# Patient Record
Sex: MALE | Race: WHITE | NOT HISPANIC OR LATINO | URBAN - METROPOLITAN AREA
[De-identification: names, ages, dates, MRNs, and addresses within clinical notes are randomized per-mention and may not be internally consistent; named-entity substitution may affect disease eponyms.]

---

## 2017-01-11 DIAGNOSIS — F41.9 ANXIETY: Primary | ICD-10-CM

## 2017-01-11 RX ORDER — CLONAZEPAM 1 MG/1
1 TABLET ORAL DAILY
Qty: 30 TABLET | Refills: 1
Start: 2017-01-11 | End: 2017-03-08

## 2017-02-17 ENCOUNTER — DOCUMENTATION ONLY (OUTPATIENT)
Dept: VASCULAR SURGERY | Facility: CLINIC | Age: 72
End: 2017-02-17

## 2017-03-08 DIAGNOSIS — F41.9 ANXIETY: ICD-10-CM

## 2017-03-08 DIAGNOSIS — I10 ESSENTIAL HYPERTENSION WITH GOAL BLOOD PRESSURE LESS THAN 140/90: ICD-10-CM

## 2017-03-08 RX ORDER — LISINOPRIL 40 MG/1
40 TABLET ORAL DAILY
Qty: 30 TABLET | Refills: 0 | Status: SHIPPED | OUTPATIENT
Start: 2017-03-08 | End: 2017-04-17

## 2017-03-08 RX ORDER — CLONAZEPAM 1 MG/1
1 TABLET ORAL DAILY
Qty: 30 TABLET | Refills: 0
Start: 2017-03-08 | End: 2017-04-17

## 2017-03-08 NOTE — TELEPHONE ENCOUNTER
Medication is being filled for 1 time refill only due to:  Patient needs to be seen because it has been more than one year since last visit.  Has future appt for 4/10/17

## 2017-03-15 DIAGNOSIS — G47.00 INSOMNIA, UNSPECIFIED TYPE: ICD-10-CM

## 2017-03-15 RX ORDER — ZALEPLON 5 MG/1
15 CAPSULE ORAL AT BEDTIME
Qty: 120 CAPSULE | Refills: 0
Start: 2017-03-15 | End: 2017-05-12

## 2017-03-15 NOTE — TELEPHONE ENCOUNTER
Medication is being filled for 1 time refill only due to:  Patient needs to be seen because it has been more than one year since last visit. Has upcoming appt in early April, sending to MD to diaz for one more month to get to appt.

## 2017-04-10 ENCOUNTER — OFFICE VISIT (OUTPATIENT)
Dept: FAMILY MEDICINE | Facility: CLINIC | Age: 72
End: 2017-04-10

## 2017-04-10 VITALS
DIASTOLIC BLOOD PRESSURE: 88 MMHG | TEMPERATURE: 98 F | HEIGHT: 66 IN | WEIGHT: 177 LBS | HEART RATE: 54 BPM | SYSTOLIC BLOOD PRESSURE: 166 MMHG | BODY MASS INDEX: 28.45 KG/M2

## 2017-04-10 DIAGNOSIS — Z13.1 SCREENING FOR DIABETES MELLITUS: ICD-10-CM

## 2017-04-10 DIAGNOSIS — Z11.59 NEED FOR HEPATITIS C SCREENING TEST: ICD-10-CM

## 2017-04-10 DIAGNOSIS — Z13.220 SCREENING CHOLESTEROL LEVEL: ICD-10-CM

## 2017-04-10 DIAGNOSIS — Z00.00 MEDICARE ANNUAL WELLNESS VISIT, SUBSEQUENT: Primary | ICD-10-CM

## 2017-04-10 DIAGNOSIS — Z12.5 SCREENING FOR PROSTATE CANCER: ICD-10-CM

## 2017-04-10 DIAGNOSIS — I10 ESSENTIAL HYPERTENSION: ICD-10-CM

## 2017-04-10 DIAGNOSIS — R73.09 ELEVATED GLUCOSE LEVEL: ICD-10-CM

## 2017-04-10 LAB
ANION GAP SERPL CALCULATED.3IONS-SCNC: 7 MMOL/L (ref 3–14)
BUN SERPL-MCNC: 19 MG/DL (ref 7–30)
CALCIUM SERPL-MCNC: 9.8 MG/DL (ref 8.5–10.1)
CHLORIDE SERPL-SCNC: 104 MMOL/L (ref 94–109)
CHOLEST SERPL-MCNC: 274 MG/DL
CO2 SERPL-SCNC: 29 MMOL/L (ref 20–32)
CREAT SERPL-MCNC: 1.01 MG/DL (ref 0.66–1.25)
GFR SERPL CREATININE-BSD FRML MDRD: 73 ML/MIN/1.7M2
GLUCOSE SERPL-MCNC: 108 MG/DL (ref 70–99)
HBA1C MFR BLD: 5.8 % (ref 4.1–5.7)
HCV AB SERPL QL IA: NORMAL
HDLC SERPL-MCNC: 60 MG/DL
LDLC SERPL CALC-MCNC: 183 MG/DL
NONHDLC SERPL-MCNC: 214 MG/DL
POTASSIUM SERPL-SCNC: 4.6 MMOL/L (ref 3.4–5.3)
PSA SERPL-ACNC: 2.52 UG/L (ref 0–4)
SODIUM SERPL-SCNC: 140 MMOL/L (ref 133–144)
TRIGL SERPL-MCNC: 156 MG/DL

## 2017-04-10 ASSESSMENT — PAIN SCALES - GENERAL: PAINLEVEL: MILD PAIN (2)

## 2017-04-10 NOTE — NURSING NOTE
"    Chief Complaint   Patient presents with     Physical     72 year old      Blood pressure 148/87, pulse 54, temperature 98  F (36.7  C), temperature source Oral, height 5' 5.98\" (167.6 cm), weight 177 lb (80.3 kg). Body mass index is 28.58 kg/(m^2).    Wt Readings from Last 2 Encounters:   04/10/17 177 lb (80.3 kg)   01/19/16 176 lb 1.9 oz (79.9 kg)     BP Readings from Last 3 Encounters:   04/10/17 148/87   01/19/16 129/79   01/04/16 150/84       Patient Active Problem List   Diagnosis     Insomnia     Preventative health care     Dysthymia     Essential hypertension       Current Outpatient Prescriptions   Medication Sig Dispense Refill     Loratadine (CLARITIN PO) Take by mouth as needed       zaleplon (SONATA) 5 MG capsule Take 3 capsules (15 mg) by mouth At Bedtime May repeat with one capsule ( 5 mg) during night  if needed.  May only fill once every 30 days 120 capsule 0     clonazePAM (KLONOPIN) 1 MG tablet Take 1 tablet (1 mg) by mouth daily as needed.  May fill once every 30 days.  Needs visit for further refills 30 tablet 0     lisinopril (PRINIVIL/ZESTRIL) 40 MG tablet Take 1 tablet (40 mg) by mouth daily Keep upcoming appt for any further refills 30 tablet 0     FLUoxetine (PROZAC) 20 MG capsule Take 1 capsule (20 mg) by mouth daily 90 capsule 4     ezetimibe (ZETIA) 10 MG tablet Take 1 tablet by mouth daily.       aspirin 81 MG tablet Take 1 tablet by mouth daily.       Calcium Malate POWD 500-1,000 mg daily.       Methylsulfonylmethane (MSM) 1000 MG CAPS Take 2,000 mg by mouth daily.          Social History   Substance Use Topics     Smoking status: Never Smoker     Smokeless tobacco: Not on file     Alcohol use Yes         Health Maintenance Due   Topic Date Due     DEPRESSION ACTION PLAN Q1 YR (NO INBASKET)  1945     ADVANCE DIRECTIVE PLANNING Q5 YRS (NO INBASKET)  01/01/1963     HEPATITIS C SCREENING  01/01/1963     FALL RISK ASSESSMENT  01/01/2010     PHQ-9 Q1 MONTH (NO INBASKET)  " 02/19/2016     PHQ-9 Q3 MONTHS (NO INBASKET)  04/19/2016       STEPHANIE RAMOS CMA  April 10, 2017 8:44 AM

## 2017-04-10 NOTE — MR AVS SNAPSHOT
After Visit Summary   4/10/2017    Amari Eddy    MRN: [id number]           Patient Information     Date Of Birth          1945        Visit Information        Provider Department      4/10/2017 8:40 AM José Bo MD [facility]        Today's Diagnoses     Medicare annual wellness visit, subsequent    -  1    Need for hepatitis C screening test        Essential hypertension        Screening cholesterol level        Screening for diabetes mellitus        Screening for prostate cancer        Elevated glucose level          Care Instructions      Preventive Health Recommendations:       Male Ages 65 and over    Yearly exam:             See your health care provider every year in order to  o   Review health changes.   o   Discuss preventive care.    o   Review your medicines if your doctor has prescribed any.    Talk with your health care provider about whether you should have a test to screen for prostate cancer (PSA).    Every 3 years, have a diabetes test (fasting glucose). If you are at risk for diabetes, you should have this test more often.    Every 5 years, have a cholesterol test. Have this test more often if you are at risk for high cholesterol or heart disease.     Every 10 years, have a colonoscopy. Or, have a yearly FIT test (stool test). These exams will check for colon cancer.    Talk to with your health care provider about screening for Abdominal Aortic Aneurysm if you have a family history of AAA or have a history of smoking.  Shots:     Get a flu shot each year.     Get a tetanus shot every 10 years.     Talk to your doctor about your pneumonia vaccines. There are now two you should receive - Pneumovax (PPSV 23) and Prevnar (PCV 13).    Talk to your doctor about a shingles vaccine.     Talk to your doctor about the hepatitis B vaccine.  Nutrition:     Eat at least 5 servings of fruits and vegetables each day.     Eat whole-grain bread, whole-wheat pasta and brown  rice instead of white grains and rice.     Talk to your doctor about Calcium and Vitamin D.   Lifestyle    Exercise for at least 150 minutes a week (30 minutes a day, 5 days a week). This will help you control your weight and prevent disease.     Limit alcohol to one drink per day.     No smoking.     Wear sunscreen to prevent skin cancer.     See your dentist every six months for an exam and cleaning.     See your eye doctor every 1 to 2 years to screen for conditions such as glaucoma, macular degeneration and cataracts.        Follow-ups after your visit        Follow-up notes from your care team     Return if symptoms worsen or fail to improve.      Who to contact     Please call your clinic at [phone] to:    Ask questions about your health    Make or cancel appointments    Discuss your medicines    Learn about your test results    Speak to your doctor   If you have compliments or concerns about an experience at your clinic, or if you wish to file a complaint, please contact [facility] Patient Relations at [phone] or email us at [email or web address]         Additional Information About Your Visit        [employer] Information     [employer] gives you secure access to your electronic health record. If you see a primary care provider, you can also send messages to your care team and make appointments. If you have questions, please call your primary care clinic.  If you do not have a primary care provider, please call [phone] and they will assist you.      [employer] is an electronic gateway that provides easy, online access to your medical records. With [employer], you can request a clinic appointment, read your test results, renew a prescription or communicate with your care team.     To access your existing account, please contact your [facility] or call [phone] for assistance.        Care EveryWhere ID     This is your Care EveryWhere  "ID. This could be used by other organizations to access your [facility] medical records  [id number]        Your Vitals Were     Pulse Temperature Height BMI (Body Mass Index)          54 98  F (36.7  C) (Oral) 5' 5.98\" (167.6 cm) 28.58 kg/m2         Blood Pressure from Last 3 Encounters:   04/10/17 166/88   01/19/16 129/79   01/04/16 150/84    Weight from Last 3 Encounters:   04/10/17 177 lb (80.3 kg)   01/19/16 176 lb 1.9 oz (79.9 kg)   01/04/16 172 lb 11.2 oz (78.3 kg)              We Performed the Following     Basic metabolic panel     Hemoglobin A1c ([place])     Hepatitis C Screen Reflex to HCV RNA Quant and Genotype     Lipid Profile     Prostate spec antigen screen          Today's Medication Changes          These changes are accurate as of: 4/10/17 10:06 AM.  If you have any questions, ask your nurse or doctor.               Stop taking these medicines if you haven't already. Please contact your care team if you have questions.     DHA Omega 3 100 MG Caps   Stopped by:  José Bo MD           vardenafil 20 MG tablet   Commonly known as:  LEVITRA   Stopped by:  José Bo MD                    Primary Care Provider Office Phone # Fax #    José Bo [phone]       [place] [phone]        Thank you!     Thank you for choosing [facility]  for your care. Our goal is always to provide you with excellent care. Hearing back from our patients is one way we can continue to improve our services. Please take a few minutes to complete the written survey that you may receive in the mail after your visit with us. Thank you!             Your Updated Medication List - Protect others around you: Learn how to safely use, store and throw away your medicines at www.disposemymeds.org.          This list is accurate as of: 4/10/17 10:06 AM.  Always use your most recent med list.                   Brand Name Dispense Instructions " for use    aspirin 81 MG tablet      Take 1 tablet by mouth daily.       Calcium Malate Powd      500-1,000 mg daily.       CLARITIN PO      Take by mouth as needed       clonazePAM 1 MG tablet    klonoPIN    30 tablet    Take 1 tablet (1 mg) by mouth daily as needed.  May fill once every 30 days.  Needs visit for further refills       FLUoxetine 20 MG capsule    PROzac    90 capsule    Take 1 capsule (20 mg) by mouth daily       lisinopril 40 MG tablet    PRINIVIL/ZESTRIL    30 tablet    Take 1 tablet (40 mg) by mouth daily Keep upcoming appt for any further refills       methylsulfonylmethane 1000 MG Caps capsule    MSM     Take 2,000 mg by mouth daily.       zaleplon 5 MG capsule    SONATA    120 capsule    Take 3 capsules (15 mg) by mouth At Bedtime May repeat with one capsule ( 5 mg) during night  if needed.  May only fill once every 30 days       ZETIA 10 MG tablet   Generic drug:  ezetimibe      Take 1 tablet by mouth daily.

## 2017-04-12 ASSESSMENT — ANXIETY QUESTIONNAIRES
3. WORRYING TOO MUCH ABOUT DIFFERENT THINGS: NOT AT ALL
1. FEELING NERVOUS, ANXIOUS, OR ON EDGE: NOT AT ALL
5. BEING SO RESTLESS THAT IT IS HARD TO SIT STILL: NOT AT ALL
7. FEELING AFRAID AS IF SOMETHING AWFUL MIGHT HAPPEN: NOT AT ALL
GAD7 TOTAL SCORE: 0
6. BECOMING EASILY ANNOYED OR IRRITABLE: NOT AT ALL
2. NOT BEING ABLE TO STOP OR CONTROL WORRYING: NOT AT ALL

## 2017-04-12 ASSESSMENT — PATIENT HEALTH QUESTIONNAIRE - PHQ9: 5. POOR APPETITE OR OVEREATING: NOT AT ALL

## 2017-04-12 NOTE — PROGRESS NOTES
CHIEF COMPLAINT:  Physical exam.      HISTORY OF PRESENT ILLNESS:  Nakul is a 72-year-old gentleman here for the above.  Overall, he is doing well.  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  That has been good.  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  However, he is not going to the gym as much as he once did, and as a result, his weight is increasing.  He knows how to correct this and plans to do so.      ACTIVE MEDICAL PROBLEMS:  Minimal.  He comes in for routine preventive care.  He has a history of dysthymia and essential hypertension.  He also has some chronic insomnia.  These were all addressed.      CURRENT MEDICATIONS:  Reviewed.        FAMILY, SOCIAL AND PAST SURGICAL HISTORIES:  Reviewed.   His mother  a number of years ago.  His father remarried and he had some dementia toward the end of his life.  Nakul's mother-in-law currently runs a foundation that deals with memory loss issues.      MEDICARE QUESTIONING:  He has no difficulty with any of his activities of daily living.  He has had absolutely no falls at home.  No adaptive devices or use.  No depression.  History of dysthymia.  PHQ-9 score is 4.  Three of those 4 points relate to his insomnia.  His memory is actually better than it had been.  He notices that when he sleeps better his memory improves.   He typically goes to bed around 8 p.m. now, falling asleep around 10:30 after watching a show.  He wakes up feeling quite rested.      HEALTHCARE MAINTENANCE:  He wears glasses and his eyes are checked on a regular basis.  No change with his hearing.  He is up to date with dental care.  Blood pressure 166/88 but upon a recheck it was 148/87.  At his age of 72, that is acceptable.  BMI 28.58.  Weight is 1 pound more than he weighed in 2016 so he is going to work on getting that down.  Immunizations are up to date.   I really do not have anything to offer him there.  We will do one time  hepatitis C antibody screening as he was born in 1945.  We will check a lipid panel and an A1c as well as a basic metabolic panel.  PSA 50.  Colon cancer screening is good until 12/2017.  Ten-point review of systems is negative.      OBJECTIVE:  Nakul is in absolutely no distress.  Affect is upbeat.  He is alert and oriented x3.  /87 with a pulse of 54 and regular.  Temperature is 98.  He is 5 feet 6 inches and weighs 177 with a BMI of 28.58.  PHQ-9 score of 4.  EMMANUELLE-7 score of 0.   HEENT:  Head is normocephalic, atraumatic.  Ears are free of any cerumen.  The TMs look fine.  There is no pain with palpation of the frontal or maxillary sinuses.  Eyes are grossly normal.  Nose is free of congestion or discharge.  No evidence of recent bleeding.  Teeth in good repair.  No dentures.  Mucous membranes are moist.  Throat looks benign.   NECK:  Supple without adenopathy or thyromegaly.  No carotid bruits are heard, no JVD.   BACK:  Smooth and straight.  No pain to percussion.  No CVA tenderness.   LUNGS:  Clear to auscultation bilaterally.   HEART:  Regular rate and rhythm without murmur.   ABDOMEN:  Benign.  Ankles are free of any edema.   SKIN:  Warm and dry.      LABORATORY DATA:  Pending.      ASSESSMENT AND PLAN:   1.  Medicare annual visit, subsequent.   2.  History of dysthymia, PHQ-9 score of just 4.  Continue on the fluoxetine 20 mg daily.   3.  History of essential hypertension, fairly well controlled.  Continue on lisinopril 40 mg daily.   4.  Continue with aspirin 81 mg daily.   5.  Zaleplon (Sonata) at night as needed for his insomnia.   6.  Hepatitis C antibody level pending for one-time screening purposes.   7.  Other labs will be reviewed and sent to him.  He will call with any problems or questions in the meantime.

## 2017-04-13 ASSESSMENT — ANXIETY QUESTIONNAIRES: GAD7 TOTAL SCORE: 0

## 2017-04-13 ASSESSMENT — PATIENT HEALTH QUESTIONNAIRE - PHQ9: SUM OF ALL RESPONSES TO PHQ QUESTIONS 1-9: 4

## 2017-04-17 DIAGNOSIS — F41.9 ANXIETY: ICD-10-CM

## 2017-04-17 DIAGNOSIS — I10 ESSENTIAL HYPERTENSION WITH GOAL BLOOD PRESSURE LESS THAN 140/90: ICD-10-CM

## 2017-04-17 DIAGNOSIS — F34.1 DYSTHYMIA: ICD-10-CM

## 2017-04-17 RX ORDER — LISINOPRIL 40 MG/1
40 TABLET ORAL DAILY
Qty: 90 TABLET | Refills: 3 | Status: SHIPPED | OUTPATIENT
Start: 2017-04-17 | End: 2017-09-12 | Stop reason: ALTCHOICE

## 2017-04-17 RX ORDER — CLONAZEPAM 1 MG/1
1 TABLET ORAL DAILY
Qty: 30 TABLET | Refills: 1
Start: 2017-04-17 | End: 2017-06-16

## 2017-04-17 NOTE — TELEPHONE ENCOUNTER
Prescription approved per [facility] Refill Protocol.  Per MD visit, OK for refills for year.   Clonazepam to MD to auth

## 2017-04-18 PROBLEM — E78.5 HYPERLIPIDEMIA LDL GOAL <160: Status: ACTIVE | Noted: 2017-04-18

## 2017-04-20 DIAGNOSIS — E78.5 HYPERLIPIDEMIA: Primary | ICD-10-CM

## 2017-04-21 ENCOUNTER — PRE VISIT (OUTPATIENT)
Dept: CARDIOLOGY | Facility: CLINIC | Age: 72
End: 2017-04-21

## 2017-04-21 NOTE — TELEPHONE ENCOUNTER
Offered patient sooner appointment as requested.  He is asymptomatic at this time.  He was appreciative and denies further questions or concerns at this time.

## 2017-05-11 ASSESSMENT — ENCOUNTER SYMPTOMS
SNORES LOUDLY: 1
COUGH: 1
SPUTUM PRODUCTION: 1

## 2017-05-12 DIAGNOSIS — G47.00 INSOMNIA, UNSPECIFIED TYPE: ICD-10-CM

## 2017-05-12 RX ORDER — ZALEPLON 5 MG/1
15 CAPSULE ORAL AT BEDTIME
Qty: 120 CAPSULE | Refills: 1
Start: 2017-05-12 | End: 2017-09-08

## 2017-05-25 ENCOUNTER — OFFICE VISIT (OUTPATIENT)
Dept: CARDIOLOGY | Facility: CLINIC | Age: 72
End: 2017-05-25
Attending: INTERNAL MEDICINE
Payer: MEDICARE

## 2017-05-25 VITALS
HEIGHT: 66 IN | BODY MASS INDEX: 29.09 KG/M2 | SYSTOLIC BLOOD PRESSURE: 138 MMHG | WEIGHT: 181 LBS | HEART RATE: 67 BPM | DIASTOLIC BLOOD PRESSURE: 77 MMHG | OXYGEN SATURATION: 93 %

## 2017-05-25 DIAGNOSIS — E78.5 HYPERLIPIDEMIA LDL GOAL <70: Primary | ICD-10-CM

## 2017-05-25 PROCEDURE — 99203 OFFICE O/P NEW LOW 30 MIN: CPT | Mod: GC | Performed by: INTERNAL MEDICINE

## 2017-05-25 RX ORDER — ATORVASTATIN CALCIUM 20 MG/1
20 TABLET, FILM COATED ORAL DAILY
Qty: 90 TABLET | Refills: 3 | Status: SHIPPED | OUTPATIENT
Start: 2017-05-25 | End: 2017-08-31

## 2017-05-25 ASSESSMENT — PAIN SCALES - GENERAL: PAINLEVEL: NO PAIN (0)

## 2017-05-25 NOTE — PATIENT INSTRUCTIONS
Start Atorvastatin 10 mg (one-half tablet) by mouth once daily for 3 weeks. STOP immediately if you develop muscle pain or weakness.  Notify Dr. Arango via [employer] and do not resume therapy until further instructions received.   Start Atorvastatin 20 mg by mouth once daily thereafter. STOP immediately if you develop muscle pain or weakness.  Notify Dr. Arango via [employer] and do not resume therapy until further instructions received.     Repeat fasting labs in 8-10 weeks to assess response to Atorvastatin.      Return to clinic in one year.  Fasting labs will be needed prior to this appointment.     Please do not hesitate to call if you have any cardiology related questions or concerns, or need to schedule an appointment, at [phone].         Cardiology Medication Refill Request Information:  * Please contact your pharmacy regarding ANY request for medication refills.  ** [facility] Prescription Fax = [phone]  * Please allow 3 business days for routine medication refills.    Cardiology Test Result notification information:  *You will be notified with in 7-10 days of your appointment day regarding the results of your test. If you are on MyChart you will be notified as soon as the provider has reviewed the results and signed off on them. Please call RN Care Coordinator with questions regarding results.

## 2017-05-25 NOTE — LETTER
5/25/2017      RE: Amari CERVANTES Surjit  [address] [place] [address]       Dear Colleague,    Thank you for the opportunity to participate in the care of your patient, Amari Eddy, at the [facility]. Please see a copy of my visit note below.    HPI: 72M Hx HTN on ACEi, Insomnia, Overweight (BMI 30), former smoker (90 pack yrs, quit 30 yrs ago), and hyperlipidemia on zetia due to prior statin intolerance, referred to Dr Arango's cardiology clinic for lipid management. Patient has , LDL cholesterol 183, HDL 60, . Estimating about 12-15% LDL reduction wit zetia, he likely has an untreated baseline LDL of ~ 215. 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    In terms of statin history, patient was on Baychol (Cerivastatin) in the late 1990s, but was discontinued due to post marketing concerns for rhabdomyolysis and death (the drug was withdrawn from the market in 2001). He was subsequently placed on atorvastatin (unknown dose) for about 6-12 months, but was discontinued due to possible lower extremity weakness or abnormal gait per patient's recollection.    Otherwise, pt is well, exercise tolerance >4METS, no angina, cp, sob, palps, orthopnea, pnd.    Stress echo 2012 showed normal EF 55-60, no inducible ischemia  No prior angiograms      PAST MEDICAL HISTORY:  No past medical history on file.    CURRENT MEDICATIONS:  Current Outpatient Prescriptions   Medication Sig Dispense Refill     zaleplon (SONATA) 5 MG capsule Take 3 capsules (15 mg) by mouth At Bedtime May repeat with one capsule ( 5 mg) during night  if needed.  May only fill once every 30 days 120 capsule 1     lisinopril (PRINIVIL/ZESTRIL) 40 MG tablet Take 1 tablet (40 mg) by mouth daily 90 tablet 3     FLUoxetine (PROZAC) 20 MG capsule Take 1 capsule (20 mg) by mouth  "daily 90 capsule 3     clonazePAM (KLONOPIN) 1 MG tablet Take 1 tablet (1 mg) by mouth daily as needed.  May fill once every 30 days. 30 tablet 1     Loratadine (CLARITIN PO) Take by mouth as needed       ezetimibe (ZETIA) 10 MG tablet Take 1 tablet by mouth daily.       aspirin 81 MG tablet Take 1 tablet by mouth daily.       Calcium Malate POWD 500-1,000 mg daily.       Methylsulfonylmethane (MSM) 1000 MG CAPS Take 2,000 mg by mouth daily.          PAST SURGICAL HISTORY:  No past surgical history on file.    ALLERGIES   No Known Allergies    FAMILY HISTORY:  Family History   Problem Relation Age of Onset     CANCER Mother      Alzheimer Disease Father    3 brothers  - At least one brother with ASCVD in his 50s(?), and another brother with uncertain cardiac disorder leading to premature mortality    SOCIAL HISTORY:  Social History     Social History     Marital status:      Spouse name: N/A     Number of children: N/A     Years of education: N/A     Social History Main Topics     Smoking status: Former smoker, 3PPD x 30 yrs (90 Pack years), Quit in 1990     Smokeless tobacco: Not on file     Alcohol use Yes     Drug use: No     Sexual activity: Yes     Partners: Female     Other Topics Concern     Not on file     Social History Narrative       ROS:   Constitutional: No fever, chills, or sweats. No weight gain/loss   ENT: No visual disturbance, ear ache, epistaxis, sore throat  Allergies/Immunologic: Negative.   Respiratory: No cough, hemoptysia  Cardiovascular: As per HPI  GI: No nausea, vomiting, hematemesis, melena, or hematochezia  : No urinary frequency, dysuria, or hematuria  Integument: Negative  Psychiatric: Negative  Neuro: Negative  Endocrinology: Negative   Musculoskeletal: Negative    EXAM:  /77 (BP Location: Left arm, Patient Position: Chair, Cuff Size: Adult Regular)  Pulse 67  Ht 1.676 m (5' 6\")  Wt 82.1 kg (181 lb)  SpO2 93%  BMI 29.21 kg/m2  In general, the patient is a pleasant " male in no apparent distress.    HEENT: NC/AT.  PERRLA.  EOMI.  Sclerae white, not injected.  Nares clear.  Pharynx without erythema or exudate.  Dentition intact.    Neck: No adenopathy.  No thyromegaly. Carotids +4/4 bilaterally without bruits.  No jugular venous distension.   Heart: RRR. Normal S1, S2 splits physiologically. No murmur, rub, click, or gallop. The PMI is in the 5th ICS in the midclavicular line. There is no heave.    Lungs: CTA.  No ronchi, wheezes, rales.  No dullness to percussion.   Abdomen: Soft, nontender, nondistended. No organomegaly.  No bruits.   Extremities: No clubbing, cyanosis, or edema.  The pulses are +4/4 at the radial, brachial, femoral, popliteal, DP, and PT sites bilaterally.  No bruits are noted.  Neurologic: Alert and oriented to person/place/time, normal speech, gait and affect  Skin: No petechiae, purpura or rash.    Labs:  LIPID RESULTS:  Lab Results   Component Value Date    CHOL 274 (H) 04/10/2017    HDL 60 04/10/2017     (H) 04/10/2017    TRIG 156 (H) 04/10/2017    CHOLHDLRATIO 3.4 03/03/2015    NHDL 214 (H) 04/10/2017       LIVER ENZYME RESULTS:  Lab Results   Component Value Date    AST 38.0 12/11/2013    ALT 42.0 12/11/2013       CBC RESULTS:  Lab Results   Component Value Date    WBC 5.8 02/17/2010    RBC 5.01 02/17/2010    HGB 15.1 04/07/2011    HCT 47.1 02/17/2010    MCV 94 02/17/2010    MCH 32.3 02/17/2010    MCHC 34.4 02/17/2010    RDW 12.5 02/17/2010     02/17/2010       BMP RESULTS:  Lab Results   Component Value Date     04/10/2017    POTASSIUM 4.6 04/10/2017    CHLORIDE 104 04/10/2017    CO2 29 04/10/2017    ANIONGAP 7 04/10/2017     (H) 04/10/2017    BUN 19 04/10/2017    CR 1.01 04/10/2017    GFRESTIMATED 73 04/10/2017    GFRESTBLACK 88 04/10/2017    DAVE 9.8 04/10/2017        A1C RESULTS:  Lab Results   Component Value Date    A1C 5.8 (H) 04/10/2017         Procedures:      HDL 60      Stress echo 2012 showed  normal EF 55-60, no inducible ischemia    No prior angiograms    Last AST/ALT wnl in 2013    [place] 10 year risk ~ 30% (mostly due to age)    Assessment and Plan:   We discussed the results with patient:  We re-emphasized the importance of a heart healthy lifestyle and diet.    72M Hx HTN on ACEi, Insomnia, Overweight (BMI 30), former smoker (90 pack yrs, quit 30 yrs ago), and hyperlipidemia on zetia due to prior statin intolerance, referred to Dr Arango's cardiology clinic for lipid management.     Estimating about 12-15% LDL reduction with zetia, he likely has an untreated baseline LDL of ~ 215.    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 Ideally pt would tolerate moderate-high intensity statin (ie. atorvastatin 40-80 or rosuvastatin 20-40). Given prior intolerance, will begin with low dose atorvastatin 10mg QHS. After 3 weeks he will check in with our RN and increase to 20mg, then after another 3 weeks to 40mg, if no myopathy or other side effects. We will also repeat a lipid panel at that time. If pt fails atorvastatin, would begin trial of pravastatin.  - continue zetia 10mg    # HTN  - BP at upper limits of acceptable range  - cont lisinopril 40  - counseled on exercise, diet, and weight loss  - further management per PCP      We gave following written instructions to patient:    Start Atorvastatin 10 mg (one-half tablet) by mouth once daily for 3 weeks. STOP immediately if you develop muscle pain or weakness.  Notify Dr. Arango via [employer] and do not resume therapy until further instructions received.   Start Atorvastatin 20 mg by mouth once daily thereafter. STOP immediately if you develop muscle pain or weakness.  Notify Dr. Arango via [employer] and do not resume therapy until further  instructions received.      Repeat fasting labs in 8-10 weeks to assess response to Atorvastatin.       Return to clinic in one year.  Fasting labs will be needed prior to this appointment.   It was a pleasure meeting with Mr Eddy today. Thank you for including us in his care. If further questions arise, please do not hesitate to contact our office.    Shahid Alatorre  Cardiology Fellow  [phone]    I interviewed and examined the patient with the CV fellow  I agree with the assessment and plan as documented.      Sergo Arango MD, PhD  Professor of Medicine  Division of Cardiology    CC  Patient Care Team:  Ryan Bo MD as PCP - General (Family Practice)  Sergo Arango MD as MD (Cardiology)  Rossy Park, RN as Nurse Coordinator (Cardiology)  RYAN BO

## 2017-05-25 NOTE — MR AVS SNAPSHOT
After Visit Summary   5/25/2017    Amari Eddy    MRN: [id number]           Patient Information     Date Of Birth          1945        Visit Information        Provider Department      5/25/2017 4:00 PM Sergo Arango MD [facility]        Today's Diagnoses     Hyperlipidemia LDL goal <70    -  1      Care Instructions    Start Atorvastatin 10 mg (one-half tablet) by mouth once daily for 3 weeks. STOP immediately if you develop muscle pain or weakness.  Notify Dr. Arango via [employer] and do not resume therapy until further instructions received.   Start Atorvastatin 20 mg by mouth once daily thereafter. STOP immediately if you develop muscle pain or weakness.  Notify Dr. Arango via [employer] and do not resume therapy until further instructions received.     Repeat fasting labs in 8-10 weeks to assess response to Atorvastatin.      Return to clinic in one year.  Fasting labs will be needed prior to this appointment.     Please do not hesitate to call if you have any cardiology related questions or concerns, or need to schedule an appointment, at [phone].         Cardiology Medication Refill Request Information:  * Please contact your pharmacy regarding ANY request for medication refills.  ** [facility] Prescription Fax = [phone]  * Please allow 3 business days for routine medication refills.    Cardiology Test Result notification information:  *You will be notified with in 7-10 days of your appointment day regarding the results of your test. If you are on MyChart you will be notified as soon as the provider has reviewed the results and signed off on them. Please call RN Care Coordinator with questions regarding results.                Follow-ups after your visit        Follow-up notes from your care team     Discussed this visit Return in about 1 year (around 5/25/2018) for Nba, Hyperlipidemia, Routine Visit, FASTING lab work.      Future tests that were ordered for you today      "Open Future Orders        Priority Expected Expires Ordered    Comprehensive metabolic panel Routine 8/16/2017 9/25/2018 5/25/2017    Lipid panel reflex to direct LDL Routine 8/16/2017 9/25/2018 5/25/2017    CK total Routine 8/16/2017 9/25/2018 5/25/2017            Who to contact     If you have questions or need follow up information about today's clinic visit or your schedule please contact [facility] directly at [phone].  Normal or non-critical lab and imaging results will be communicated to you by [employer], letter or phone within 4 business days after the clinic has received the results. If you do not hear from us within 7 days, please contact the clinic through [employer] or phone. If you have a critical or abnormal lab result, we will notify you by phone as soon as possible.  Submit refill requests through [employer] or call your pharmacy and they will forward the refill request to us. Please allow 3 business days for your refill to be completed.          Additional Information About Your Visit        [employer] Information     [employer] gives you secure access to your electronic health record. If you see a primary care provider, you can also send messages to your care team and make appointments. If you have questions, please call your primary care clinic.  If you do not have a primary care provider, please call [phone] and they will assist you.        Care EveryWhere ID     This is your Care EveryWhere ID. This could be used by other organizations to access your [place] medical records  [id number]        Your Vitals Were     Pulse Height Pulse Oximetry BMI (Body Mass Index)          67 1.676 m (5' 6\") 93% 29.21 kg/m2         Blood Pressure from Last 3 Encounters:   05/25/17 138/77   04/10/17 166/88   01/19/16 129/79    Weight from Last 3 Encounters:   05/25/17 82.1 kg (181 lb)   04/10/17 80.3 kg (177 lb)   01/19/16 79.9 kg (176 lb 1.9 oz)                 Today's Medication Changes          These " changes are accurate as of: 5/25/17  5:36 PM.  If you have any questions, ask your nurse or doctor.               Start taking these medicines.        Dose/Directions    atorvastatin 20 MG tablet   Commonly known as:  LIPITOR   Used for:  Hyperlipidemia LDL goal <70        Dose:  20 mg   Take 1 tablet (20 mg) by mouth daily , or as directed by Dr. Arango.   Quantity:  90 tablet   Refills:  3            Where to get your medicines      These medications were sent to [facility] #[id number] - [place], [place] - [address]  [address] [place] [address]     Phone:  [phone]     atorvastatin 20 MG tablet                Primary Care Provider Office Phone # Fax #    José Bo [phone]       [place] [address] [facility] [address]        Thank you!     Thank you for choosing [facility]  for your care. Our goal is always to provide you with excellent care. Hearing back from our patients is one way we can continue to improve our services. Please take a few minutes to complete the written survey that you may receive in the mail after your visit with us. Thank you!             Your Updated Medication List - Protect others around you: Learn how to safely use, store and throw away your medicines at www.disposemymeds.org.          This list is accurate as of: 5/25/17  5:36 PM.  Always use your most recent med list.                   Brand Name Dispense Instructions for use    aspirin 81 MG tablet      Take 1 tablet by mouth daily.       atorvastatin 20 MG tablet    LIPITOR    90 tablet    Take 1 tablet (20 mg) by mouth daily , or as directed by Dr. Arango.       Calcium Malate Powd      500-1,000 mg daily.       CLARITIN PO      Take by mouth as needed       clonazePAM 1 MG tablet    klonoPIN    30 tablet    Take 1 tablet (1 mg) by mouth daily as needed.  May fill once every 30 days.       FLUoxetine 20 MG capsule    PROzac    90 capsule    Take 1  capsule (20 mg) by mouth daily       lisinopril 40 MG tablet    PRINIVIL/ZESTRIL    90 tablet    Take 1 tablet (40 mg) by mouth daily       methylsulfonylmethane 1000 MG Caps capsule    MSM     Take 2,000 mg by mouth daily.       zaleplon 5 MG capsule    SONATA    120 capsule    Take 3 capsules (15 mg) by mouth At Bedtime May repeat with one capsule ( 5 mg) during night  if needed.  May only fill once every 30 days       ZETIA 10 MG tablet   Generic drug:  ezetimibe      Take 1 tablet by mouth daily.

## 2017-05-25 NOTE — PROGRESS NOTES
HPI: 72M Hx HTN on ACEi, Insomnia, Overweight (BMI 30), former smoker (90 pack yrs, quit 30 yrs ago), and hyperlipidemia on zetia due to prior statin intolerance, referred to Dr Arango's cardiology clinic for lipid management. Patient has , LDL cholesterol 183, HDL 60, . Estimating about 12-15% LDL reduction wit zetia, he likely has an untreated baseline LDL of ~ 215. 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    In terms of statin history, patient was on Baychol (Cerivastatin) in the late 1990s, but was discontinued due to post marketing concerns for rhabdomyolysis and death (the drug was withdrawn from the market in 2001). He was subsequently placed on atorvastatin (unknown dose) for about 6-12 months, but was discontinued due to possible lower extremity weakness or abnormal gait per patient's recollection.    Otherwise, pt is well, exercise tolerance >4METS, no angina, cp, sob, palps, orthopnea, pnd.    Stress echo 2012 showed normal EF 55-60, no inducible ischemia  No prior angiograms      PAST MEDICAL HISTORY:  No past medical history on file.    CURRENT MEDICATIONS:  Current Outpatient Prescriptions   Medication Sig Dispense Refill     zaleplon (SONATA) 5 MG capsule Take 3 capsules (15 mg) by mouth At Bedtime May repeat with one capsule ( 5 mg) during night  if needed.  May only fill once every 30 days 120 capsule 1     lisinopril (PRINIVIL/ZESTRIL) 40 MG tablet Take 1 tablet (40 mg) by mouth daily 90 tablet 3     FLUoxetine (PROZAC) 20 MG capsule Take 1 capsule (20 mg) by mouth daily 90 capsule 3     clonazePAM (KLONOPIN) 1 MG tablet Take 1 tablet (1 mg) by mouth daily as needed.  May fill once every 30 days. 30 tablet 1     Loratadine (CLARITIN PO) Take by mouth as needed       ezetimibe (ZETIA) 10 MG tablet Take 1 tablet by mouth daily.       aspirin 81 MG tablet Take 1 tablet by mouth daily.    "    Calcium Malate POWD 500-1,000 mg daily.       Methylsulfonylmethane (MSM) 1000 MG CAPS Take 2,000 mg by mouth daily.          PAST SURGICAL HISTORY:  No past surgical history on file.    ALLERGIES   No Known Allergies    FAMILY HISTORY:  Family History   Problem Relation Age of Onset     CANCER Mother      Alzheimer Disease Father    3 brothers  - At least one brother with ASCVD in his 50s(?), and another brother with uncertain cardiac disorder leading to premature mortality    SOCIAL HISTORY:  Social History     Social History     Marital status:      Spouse name: N/A     Number of children: N/A     Years of education: N/A     Social History Main Topics     Smoking status: Former smoker, 3PPD x 30 yrs (90 Pack years), Quit in 1990     Smokeless tobacco: Not on file     Alcohol use Yes     Drug use: No     Sexual activity: Yes     Partners: Female     Other Topics Concern     Not on file     Social History Narrative       ROS:   Constitutional: No fever, chills, or sweats. No weight gain/loss   ENT: No visual disturbance, ear ache, epistaxis, sore throat  Allergies/Immunologic: Negative.   Respiratory: No cough, hemoptysia  Cardiovascular: As per HPI  GI: No nausea, vomiting, hematemesis, melena, or hematochezia  : No urinary frequency, dysuria, or hematuria  Integument: Negative  Psychiatric: Negative  Neuro: Negative  Endocrinology: Negative   Musculoskeletal: Negative    EXAM:  /77 (BP Location: Left arm, Patient Position: Chair, Cuff Size: Adult Regular)  Pulse 67  Ht 1.676 m (5' 6\")  Wt 82.1 kg (181 lb)  SpO2 93%  BMI 29.21 kg/m2  In general, the patient is a pleasant male in no apparent distress.    HEENT: NC/AT.  PERRLA.  EOMI.  Sclerae white, not injected.  Nares clear.  Pharynx without erythema or exudate.  Dentition intact.    Neck: No adenopathy.  No thyromegaly. Carotids +4/4 bilaterally without bruits.  No jugular venous distension.   Heart: RRR. Normal S1, S2 splits " physiologically. No murmur, rub, click, or gallop. The PMI is in the 5th ICS in the midclavicular line. There is no heave.    Lungs: CTA.  No ronchi, wheezes, rales.  No dullness to percussion.   Abdomen: Soft, nontender, nondistended. No organomegaly.  No bruits.   Extremities: No clubbing, cyanosis, or edema.  The pulses are +4/4 at the radial, brachial, femoral, popliteal, DP, and PT sites bilaterally.  No bruits are noted.  Neurologic: Alert and oriented to person/place/time, normal speech, gait and affect  Skin: No petechiae, purpura or rash.    Labs:  LIPID RESULTS:  Lab Results   Component Value Date    CHOL 274 (H) 04/10/2017    HDL 60 04/10/2017     (H) 04/10/2017    TRIG 156 (H) 04/10/2017    CHOLHDLRATIO 3.4 03/03/2015    NHDL 214 (H) 04/10/2017       LIVER ENZYME RESULTS:  Lab Results   Component Value Date    AST 38.0 12/11/2013    ALT 42.0 12/11/2013       CBC RESULTS:  Lab Results   Component Value Date    WBC 5.8 02/17/2010    RBC 5.01 02/17/2010    HGB 15.1 04/07/2011    HCT 47.1 02/17/2010    MCV 94 02/17/2010    MCH 32.3 02/17/2010    MCHC 34.4 02/17/2010    RDW 12.5 02/17/2010     02/17/2010       BMP RESULTS:  Lab Results   Component Value Date     04/10/2017    POTASSIUM 4.6 04/10/2017    CHLORIDE 104 04/10/2017    CO2 29 04/10/2017    ANIONGAP 7 04/10/2017     (H) 04/10/2017    BUN 19 04/10/2017    CR 1.01 04/10/2017    GFRESTIMATED 73 04/10/2017    GFRESTBLACK 88 04/10/2017    DAVE 9.8 04/10/2017        A1C RESULTS:  Lab Results   Component Value Date    A1C 5.8 (H) 04/10/2017         Procedures:      HDL 60      Stress echo 2012 showed normal EF 55-60, no inducible ischemia    No prior angiograms    Last AST/ALT wnl in 2013    [place] 10 year risk ~ 30% (mostly due to age)    Assessment and Plan:   We discussed the results with patient:  We re-emphasized the importance of a heart healthy lifestyle and diet.    72M Hx HTN on ACEi, Insomnia,  Overweight (BMI 30), former smoker (90 pack yrs, quit 30 yrs ago), and hyperlipidemia on zetia due to prior statin intolerance, referred to Dr Arango's cardiology clinic for lipid management.     Estimating about 12-15% LDL reduction with zetia, he likely has an untreated baseline LDL of ~ 215.    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 Ideally pt would tolerate moderate-high intensity statin (ie. atorvastatin 40-80 or rosuvastatin 20-40). Given prior intolerance, will begin with low dose atorvastatin 10mg QHS. After 3 weeks he will check in with our RN and increase to 20mg, then after another 3 weeks to 40mg, if no myopathy or other side effects. We will also repeat a lipid panel at that time. If pt fails atorvastatin, would begin trial of pravastatin.  - continue zetia 10mg    # HTN  - BP at upper limits of acceptable range  - cont lisinopril 40  - counseled on exercise, diet, and weight loss  - further management per PCP      We gave following written instructions to patient:    Start Atorvastatin 10 mg (one-half tablet) by mouth once daily for 3 weeks. STOP immediately if you develop muscle pain or weakness.  Notify Dr. Arango via [employer] and do not resume therapy until further instructions received.   Start Atorvastatin 20 mg by mouth once daily thereafter. STOP immediately if you develop muscle pain or weakness.  Notify Dr. Arango via [employer] and do not resume therapy until further instructions received.      Repeat fasting labs in 8-10 weeks to assess response to Atorvastatin.       Return to clinic in one year.  Fasting labs will be needed prior to this appointment.   It was a pleasure meeting with Mr Eddy today. Thank you for including us in his care. If further questions arise, please do  not hesitate to contact our office.    Shahid Contrerasman  Cardiology Fellow  [phone]    I interviewed and examined the patient with the CV fellow  I agree with the assessment and plan as documented.      Sergo Arango MD, PhD  Professor of Medicine  Division of Cardiology    CC  Patient Care Team:  Ryan Bo MD as PCP - General (Family Practice)  Sergo Arango MD as MD (Cardiology)  Rossy Park, RN as Nurse Coordinator (Cardiology)  RYAN BO

## 2017-05-25 NOTE — NURSING NOTE
Chief Complaint   Patient presents with     New Patient     Initial consult for hyperlipidemia

## 2017-06-16 DIAGNOSIS — F41.9 ANXIETY: ICD-10-CM

## 2017-06-16 RX ORDER — CLONAZEPAM 1 MG/1
1 TABLET ORAL DAILY
Qty: 30 TABLET | Refills: 1
Start: 2017-06-16 | End: 2017-08-16

## 2017-06-19 ENCOUNTER — TELEPHONE (OUTPATIENT)
Dept: CARDIOLOGY | Facility: CLINIC | Age: 72
End: 2017-06-19

## 2017-06-19 NOTE — TELEPHONE ENCOUNTER
Patient called stating he has muscle stiffness in his neck.  He said it is very mild and thinks he may have slept wrong.  He also stated it comes and goes. I spoke with Jorden Ramirez RN who said if the patient would like to stop the statin for a week to see if the pain goes away he can, otherwise continue on medication as tolerable.  Patient said he is going to continue talking the medication and he will call back in if the pain becomes any worse.

## 2017-08-16 DIAGNOSIS — F41.9 ANXIETY: ICD-10-CM

## 2017-08-16 RX ORDER — CLONAZEPAM 1 MG/1
1 TABLET ORAL DAILY
Qty: 30 TABLET | Refills: 1
Start: 2017-08-16 | End: 2017-10-19

## 2017-08-16 NOTE — TELEPHONE ENCOUNTER
Last filled one month ago,  On time for refills  Last saw you in April 2017     Rx called in to pt's pharmacy.

## 2017-08-18 DIAGNOSIS — E78.5 HYPERLIPIDEMIA LDL GOAL <70: ICD-10-CM

## 2017-08-18 LAB
ALBUMIN SERPL-MCNC: 3.6 G/DL (ref 3.4–5)
ALP SERPL-CCNC: 60 U/L (ref 40–150)
ALT SERPL W P-5'-P-CCNC: 43 U/L (ref 0–70)
ANION GAP SERPL CALCULATED.3IONS-SCNC: 6 MMOL/L (ref 3–14)
AST SERPL W P-5'-P-CCNC: 28 U/L (ref 0–45)
BILIRUB SERPL-MCNC: 0.4 MG/DL (ref 0.2–1.3)
BUN SERPL-MCNC: 19 MG/DL (ref 7–30)
CALCIUM SERPL-MCNC: 9.6 MG/DL (ref 8.5–10.1)
CHLORIDE SERPL-SCNC: 103 MMOL/L (ref 94–109)
CHOLEST SERPL-MCNC: 207 MG/DL
CK SERPL-CCNC: 244 U/L (ref 30–300)
CO2 SERPL-SCNC: 29 MMOL/L (ref 20–32)
CREAT SERPL-MCNC: 1.15 MG/DL (ref 0.66–1.25)
GFR SERPL CREATININE-BSD FRML MDRD: 62 ML/MIN/1.7M2
GLUCOSE SERPL-MCNC: 104 MG/DL (ref 70–99)
HDLC SERPL-MCNC: 83 MG/DL
LDLC SERPL CALC-MCNC: 101 MG/DL
NONHDLC SERPL-MCNC: 124 MG/DL
POTASSIUM SERPL-SCNC: 5.1 MMOL/L (ref 3.4–5.3)
PROT SERPL-MCNC: 7.3 G/DL (ref 6.8–8.8)
SODIUM SERPL-SCNC: 138 MMOL/L (ref 133–144)
TRIGL SERPL-MCNC: 111 MG/DL

## 2017-08-31 DIAGNOSIS — E78.5 HYPERLIPIDEMIA LDL GOAL <70: ICD-10-CM

## 2017-08-31 RX ORDER — ATORVASTATIN CALCIUM 20 MG/1
20 TABLET, FILM COATED ORAL DAILY
Qty: 60 TABLET | Refills: 11 | Status: SHIPPED | OUTPATIENT
Start: 2017-08-31 | End: 2017-11-22

## 2017-09-08 DIAGNOSIS — G47.00 INSOMNIA, UNSPECIFIED TYPE: ICD-10-CM

## 2017-09-08 RX ORDER — ZALEPLON 5 MG/1
15 CAPSULE ORAL AT BEDTIME
Qty: 120 CAPSULE | Refills: 1
Start: 2017-09-08 | End: 2018-01-15

## 2017-09-12 ENCOUNTER — TELEPHONE (OUTPATIENT)
Dept: FAMILY MEDICINE | Facility: CLINIC | Age: 72
End: 2017-09-12

## 2017-09-12 DIAGNOSIS — I10 ESSENTIAL HYPERTENSION WITH GOAL BLOOD PRESSURE LESS THAN 140/90: Primary | ICD-10-CM

## 2017-09-12 RX ORDER — LOSARTAN POTASSIUM 100 MG/1
100 TABLET ORAL DAILY
Qty: 90 TABLET | Refills: 4 | Status: SHIPPED | OUTPATIENT
Start: 2017-09-12 | End: 2018-09-12

## 2017-09-12 NOTE — TELEPHONE ENCOUNTER
----- Message from José Bo MD sent at 9/11/2017  2:47 PM CDT -----  Regarding: FW: cough  We can switch him from lisinopril 40 to losartan 100 mg daily.  #90, 4 RF's.    Thanks!    José  ----- Message -----     From: Evelyn Mendez RN     Sent: 9/11/2017   2:22 PM       To: José Bo MD  Subject: cough                                            Nakul has had dry cough issue for some time - states he recently heard that lisinopril can cause this - he is on 40 mg lisinopril, and would like an alternative, as he is quite certain his cough is related to the lisinopril.

## 2017-09-12 NOTE — TELEPHONE ENCOUNTER
New script for losartan e-prescribed to pharmacy. Sent message via [employer] to pt about switch to losartan.  He is let clinic know if there are questions.

## 2017-09-18 ENCOUNTER — TELEPHONE (OUTPATIENT)
Dept: FAMILY MEDICINE | Facility: CLINIC | Age: 72
End: 2017-09-18

## 2017-09-18 NOTE — TELEPHONE ENCOUNTER
PA Initiation    Medication: zaleplon (SONATA) 5 MG capsule -Initiated-  Insurance Company: [employer] - Phone [phone] Fax [phone]  Pharmacy Filling the Rx: [id number] - [place], [place] - [address]  Filling Pharmacy Phone: [phone]  Filling Pharmacy Fax:    Start Date: 9/18/2017

## 2017-09-20 NOTE — TELEPHONE ENCOUNTER
SANDHYA Alonzo has been approved, pharmacy and patient have been notified.  If done with encounter, please close.     Thanks,   Gertrudis (Routing comment)     Noted, closing encounter.      Laila Mccauley RN  September 20, 2017 2:07 PM

## 2017-09-20 NOTE — TELEPHONE ENCOUNTER
Prior Authorization Approval    Authorization Effective Date: 6/21/2017  Authorization Expiration Date: 9/19/2018  Medication: zaleplon (SONATA) 5 MG capsule -APPROVED  Approved Dose/Quantity:   Reference #:     Insurance Company: [employer] - Phone [phone] Fax [phone]  Expected CoPay: $61.52     CoPay Card Available:      Foundation Assistance Needed:    Which Pharmacy is filling the prescription (Not needed for infusion/clinic administered): [id number] - [place], [place] - [address]  Pharmacy Notified: Yes  Patient Notified: Yes

## 2017-10-19 DIAGNOSIS — F41.9 ANXIETY: ICD-10-CM

## 2017-10-19 RX ORDER — CLONAZEPAM 1 MG/1
1 TABLET ORAL DAILY
Qty: 30 TABLET | Refills: 1 | Status: SHIPPED | OUTPATIENT
Start: 2017-10-19 | End: 2017-12-14

## 2017-10-30 ENCOUNTER — ALLIED HEALTH/NURSE VISIT (OUTPATIENT)
Dept: FAMILY MEDICINE | Facility: CLINIC | Age: 72
End: 2017-10-30

## 2017-10-30 DIAGNOSIS — Z23 NEEDS FLU SHOT: Primary | ICD-10-CM

## 2017-10-30 DIAGNOSIS — E78.5 HYPERLIPIDEMIA LDL GOAL <70: ICD-10-CM

## 2017-10-30 LAB
ALBUMIN SERPL-MCNC: 3.6 G/DL (ref 3.4–5)
ALP SERPL-CCNC: 74 U/L (ref 40–150)
ALT SERPL W P-5'-P-CCNC: 43 U/L (ref 0–70)
ANION GAP SERPL CALCULATED.3IONS-SCNC: 8 MMOL/L (ref 3–14)
AST SERPL W P-5'-P-CCNC: 26 U/L (ref 0–45)
BILIRUB SERPL-MCNC: 0.5 MG/DL (ref 0.2–1.3)
BUN SERPL-MCNC: 24 MG/DL (ref 7–30)
CALCIUM SERPL-MCNC: 9.4 MG/DL (ref 8.5–10.1)
CHLORIDE SERPL-SCNC: 102 MMOL/L (ref 94–109)
CHOLEST SERPL-MCNC: 220 MG/DL
CK SERPL-CCNC: 111 U/L (ref 30–300)
CO2 SERPL-SCNC: 28 MMOL/L (ref 20–32)
CREAT SERPL-MCNC: 1.19 MG/DL (ref 0.66–1.25)
GFR SERPL CREATININE-BSD FRML MDRD: 60 ML/MIN/1.7M2
GLUCOSE SERPL-MCNC: 102 MG/DL (ref 70–99)
HDLC SERPL-MCNC: 87 MG/DL
LDLC SERPL CALC-MCNC: 104 MG/DL
NONHDLC SERPL-MCNC: 134 MG/DL
POTASSIUM SERPL-SCNC: 4.6 MMOL/L (ref 3.4–5.3)
PROT SERPL-MCNC: 7.6 G/DL (ref 6.8–8.8)
SODIUM SERPL-SCNC: 138 MMOL/L (ref 133–144)
TRIGL SERPL-MCNC: 149 MG/DL

## 2017-10-30 NOTE — MR AVS SNAPSHOT
After Visit Summary   10/30/2017    Amari Eddy    MRN: [id number]           Patient Information     Date Of Birth          1945        Visit Information        Provider Department      10/30/2017 8:30 AM NurseYing [facility]        Today's Diagnoses     Needs flu shot    -  1       Follow-ups after your visit        Who to contact     Please call your clinic at [phone] to:    Ask questions about your health    Make or cancel appointments    Discuss your medicines    Learn about your test results    Speak to your doctor   If you have compliments or concerns about an experience at your clinic, or if you wish to file a complaint, please contact [facility] Patient Relations at [phone] or email us at [email or web address]         Additional Information About Your Visit        MyChart Information     [employer] gives you secure access to your electronic health record. If you see a primary care provider, you can also send messages to your care team and make appointments. If you have questions, please call your primary care clinic.  If you do not have a primary care provider, please call [phone] and they will assist you.      [employer] is an electronic gateway that provides easy, online access to your medical records. With [employer], you can request a clinic appointment, read your test results, renew a prescription or communicate with your care team.     To access your existing account, please contact your [facility] or call [phone] for assistance.        Care EveryWhere ID     This is your Care EveryWhere ID. This could be used by other organizations to access your [place] medical records  [id number]         Blood Pressure from Last 3 Encounters:   05/25/17 138/77   04/10/17 166/88   01/19/16 129/79    Weight from Last 3 Encounters:   05/25/17 181 lb (82.1 kg)   04/10/17 177 lb (80.3 kg)   01/19/16 176 lb  1.9 oz (79.9 kg)              We Performed the Following     ADMIN INFLUENZA VIRUS VACCINE     HC FLU VACCINE, INCREASED ANTIGEN, PRESV FREE        Primary Care Provider Office Phone # Fax #    José Bo [phone]       [address] [place] [address]        Equal Access to Services     SAMARA ARANDA : Hadii aad ku hadasho Soomaali, waaxda luqadaha, qaybta kaalmada adeegyada, waxay mariolain hayreneolvin langsujatasam flynn. So [facility] [phone].    ATENCIÓN: Si habla español, tiene a martínez disposición servicios gratuitos de asistencia lingüística. [facility] [phone].    We comply with applicable federal civil rights laws and [place] laws. We do not discriminate on the basis of race, color, national origin, age, disability, sex, sexual orientation, or gender identity.            Thank you!     Thank you for choosing [facility]  for your care. Our goal is always to provide you with excellent care. Hearing back from our patients is one way we can continue to improve our services. Please take a few minutes to complete the written survey that you may receive in the mail after your visit with us. Thank you!             Your Updated Medication List - Protect others around you: Learn how to safely use, store and throw away your medicines at www.disposemymeds.org.          This list is accurate as of: 10/30/17  8:42 AM.  Always use your most recent med list.                   Brand Name Dispense Instructions for use Diagnosis    aspirin 81 MG tablet      Take 1 tablet by mouth daily.        atorvastatin 20 MG tablet    LIPITOR    60 tablet    Take 1 tablet (20 mg) by mouth daily Alternate between 40 mg and 20 mg every other day for 3 weeks, then 40 mg daily.    Hyperlipidemia LDL goal <70       Calcium Malate Powd      500-1,000 mg daily.        CLARITIN PO      Take by mouth as needed        clonazePAM 1 MG tablet    klonoPIN    30 tablet    Take 1 tablet (1 mg) by mouth daily as  needed.  May fill once every 30 days.    Anxiety       FLUoxetine 20 MG capsule    PROzac    90 capsule    Take 1 capsule (20 mg) by mouth daily    Dysthymia       losartan 100 MG tablet    COZAAR    90 tablet    Take 1 tablet (100 mg) by mouth daily    Essential hypertension with goal blood pressure less than 140/90       methylsulfonylmethane 1000 MG Caps capsule    MSM     Take 2,000 mg by mouth daily.        zaleplon 5 MG capsule    SONATA    120 capsule    Take 3 capsules (15 mg) by mouth At Bedtime May repeat with one capsule ( 5 mg) during night  if needed.  May only fill once every 30 days    Insomnia, unspecified type       ZETIA 10 MG tablet   Generic drug:  ezetimibe      Take 1 tablet by mouth daily.

## 2017-10-30 NOTE — NURSING NOTE
"Amari Eddy comes into clinic today at the request of Dr. Bo Ordering Provider for Flu shot.        This service provided today was under the supervising provider of the day Dr. Bo, who was available if needed.        Yareli Enriquez    Injectable Influenza Immunization Documentation    1.  Has the patient received the information for the injectable influenza vaccine? YES     2. Is the patient 6 months of age or older? YES     3. Does the patient have any of the following contraindications?         Severe allergy to eggs? No     Severe allergic reaction to previous influenza vaccines? No   Severe allergy to latex? No       History of Guillain-Mcleod syndrome? No     Currently have a temperature greater than 100.4F? No        4.  Severely egg allergic patients should have flu vaccine eligibility assessed by an MD, RN, or pharmacist, and those who received flu vaccine should be observed for 15 min by an MD, RN, Pharmacist, Medical Technician, or member of clinic staff.\": YES    5. Latex-allergic patients should be given latex-free influenza vaccine Yes. Please reference the Vaccine latex table to determine if your clinic s product is latex-containing.       Vaccination given by Yareli Enriquez CMA  October 30, 2017 8:41 AM                "

## 2017-11-07 ENCOUNTER — MYC MEDICAL ADVICE (OUTPATIENT)
Dept: CARDIOLOGY | Facility: CLINIC | Age: 72
End: 2017-11-07

## 2017-11-07 DIAGNOSIS — E78.5 HYPERLIPIDEMIA LDL GOAL <70: Primary | ICD-10-CM

## 2017-11-22 RX ORDER — ATORVASTATIN CALCIUM 40 MG/1
TABLET, FILM COATED ORAL
Qty: 135 TABLET | Refills: 3 | Status: SHIPPED | OUTPATIENT
Start: 2017-11-22 | End: 2018-12-15

## 2017-11-22 NOTE — TELEPHONE ENCOUNTER
Date: 11/22/2017    Time of Call: 12:24 PM     Diagnosis:  Hyperlipidemia     [ TORB ] Ordering provider: Dr. Arango  Order: Increase atorvastatin to 40 mg once every other day alternating with 60 mg once every other day for 3 weeks. Stop immediatly if develop new or changed muscle pain.  After 3 weeks, increase to 60 mg once daily thereafter. Repeat CMP, lipids and CK in 3 months.     Order received by: Rossy Park RN, BSN     Follow-up/additional notes: [employer] message sent to patient.

## 2017-12-14 DIAGNOSIS — F41.9 ANXIETY: ICD-10-CM

## 2017-12-14 RX ORDER — CLONAZEPAM 1 MG/1
1 TABLET ORAL DAILY
Qty: 30 TABLET | Refills: 1 | Status: SHIPPED | OUTPATIENT
Start: 2017-12-18 | End: 2018-02-12

## 2017-12-14 NOTE — TELEPHONE ENCOUNTER
"I called this prescription for Clonazepam into [facility] in [place].  Approved per Dr. Bo.  Meño RAVI  [facility]  December 14, 2017 2:03 PM    "

## 2018-01-15 DIAGNOSIS — G47.00 INSOMNIA, UNSPECIFIED TYPE: ICD-10-CM

## 2018-01-15 RX ORDER — ZALEPLON 5 MG/1
15 CAPSULE ORAL AT BEDTIME
Qty: 120 CAPSULE | Refills: 1
Start: 2018-01-15 | End: 2018-05-14

## 2018-01-15 NOTE — TELEPHONE ENCOUNTER
Last filled on 11/16/17 ,  Last seen 4/10/17.      120 tabs seems to be lasting 2 months for pt.     To MD to auth

## 2018-02-12 DIAGNOSIS — F41.9 ANXIETY: ICD-10-CM

## 2018-02-12 RX ORDER — CLONAZEPAM 1 MG/1
1 TABLET ORAL DAILY
Qty: 30 TABLET | Refills: 1
Start: 2018-02-13 | End: 2018-04-16

## 2018-02-12 NOTE — TELEPHONE ENCOUNTER
Last filled 12/14/17 and 1/15/18 for # 30,  Will set up for refill tomorrow, future fill date, for 30 day rule.    To MD to diaz Mccauley RN  February 12, 2018 9:48 AM

## 2018-02-12 NOTE — TELEPHONE ENCOUNTER
Rx called in to pt's pharmacy.  Future fill date noted.    Laila Mccauley RN  February 12, 2018 10:31 AM

## 2018-04-16 DIAGNOSIS — F41.9 ANXIETY: ICD-10-CM

## 2018-04-16 DIAGNOSIS — F34.1 DYSTHYMIA: ICD-10-CM

## 2018-04-16 NOTE — TELEPHONE ENCOUNTER
Last office visit: 04/10/2017 no future appointment.  Needs follow-up.    Medication is being filled for 1 time refill only due to:  Patient needs to be seen because it has been more than one year since last visit. Needs PHQ9

## 2018-04-17 RX ORDER — CLONAZEPAM 1 MG/1
1 TABLET ORAL DAILY
Qty: 30 TABLET | Refills: 0 | Status: SHIPPED | OUTPATIENT
Start: 2018-04-17 | End: 2018-05-15

## 2018-04-17 NOTE — TELEPHONE ENCOUNTER
I called this prescription for the Clonazepam into the [facility].  Approved per Dr. Bo.  Meño RAVI  [facility]  April 17, 2018 4:43 PM

## 2018-04-25 DIAGNOSIS — E78.5 HYPERLIPIDEMIA LDL GOAL <70: ICD-10-CM

## 2018-04-25 LAB
ALBUMIN SERPL-MCNC: 3.6 G/DL (ref 3.4–5)
ALP SERPL-CCNC: 61 U/L (ref 40–150)
ALT SERPL W P-5'-P-CCNC: 37 U/L (ref 0–70)
ANION GAP SERPL CALCULATED.3IONS-SCNC: 7 MMOL/L (ref 3–14)
AST SERPL W P-5'-P-CCNC: 28 U/L (ref 0–45)
BILIRUB SERPL-MCNC: 0.5 MG/DL (ref 0.2–1.3)
BUN SERPL-MCNC: 16 MG/DL (ref 7–30)
CALCIUM SERPL-MCNC: 9 MG/DL (ref 8.5–10.1)
CHLORIDE SERPL-SCNC: 106 MMOL/L (ref 94–109)
CHOLEST SERPL-MCNC: 169 MG/DL
CK SERPL-CCNC: 182 U/L (ref 30–300)
CO2 SERPL-SCNC: 30 MMOL/L (ref 20–32)
CREAT SERPL-MCNC: 1.08 MG/DL (ref 0.66–1.25)
GFR SERPL CREATININE-BSD FRML MDRD: 67 ML/MIN/1.7M2
GLUCOSE SERPL-MCNC: 104 MG/DL (ref 70–99)
HDLC SERPL-MCNC: 79 MG/DL
LDLC SERPL CALC-MCNC: 67 MG/DL
NONHDLC SERPL-MCNC: 90 MG/DL
POTASSIUM SERPL-SCNC: 4.6 MMOL/L (ref 3.4–5.3)
PROT SERPL-MCNC: 7.2 G/DL (ref 6.8–8.8)
SODIUM SERPL-SCNC: 143 MMOL/L (ref 133–144)
TRIGL SERPL-MCNC: 112 MG/DL

## 2018-05-14 DIAGNOSIS — G47.00 INSOMNIA, UNSPECIFIED TYPE: ICD-10-CM

## 2018-05-14 RX ORDER — ZALEPLON 5 MG/1
15 CAPSULE ORAL AT BEDTIME
Qty: 120 CAPSULE | Refills: 0
Start: 2018-05-14 | End: 2018-06-18

## 2018-05-14 NOTE — TELEPHONE ENCOUNTER
Last filled for #120 on 3/13/18    Last seen 4/10/17, and no future appt on file.   Message relayed to pt needs visit for further refills.    TO MD to diaz Mccauley RN  May 14, 2018 9:01 AM

## 2018-05-15 DIAGNOSIS — F41.9 ANXIETY: ICD-10-CM

## 2018-05-15 RX ORDER — CLONAZEPAM 1 MG/1
1 TABLET ORAL DAILY
Qty: 30 TABLET | Refills: 0 | Status: SHIPPED | OUTPATIENT
Start: 2018-05-17 | End: 2018-06-18

## 2018-05-15 NOTE — TELEPHONE ENCOUNTER
Last office visit: 04/10/2017, no future appointment.  Last refill 04/17/2017, due to fill on 05/17/2018, Needs follow-up visit:

## 2018-06-11 ENCOUNTER — MYC MEDICAL ADVICE (OUTPATIENT)
Dept: CARDIOLOGY | Facility: CLINIC | Age: 73
End: 2018-06-11

## 2018-06-11 DIAGNOSIS — F34.1 DYSTHYMIA: ICD-10-CM

## 2018-06-18 DIAGNOSIS — G47.00 INSOMNIA, UNSPECIFIED TYPE: ICD-10-CM

## 2018-06-18 DIAGNOSIS — F41.9 ANXIETY: ICD-10-CM

## 2018-06-18 RX ORDER — CLONAZEPAM 1 MG/1
1 TABLET ORAL DAILY
Qty: 30 TABLET | Refills: 0 | Status: SHIPPED | OUTPATIENT
Start: 2018-06-18 | End: 2018-07-11

## 2018-06-18 RX ORDER — ZALEPLON 5 MG/1
15 CAPSULE ORAL AT BEDTIME
Qty: 120 CAPSULE | Refills: 0
Start: 2018-06-18 | End: 2018-09-24

## 2018-06-18 NOTE — TELEPHONE ENCOUNTER
Rx called in to pt's pharmacy.  Told pharmacist final fill, pt needs visit.  Must keep upcoming appt.

## 2018-06-18 NOTE — TELEPHONE ENCOUNTER
Last office visit:  04/10/2017, future appointment made for  07/10/2018, last refill 05/14/2018, due for refill .      Will only give one month supply to get to appt , To Mey to Betzy perales RN  June 18, 2018 4:58 PM

## 2018-07-10 ENCOUNTER — OFFICE VISIT (OUTPATIENT)
Dept: FAMILY MEDICINE | Facility: CLINIC | Age: 73
End: 2018-07-10
Payer: MEDICARE

## 2018-07-10 VITALS
HEIGHT: 66 IN | SYSTOLIC BLOOD PRESSURE: 137 MMHG | HEART RATE: 62 BPM | OXYGEN SATURATION: 95 % | WEIGHT: 174.75 LBS | DIASTOLIC BLOOD PRESSURE: 84 MMHG | BODY MASS INDEX: 28.09 KG/M2 | TEMPERATURE: 97.9 F

## 2018-07-10 DIAGNOSIS — E78.5 HYPERLIPIDEMIA LDL GOAL <160: ICD-10-CM

## 2018-07-10 DIAGNOSIS — I10 ESSENTIAL HYPERTENSION: ICD-10-CM

## 2018-07-10 DIAGNOSIS — Z00.00 MEDICARE ANNUAL WELLNESS VISIT, SUBSEQUENT: Primary | ICD-10-CM

## 2018-07-10 DIAGNOSIS — F34.1 DYSTHYMIA: ICD-10-CM

## 2018-07-10 ASSESSMENT — PAIN SCALES - GENERAL: PAINLEVEL: NO PAIN (0)

## 2018-07-10 NOTE — PROGRESS NOTES
CHIEF COMPLAINT: Medication Recheck + Medicare Annual Wellness Visit, Subsequent.      HISTORY OF PRESENT ILLNESS: Amari Eddy is a 73 year old male who presents for medication renewal. Amari was last here in April for lab work, his lipid panel looks great. He is compliant with atorvastatin 40 mg daily. His blood pressure today was 137/84, and he is compliant with losartan 100 mg daily. His weight has been stable, Body mass index is 28.22 kg/(m^2). He hasn't been going to the gym but is thinking of starting up again.     Amari notes his arthritis is getting worse. He has chronic low level pain at the base of spine and the base of his thumbs. He isn't interested in taking any medication at this time. He will schedule an appointment to come back and discuss this further with me.     For the past 4-5 years he has had strange dreams occasionally (not harmful, bad, or upsetting) he will start out trying to accomplish something, for example going from point A to point B. He is never able to accomplish it, he will wake up before. This strikes him as highly unusual.     MEDICARE PREVENTATIVE VISIT Q'S:   He has no difficulty with any of his activities of daily living.  He has had no falls at home.  No adaptive devices or use.  No depression. Not concerned about memory loss.     HEALTHCARE MAINTENANCE: He has glasses and needs to go into the eye doctor. No changes in his hearing. Up to date on dental visits and immunizations. His colonoscopy will be due in 2019. Blood pressure 137/84. Body mass index is 28.22 kg/(m^2). Weight has been fairly stable the past few years, 174 lbs 12 oz.     FAMILY, SOCIAL AND PAST SURGICAL HISTORIES:  Amari is semi-retired, he was previously working 60 hours a week. He is looking for a hobby. He just moved to [place]. He and his wife are going to be getting a new dog.       MEDICATIONS:  Carefully reviewed.       REVIEW OF SYSTEMS:  A 10-point review is negative except as  "otherwise noted.      OBJECTIVE:    GENERAL: Amari is in no distress.  Affect is upbeat.     VITAL SIGNS: /84 (BP Location: Right arm, Patient Position: Chair, Cuff Size: Adult Regular)  Pulse 62  Temp 97.9  F (36.6  C) (Oral)  Ht 5' 5.98\" (167.6 cm)  Wt 174 lb 12 oz (79.3 kg)  SpO2 95%  BMI 28.22 kg/m2     PE not performed; lab reviewed in detail.    LABORATORY DATA: none today      ASSESSMENT AND PLAN:   1. Medicare Annual Wellness Visit, Subsequent.  2. HTN, good control  3. Hyperlipidemeia, good control  4. Mild OA, hands/thumbs  5. RTC in ~1 year.     Call with any problems or questions.          I, Renee Pop, am serving as a scribe to document services personally performed by Dr. José Bo, based on data collection and the provider's statements to me.       "

## 2018-07-10 NOTE — MR AVS SNAPSHOT
"              After Visit Summary   7/10/2018    Amari Eddy    MRN: [id number]           Patient Information     Date Of Birth          1945        Visit Information        Provider Department      7/10/2018 4:00 PM José Bo MD [facility]        Today's Diagnoses     Medicare annual wellness visit, subsequent    -  1    Hyperlipidemia LDL goal <160        Essential hypertension        Dysthymia           Follow-ups after your visit        Follow-up notes from your care team     Return in about 1 year (around 7/10/2019) for Medicare Annual Wellness Visit.      Who to contact     Please call your clinic at [phone] to:    Ask questions about your health    Make or cancel appointments    Discuss your medicines    Learn about your test results    Speak to your doctor            Additional Information About Your Visit        MyChart Information     [employer] gives you secure access to your electronic health record. If you see a primary care provider, you can also send messages to your care team and make appointments. If you have questions, please call your primary care clinic.  If you do not have a primary care provider, please call [phone] and they will assist you.      [employer] is an electronic gateway that provides easy, online access to your medical records. With [employer], you can request a clinic appointment, read your test results, renew a prescription or communicate with your care team.     To access your existing account, please contact your [facility] or call [phone] for assistance.        Care EveryWhere ID     This is your Care EveryWhere ID. This could be used by other organizations to access your [facility] medical records  [id number]        Your Vitals Were     Pulse Temperature Height Pulse Oximetry BMI (Body Mass Index)       62 97.9  F (36.6  C) (Oral) 5' 5.98\" (167.6 cm) 95% 28.22 kg/m2        Blood Pressure from Last 3 Encounters: "   07/10/18 137/84   05/25/17 138/77   04/10/17 166/88    Weight from Last 3 Encounters:   07/10/18 174 lb 12 oz (79.3 kg)   05/25/17 181 lb (82.1 kg)   04/10/17 177 lb (80.3 kg)              Today, you had the following     No orders found for display       Primary Care Provider Office Phone # Fax #    José Bo [phone]       [address] [place] [address]        Equal Access to Services     [facility]: Hadii aad remy hadasho Soomaali, waaxda luqadaha, qaybta kaalmada adeegyajocelyn, trish davila . So [facility] [phone].    ATENCIÓN: Si habla español, tiene a martínez disposición servicios gratuitos de asistencia lingüística. [facility] [phone].    We comply with applicable federal civil rights laws and [place] laws. We do not discriminate on the basis of race, color, national origin, age, disability, sex, sexual orientation, or gender identity.            Thank you!     Thank you for choosing [facility]  for your care. Our goal is always to provide you with excellent care. Hearing back from our patients is one way we can continue to improve our services. Please take a few minutes to complete the written survey that you may receive in the mail after your visit with us. Thank you!             Your Updated Medication List - Protect others around you: Learn how to safely use, store and throw away your medicines at www.disposemymeds.org.          This list is accurate as of 7/10/18 11:59 PM.  Always use your most recent med list.                   Brand Name Dispense Instructions for use Diagnosis    aspirin 81 MG tablet      Take 1 tablet by mouth daily.        atorvastatin 40 MG tablet    LIPITOR    135 tablet    Alternate between 40 mg and 60 mg every other day for 3 weeks, then increase to 60 mg daily thereafter. Repeat labs in 3 months.    Hyperlipidemia LDL goal <70       Calcium Malate Powd      500-1,000 mg daily.        CLARITIN PO      Take  by mouth as needed        FLUoxetine 20 MG capsule    PROzac    90 capsule    Take 1 capsule (20 mg) by mouth daily Needs to be seen    Dysthymia       losartan 100 MG tablet    COZAAR    90 tablet    Take 1 tablet (100 mg) by mouth daily    Essential hypertension with goal blood pressure less than 140/90       methylsulfonylmethane 1000 MG Caps capsule    MSM     Take 2,000 mg by mouth daily.        zaleplon 5 MG capsule    SONATA    120 capsule    Take 3 capsules (15 mg) by mouth At Bedtime May repeat with one capsule ( 5 mg) during night  if needed.  May only fill once every 30 days    Insomnia, unspecified type       ZETIA 10 MG tablet   Generic drug:  ezetimibe      Take 1 tablet by mouth daily.

## 2018-07-10 NOTE — NURSING NOTE
"73 year old  Chief Complaint   Patient presents with     Recheck Medication     Yearly Rx renewal and check.        Blood pressure 137/84, pulse 62, temperature 97.9  F (36.6  C), temperature source Oral, height 5' 5.98\" (167.6 cm), weight 174 lb 12 oz (79.3 kg), SpO2 95 %. Body mass index is 28.22 kg/(m^2).  Patient Active Problem List   Diagnosis     Insomnia     Preventative health care     Dysthymia     Essential hypertension     Hyperlipidemia LDL goal <160       Wt Readings from Last 2 Encounters:   07/10/18 174 lb 12 oz (79.3 kg)   05/25/17 181 lb (82.1 kg)     BP Readings from Last 3 Encounters:   07/10/18 137/84   05/25/17 138/77   04/10/17 166/88         Current Outpatient Prescriptions   Medication     aspirin 81 MG tablet     atorvastatin (LIPITOR) 40 MG tablet     Calcium Malate POWD     clonazePAM (KLONOPIN) 1 MG tablet     ezetimibe (ZETIA) 10 MG tablet     FLUoxetine (PROZAC) 20 MG capsule     Loratadine (CLARITIN PO)     losartan (COZAAR) 100 MG tablet     Methylsulfonylmethane (MSM) 1000 MG CAPS     zaleplon (SONATA) 5 MG capsule     No current facility-administered medications for this visit.        Social History   Substance Use Topics     Smoking status: Never Smoker     Smokeless tobacco: Never Used     Alcohol use Yes       Health Maintenance Due   Topic Date Due     DEPRESSION ACTION PLAN Q1 YR  01/01/1963     ADVANCE DIRECTIVE PLANNING Q5 YRS  01/01/2000     PHQ-9 Q1 MONTH  05/10/2017     PHQ-9 Q3 MONTHS  07/10/2017     FALL RISK ASSESSMENT  04/10/2018       No results found for: CHRISTIANNE Braun MA  July 10, 2018 3:54 PM    "

## 2018-07-11 DIAGNOSIS — F41.9 ANXIETY: ICD-10-CM

## 2018-07-11 RX ORDER — CLONAZEPAM 1 MG/1
1 TABLET ORAL DAILY
Qty: 30 TABLET | Refills: 0 | Status: SHIPPED | OUTPATIENT
Start: 2018-07-18 | End: 2018-08-10

## 2018-07-11 NOTE — TELEPHONE ENCOUNTER
Last office 07/10/2018, no future appointment.  Last refill 06/18/2018, Next refill due 07/18/2018.

## 2018-07-11 NOTE — TELEPHONE ENCOUNTER
I called this prescription for the Clonazepam in to the [facility]  In [place], [place] Approved per Dr. Bo.  Meño RAVI  [facility]  July 11, 2018 2:29 PM

## 2018-07-12 ASSESSMENT — ANXIETY QUESTIONNAIRES
GAD7 TOTAL SCORE: 0
1. FEELING NERVOUS, ANXIOUS, OR ON EDGE: NOT AT ALL
5. BEING SO RESTLESS THAT IT IS HARD TO SIT STILL: NOT AT ALL
2. NOT BEING ABLE TO STOP OR CONTROL WORRYING: NOT AT ALL
6. BECOMING EASILY ANNOYED OR IRRITABLE: NOT AT ALL
3. WORRYING TOO MUCH ABOUT DIFFERENT THINGS: NOT AT ALL
7. FEELING AFRAID AS IF SOMETHING AWFUL MIGHT HAPPEN: NOT AT ALL

## 2018-07-12 ASSESSMENT — PATIENT HEALTH QUESTIONNAIRE - PHQ9: 5. POOR APPETITE OR OVEREATING: NOT AT ALL

## 2018-07-13 ASSESSMENT — ANXIETY QUESTIONNAIRES: GAD7 TOTAL SCORE: 0

## 2018-07-13 ASSESSMENT — PATIENT HEALTH QUESTIONNAIRE - PHQ9: SUM OF ALL RESPONSES TO PHQ QUESTIONS 1-9: 1

## 2018-07-16 ENCOUNTER — TELEPHONE (OUTPATIENT)
Dept: FAMILY MEDICINE | Facility: CLINIC | Age: 73
End: 2018-07-16

## 2018-07-17 ENCOUNTER — TELEPHONE (OUTPATIENT)
Dept: FAMILY MEDICINE | Facility: CLINIC | Age: 73
End: 2018-07-17

## 2018-07-17 NOTE — TELEPHONE ENCOUNTER
Patients wife is calling asking for the Clonazepam to be filled 1 day early.  I spoke with Dr. Bo and he said it would be OK to refill the prescription that is dated for the 18 th to be filled on the 17 th instead.  I called [facility] pharmacy and spoke with Latoya, also left message on patient's cell phone.

## 2018-07-23 NOTE — TELEPHONE ENCOUNTER
Last office visit: 7/10/18, no future appointments.    Prescription approved per [facility] Refill Protocol.      Laila Mccauley RN  July 24, 2018 10:08 AM

## 2018-08-10 DIAGNOSIS — F41.9 ANXIETY: ICD-10-CM

## 2018-08-10 RX ORDER — CLONAZEPAM 1 MG/1
1 TABLET ORAL DAILY
Qty: 30 TABLET | Refills: 0
Start: 2018-08-13 | End: 2018-09-12

## 2018-08-10 NOTE — TELEPHONE ENCOUNTER
Last visit 7/10/18  Last refill 7/17/18 - asking for early travel refill - needs 8/13/18 - would be due 8/16. OK for refill per MD.

## 2018-08-20 NOTE — TELEPHONE ENCOUNTER
Last visit 4/10/17 - pt making appt before next refill  Last refill 5/17/18  
Script for clonazepam phoned to [facility]  
[phone]

## 2018-09-12 DIAGNOSIS — I10 ESSENTIAL HYPERTENSION WITH GOAL BLOOD PRESSURE LESS THAN 140/90: ICD-10-CM

## 2018-09-12 DIAGNOSIS — F41.9 ANXIETY: ICD-10-CM

## 2018-09-12 RX ORDER — CLONAZEPAM 1 MG/1
1 TABLET ORAL DAILY
Qty: 30 TABLET | Refills: 0
Start: 2018-09-12 | End: 2018-10-12

## 2018-09-12 NOTE — TELEPHONE ENCOUNTER
Script for clonazepam phoned to [facility].  Evelyn Mendez RN  Care Coordinator  [facility]

## 2018-09-12 NOTE — TELEPHONE ENCOUNTER
Last office visit: 7/10/18, no future appointments.   Prescription approved per [facility] Refill Protocol.  Evelyn Mendez RN  Care Coordinator  [facility]

## 2018-09-13 RX ORDER — LOSARTAN POTASSIUM 100 MG/1
100 TABLET ORAL DAILY
Qty: 90 TABLET | Refills: 4 | Status: SHIPPED | OUTPATIENT
Start: 2018-09-13 | End: 2019-09-19

## 2018-09-24 ENCOUNTER — TELEPHONE (OUTPATIENT)
Dept: FAMILY MEDICINE | Facility: CLINIC | Age: 73
End: 2018-09-24

## 2018-09-24 DIAGNOSIS — G47.00 INSOMNIA, UNSPECIFIED TYPE: ICD-10-CM

## 2018-09-24 RX ORDER — ZALEPLON 5 MG/1
15 CAPSULE ORAL AT BEDTIME
Qty: 120 CAPSULE | Refills: 0 | Status: SHIPPED | OUTPATIENT
Start: 2018-09-24 | End: 2018-11-30

## 2018-09-24 NOTE — TELEPHONE ENCOUNTER
I called this prescription for the Sonata in to the [facility].  Approved per Dr. Bo.  Meño RAVI  [facility]  September 24, 2018 1:52 PM

## 2018-09-25 NOTE — TELEPHONE ENCOUNTER
Prior Authorization Approval    Authorization Effective Date: 6/27/2018  Authorization Expiration Date: 9/25/2019  Medication: zaleplon (SONATA) 5 MG capsule-APPROVED  Approved Dose/Quantity:   Reference #:     Insurance Company: [employer] - Phone [phone] Fax [phone]  Expected CoPay:       CoPay Card Available:      Foundation Assistance Needed:    Which Pharmacy is filling the prescription (Not needed for infusion/clinic administered): [id number] - [address]  Pharmacy Notified: Yes  Patient Notified: Yes

## 2018-09-25 NOTE — TELEPHONE ENCOUNTER
Central Prior Authorization Team   [phone]    PA Initiation    Medication: zaleplon (SONATA) 5 MG capsule  Insurance Company: [employer] - Phone [phone] Fax [phone]  Pharmacy Filling the Rx: [facility] #[id number] - [address]  Filling Pharmacy Phone: [phone]  Filling Pharmacy Fax:    Start Date: 9/25/2018

## 2018-10-12 DIAGNOSIS — F41.9 ANXIETY: ICD-10-CM

## 2018-10-12 RX ORDER — CLONAZEPAM 1 MG/1
1 TABLET ORAL DAILY
Qty: 30 TABLET | Refills: 0 | Status: SHIPPED | OUTPATIENT
Start: 2018-10-12 | End: 2018-11-12

## 2018-10-12 NOTE — TELEPHONE ENCOUNTER
I called this prescription for the Klonopin into the [facility] pharmacy.  Approved per Dr. Bo.  Meño RAVI  [facility]  October 12, 2018 4:43 PM

## 2018-11-12 DIAGNOSIS — F41.9 ANXIETY: ICD-10-CM

## 2018-11-12 RX ORDER — CLONAZEPAM 1 MG/1
1 TABLET ORAL DAILY
Qty: 30 TABLET | Refills: 0
Start: 2018-11-12 | End: 2018-12-10

## 2018-11-12 NOTE — TELEPHONE ENCOUNTER
Last seen 7/10/18    Last filled for # 30 on 10/12/18,  On time for refill    To MD to diaz Mccauley RN  November 12, 2018 10:50 AM

## 2018-11-30 DIAGNOSIS — G47.00 INSOMNIA, UNSPECIFIED TYPE: ICD-10-CM

## 2018-11-30 RX ORDER — ZALEPLON 5 MG/1
15 CAPSULE ORAL AT BEDTIME
Qty: 120 CAPSULE | Refills: 0
Start: 2018-11-30 | End: 2019-01-29

## 2018-11-30 NOTE — TELEPHONE ENCOUNTER
Last visit 7/10/18  Last refill 9/24/18 - OK to refill  Evelyn Mednez RN  Care Coordinator  [facility]

## 2018-11-30 NOTE — TELEPHONE ENCOUNTER
Script for zaleplon phoned to [facility] pharmacy  Evelyn Mendez RN  Care Coordinator  [facility]

## 2018-12-07 ENCOUNTER — TELEPHONE (OUTPATIENT)
Dept: FAMILY MEDICINE | Facility: CLINIC | Age: 73
End: 2018-12-07

## 2018-12-07 DIAGNOSIS — H10.32 ACUTE CONJUNCTIVITIS OF LEFT EYE: Primary | ICD-10-CM

## 2018-12-07 RX ORDER — POLYMYXIN B SULFATE AND TRIMETHOPRIM 1; 10000 MG/ML; [USP'U]/ML
SOLUTION OPHTHALMIC
Qty: 1 BOTTLE | Refills: 0 | Status: SHIPPED | OUTPATIENT
Start: 2018-12-07 | End: 2021-05-26

## 2018-12-07 NOTE — TELEPHONE ENCOUNTER
Pt walked into clinic - states he has had developing redness left sclera, itching, purulent drainage form left eye, for past 2 days. He does not have s/s URI. . Meets conjunctivitis protocol - polytrim eye drops ordered per protocol to pharmacy.  Pt instructed re: good handwashing. He agrees with plan - he will call back prn concerns.  Evelyn Mendez RN  Care Coordinator  [facility]

## 2018-12-10 DIAGNOSIS — F41.9 ANXIETY: ICD-10-CM

## 2018-12-10 RX ORDER — CLONAZEPAM 1 MG/1
1 TABLET ORAL DAILY
Qty: 30 TABLET | Refills: 0
Start: 2018-12-11 | End: 2019-01-14

## 2018-12-10 NOTE — TELEPHONE ENCOUNTER
Last seen 7/10/18    Last filled for # 30 on 11/12/18, must last 30 days.  Cueing up to MD for refill dated 12/11/18    Will be on time for refill then,  To MD to diaz Mccauley RN  December 10, 2018 10:08 AM

## 2018-12-15 DIAGNOSIS — E78.5 HYPERLIPIDEMIA LDL GOAL <70: ICD-10-CM

## 2018-12-17 RX ORDER — ATORVASTATIN CALCIUM 40 MG/1
TABLET, FILM COATED ORAL
Qty: 135 TABLET | Refills: 1 | Status: SHIPPED | OUTPATIENT
Start: 2018-12-17 | End: 2019-07-01

## 2019-01-14 DIAGNOSIS — F41.9 ANXIETY: ICD-10-CM

## 2019-01-14 RX ORDER — CLONAZEPAM 1 MG/1
1 TABLET ORAL DAILY
Qty: 30 TABLET | Refills: 0 | Status: SHIPPED | OUTPATIENT
Start: 2019-01-14 | End: 2019-02-13

## 2019-01-14 NOTE — TELEPHONE ENCOUNTER
Last Office Visit: 7/10/18  Future [facility] Appointments: none  Medication last refilled: 12/11/18    Routing refill request to provider for review/approval because: Drug not on the [facility] refill protocol     Mila Quiros RN  01/14/19  2:02 PM

## 2019-01-29 DIAGNOSIS — G47.00 INSOMNIA, UNSPECIFIED TYPE: ICD-10-CM

## 2019-01-29 NOTE — TELEPHONE ENCOUNTER
Last seen 7/10/18    Last filled for # 120 on 11/30/18.  Note this last refill lasted him 2 months., longer than usual      On time for refill.    To Md to diaz Mccauley RN  January 29, 2019 2:10 PM

## 2019-01-30 RX ORDER — ZALEPLON 5 MG/1
15 CAPSULE ORAL AT BEDTIME
Qty: 120 CAPSULE | Refills: 0
Start: 2019-01-30 | End: 2019-04-01

## 2019-02-13 DIAGNOSIS — F41.9 ANXIETY: ICD-10-CM

## 2019-02-13 RX ORDER — CLONAZEPAM 1 MG/1
1 TABLET ORAL DAILY
Qty: 30 TABLET | Refills: 0
Start: 2019-02-13 | End: 2019-03-14

## 2019-02-13 NOTE — TELEPHONE ENCOUNTER
Last Office Visit: 7/10/18  Future [facility] Appointments: none  Medication last refilled: 1/14/19    Routing refill request to provider for review/approval because: Drug not on the [facility] refill protocol     Mila Quiros RN  02/13/19  4:49 PM

## 2019-02-22 ENCOUNTER — OFFICE VISIT (OUTPATIENT)
Dept: FAMILY MEDICINE | Facility: CLINIC | Age: 74
End: 2019-02-22
Payer: MEDICARE

## 2019-02-22 VITALS
DIASTOLIC BLOOD PRESSURE: 89 MMHG | HEART RATE: 77 BPM | RESPIRATION RATE: 16 BRPM | WEIGHT: 175.75 LBS | OXYGEN SATURATION: 95 % | SYSTOLIC BLOOD PRESSURE: 129 MMHG | TEMPERATURE: 98.1 F | BODY MASS INDEX: 28.38 KG/M2

## 2019-02-22 DIAGNOSIS — A09 TRAVELER'S DIARRHEA: ICD-10-CM

## 2019-02-22 DIAGNOSIS — Z12.11 SCREENING FOR COLON CANCER: ICD-10-CM

## 2019-02-22 DIAGNOSIS — Z71.84 ENCOUNTER FOR COUNSELING FOR TRAVEL: Primary | ICD-10-CM

## 2019-02-22 RX ORDER — AZITHROMYCIN 250 MG/1
TABLET, FILM COATED ORAL
Qty: 6 TABLET | Refills: 0 | Status: SHIPPED | OUTPATIENT
Start: 2019-02-22 | End: 2019-04-16

## 2019-02-22 NOTE — PROGRESS NOTES
"CHIEF COMPLAINT: Travel clinic, arthritis, and other concerns      HISTORY OF PRESENT ILLNESS: Amari Eddy is a 74 year old male who presents for the above.     He is traveling to [place], traveling from [facility] [place] [address].    He reports he was taking sudafed (not diphenhydramine) last night and had 2 alcoholic drinks. He slipped down the stairs carrying his dog. He notes no injury. We discussed that this was possibly because he was off-balance carrying his dog.     Amari reports typically has a bowel movement 30 minutes after eating. He reports 2 episodes of fecal incontinence, \"complete elimination\" in the past 3 months, both while he was skiing and on a ski lift when he couldn't get to a bathroom in time. He has had small episodes of \"leaking\" in the past when he is not able to make it to a bathroom fast enough but nothing like these episodes.     Amari reports his arthritis is worsening, he has not tried any medications for it before. The arthritis is in his lower back and his thumbs bilaterally.     Amari takes Sonata 10 mg and clonazepam 1 mg daily for insomnia, he has been taking this for 15 years. He has taken Sonata 20 mg in the past but tapered down. His insurance company is calling him and recommending he try a new medication. Discussed that these medications may cause more falls.  He is very reluctant to make any changes to his medication regimen that pre-dates me by several years.    FAMILY, SOCIAL AND PAST SURGICAL HISTORIES:  Unchanged.       MEDICATIONS:  Carefully reviewed.       REVIEW OF SYSTEMS:  A 10-point review is negative except as otherwise noted.     Travel Clinic  Itinerary: (List all countries)  [place]  Departure Date: May 3rd 2019, Return Date: May 14 2019  Reason for Travel (i.e. business, pleasure): Pleasure  Visiting an urban or rural area? Both  Accommodations (i.e. hotel, hostel, friends, family etc.): Boat  Women - First day " of your last period: N/A    IMMUNIZATION HISTORY  Have you received any vaccinations in the past 4 weeks?  No   Have you ever fainted from having your blood drawn or from an injection?  No  Have you ever had a fever reaction to a vaccination?  No  Have you had any bad reaction / side effect from any vaccination?  No  Have you ever had hepatitis A or B vaccine?  No  Do you live (or work closely with anyone who has AIDS, or any other immune disorder, or who is on chemotherapy for cancer or family history of immunodeficiency?  No  Have you received any injection of immune globulin or any blood products during the past 12 months?  No    GENERAL MEDICAL HISTORY  Do you have a medical condition that warrants maintenance meds or physician follow-up?  Yes  Do you have a medical condition that is stable now, but that may recur while traveling?  No  Has your spleen been removed?   No  Have you had an acute illness or a fever in the past 48 hours?  No  Are you pregnant or might you become pregnant on this trip? Any chance of pregnancy?  No  Are you breastfeeding?  No  Do you have HIV, AIDS, an AIDS-like condition, any other immune disorder, leukemia or cancer?  No  Do you have a severe combined immunodeficiency disease?  No  Have you had your thymus gland removed or a history of problems with your thymus, such as myasthenia gravis, DiGeorge syndrome, or thymoma?  No  Do you have severe thrombocytopenia (low platelet count) or blood clotting disorder?  No  Have you ever had a convulsion, seizure, epilepsy, neurologic condition or brain infection?  No  Do you have any stomach conditions?  No  Do you have a G6PD deficiency?  No  Do you have severe renal or kidney impairment?  No  Do you have a history of psychiatric problems?  No  Do you have a problem with strange dreams and/or nightmares?  No  Do you have insomnia?  No  Do you have problems with vaginitis?  No  Do you have psoriasis?  No  Are you prone to motion sickness?   No  Have you ever had headaches, nausea, vomiting or breathing problems from altitude exposure?  No    MEDICATIONS  ARE YOU TAKING:  Steroids, prednisone, or anti-cancer drugs?  No  Antibiotics or sulfonamides?  No  Oral contraceptives?  No  Aspirin therapy? (children & adolescents)  Yes    ALLERGIES  ARE YOU ALLERGIC TO:  Any medications?  No  Any foods or other?  No    Immunizations discussed include: He is due to Hepatitis A today, he will return after August 2019 for the second vaccine. Seasonal influenza vaccine up to date. Tdap up to date.   Malaraia prophylaxis recommended: Not indicated.   Symptomatic treatment for traveler's diarrhea: Azithromycin.     ASSESSMENT/PLAN:  1. Travel Clinic. He is due for Hepatitis A vaccine today. He will return for the second vaccine after August 2019. Influenza and Tdap vaccines up to date. Azythromycin sent to his pharmacy for travelers diarrhea.   2. Arthritis. I recommend tylenol extra strength 1000 mg TID for 2 weeks--to see if that helps.  3. Fecal incontinence. We discussed watching his caffeine intake and monitoring what he eats. He will contact me if he would like a referral to colorectal surgery.     I have reviewed general recommendations for safe travel including: food/water precautions, insect avoidance, safe sex practices given high prevalence of HIV and other STDs, roadway safety. Educational materials and links to the CDC Traveler's health website have been provided.    Total time: Over 25 minutes, greater than 50 percent in counseling and coordination of care.    I, Renee Pop, am serving as a scribe to document services personally performed by Dr. José Bo, based on data collection and the provider's statements to me. Dr. Bo has reviewed, edited, and approved the above note.

## 2019-02-22 NOTE — NURSING NOTE
Screening Questionnaire for Adult Immunization    Are you sick today?   No   Do you have allergies to medications, food, a vaccine component or latex?   No   Have you ever had a serious reaction after receiving a vaccination?   No   Do you have a long-term health problem with heart disease, lung disease, asthma, kidney disease, metabolic disease (e.g. diabetes), anemia, or other blood disorder?   No   Do you have cancer, leukemia, HIV/AIDS, or any other immune system problem?   No   In the past 3 months, have you taken medications that affect  your immune system, such as prednisone, other steroids, or anticancer drugs; drugs for the treatment of rheumatoid arthritis, Crohn s disease, or psoriasis; or have you had radiation treatments?   No   Have you had a seizure, or a brain or other nervous system problem?   No   During the past year, have you received a transfusion of blood or blood     products, or been given immune (gamma) globulin or antiviral drug?   No   For women: Are you pregnant or is there a chance you could become        pregnant during the next month?   No   Have you received any vaccinations in the past 4 weeks?   No     Immunization questionnaire answers were all negative.      Given by Elmira Joe. Patient instructed to remain in clinic for 15 minutes afterwards, and to report any adverse reaction to me immediately.       Screening performed by Elmira Joe on 2/22/2019 at 10:31 AM.

## 2019-02-22 NOTE — NURSING NOTE
74 year old  Chief Complaint   Patient presents with     Travel Advice       Blood pressure 129/89, pulse 77, temperature 98.1  F (36.7  C), temperature source Oral, resp. rate 16, weight 79.7 kg (175 lb 12 oz), SpO2 95 %. Body mass index is 28.38 kg/m .  Patient Active Problem List   Diagnosis     Insomnia     Preventative health care     Dysthymia     Essential hypertension     Hyperlipidemia LDL goal <160       Wt Readings from Last 2 Encounters:   02/22/19 79.7 kg (175 lb 12 oz)   07/10/18 79.3 kg (174 lb 12 oz)     BP Readings from Last 3 Encounters:   02/22/19 129/89   07/10/18 137/84   05/25/17 138/77         Current Outpatient Medications   Medication     aspirin 81 MG tablet     atorvastatin (LIPITOR) 40 MG tablet     Calcium Malate POWD     clonazePAM (KLONOPIN) 1 MG tablet     ezetimibe (ZETIA) 10 MG tablet     FLUoxetine (PROZAC) 20 MG capsule     Loratadine (CLARITIN PO)     losartan (COZAAR) 100 MG tablet     Methylsulfonylmethane (MSM) 1000 MG CAPS     trimethoprim-polymyxin b (POLYTRIM) 68626-1.1 UNIT/ML-% ophthalmic solution     zaleplon (SONATA) 5 MG capsule     No current facility-administered medications for this visit.        Social History     Tobacco Use     Smoking status: Never Smoker     Smokeless tobacco: Never Used   Substance Use Topics     Alcohol use: Yes     Drug use: No       Health Maintenance Due   Topic Date Due     DEPRESSION ACTION PLAN  01/01/1963     ZOSTER IMMUNIZATION (1 of 2) 01/01/1995     ADVANCE DIRECTIVE PLANNING Q5 YRS  01/01/2000     PHQ-9 Q1 MONTH  08/10/2018     PHQ-9 Q3 MONTHS  10/10/2018     COLON CANCER SCREEN (SYSTEM ASSIGNED)  12/01/2018       No results found for: PAP      February 22, 2019 9:47 AM

## 2019-03-14 DIAGNOSIS — F41.9 ANXIETY: ICD-10-CM

## 2019-03-14 RX ORDER — CLONAZEPAM 1 MG/1
1 TABLET ORAL DAILY
Qty: 30 TABLET | Refills: 0
Start: 2019-03-16 | End: 2019-04-16

## 2019-03-14 NOTE — TELEPHONE ENCOUNTER
Script for clonazepam phoned to [facility].  Evelyn Mendez RN  [facility]

## 2019-04-01 DIAGNOSIS — G47.00 INSOMNIA, UNSPECIFIED TYPE: ICD-10-CM

## 2019-04-01 RX ORDER — ZALEPLON 5 MG/1
15 CAPSULE ORAL AT BEDTIME
Qty: 120 CAPSULE | Refills: 0
Start: 2019-04-01 | End: 2019-06-04

## 2019-04-01 NOTE — TELEPHONE ENCOUNTER
Last seen 2/22/19, no future appt     Last filled for #120 on 1/30/19, with sig saying can use up to 4 at bedtime     On time for refill    To MD to diaz Mccauley RN  April 1, 2019 9:56 AM

## 2019-04-16 DIAGNOSIS — F41.9 ANXIETY: ICD-10-CM

## 2019-04-16 RX ORDER — CLONAZEPAM 1 MG/1
1 TABLET ORAL DAILY
Qty: 30 TABLET | Refills: 0 | Status: SHIPPED | OUTPATIENT
Start: 2019-04-16 | End: 2019-04-26

## 2019-04-26 DIAGNOSIS — F41.9 ANXIETY: ICD-10-CM

## 2019-04-26 NOTE — TELEPHONE ENCOUNTER
----- Message from Ana Maxwell sent at 4/26/2019 10:32 AM CDT -----  Regarding: Needs refills for travel  Nakul called in and will be traveling he stated that he will need a refill of his clonazepam because it will be out before he gets [place] the [place]. Please call him and let him know how to proceed.    Ana

## 2019-04-26 NOTE — TELEPHONE ENCOUNTER
Patient will be leaving for a trip to [place] 5/3/19-5/16/19 and he is requesting enough clonazepam to see him through until his return.     Med last ordered 4/16/19 #30   Next fill date of 30 days would be 5/16/19    Request is for  on 5/1/19 for #5 pills to see him through his vacation in case there are problems with his return.     Routing refill request to provider for review/approval because: Drug not on the [facility] refill protocol     Mila Quiros RN  04/26/19  4:51 PM

## 2019-04-29 RX ORDER — CLONAZEPAM 1 MG/1
1 TABLET ORAL DAILY
Qty: 5 TABLET | Refills: 0
Start: 2019-04-30 | End: 2019-05-21

## 2019-05-21 DIAGNOSIS — F41.9 ANXIETY: ICD-10-CM

## 2019-05-21 RX ORDER — CLONAZEPAM 1 MG/1
1 TABLET ORAL DAILY
Qty: 30 TABLET | Refills: 0
Start: 2019-05-21 | End: 2019-06-19

## 2019-06-04 DIAGNOSIS — G47.00 INSOMNIA, UNSPECIFIED TYPE: ICD-10-CM

## 2019-06-04 RX ORDER — ZALEPLON 5 MG/1
15 CAPSULE ORAL AT BEDTIME
Qty: 120 CAPSULE | Refills: 0
Start: 2019-06-04 | End: 2019-07-23

## 2019-06-19 DIAGNOSIS — F41.9 ANXIETY: ICD-10-CM

## 2019-06-19 RX ORDER — CLONAZEPAM 1 MG/1
1 TABLET ORAL DAILY
Qty: 30 TABLET | Refills: 0
Start: 2019-06-19 | End: 2019-07-22

## 2019-06-19 NOTE — TELEPHONE ENCOUNTER
Last seen 2/22/19    Last filled for # 30 on 5/21/19, on time for refill    To MD to diaz Bo unavailable,  To John Paul to diaz Mccauley RN  June 19, 2019 1:22 PM

## 2019-06-24 DIAGNOSIS — E78.5 HYPERLIPIDEMIA LDL GOAL <70: ICD-10-CM

## 2019-06-30 RX ORDER — ATORVASTATIN CALCIUM 40 MG/1
TABLET, FILM COATED ORAL
Qty: 20 TABLET | Refills: 0 | OUTPATIENT
Start: 2019-06-30

## 2019-06-30 NOTE — TELEPHONE ENCOUNTER
" atorvastatin (LIPITOR) 40 MG tablet   Last Written Prescription Date:  12/17/18  Last Fill Quantity: 135,   # refills: 1  Last Office Visit : 5/25/17  Future Office visit:  None      \"Return to clinic in one year\"  Last rf notation  \" Patient to follow up with Dr. Arango for further refills. Patient can have refilled by primary MD\"      Routing refill request to provider for review/approval because: refused. To pcp.  "

## 2019-07-01 DIAGNOSIS — E78.5 HYPERLIPIDEMIA LDL GOAL <70: ICD-10-CM

## 2019-07-01 RX ORDER — ATORVASTATIN CALCIUM 40 MG/1
TABLET, FILM COATED ORAL
Qty: 45 TABLET | Refills: 0 | Status: SHIPPED | OUTPATIENT
Start: 2019-07-01 | End: 2019-07-28

## 2019-07-01 NOTE — TELEPHONE ENCOUNTER
Last seen 2/22/19    Medication is being filled for 1 time refill only due to:  Patient needs labs lipid panel plus. Future labs ordered yes.     Spoke with wife and they agree to come in for labs and agree to one month supply .  Leaving in a few hours for vacation.    Laila Mccauley RN  July 1, 2019 9:07 AM

## 2019-07-10 DIAGNOSIS — E78.5 HYPERLIPIDEMIA LDL GOAL <70: ICD-10-CM

## 2019-07-10 LAB
ALT SERPL-CCNC: 40 U/L (ref 0–70)
AST SERPL-CCNC: 50 U/L (ref 0–55)
CHOLEST SERPL-MCNC: 210 MG/DL (ref 0–200)
CHOLEST/HDLC SERPL: 2.5 {RATIO} (ref 0–5)
FASTING SPECIMEN: YES
GLUCOSE SERPL-MCNC: 104 MG/DL (ref 60–109)
HDLC SERPL-MCNC: 84 MG/DL
LDLC SERPL CALC-MCNC: 90 MG/DL (ref 0–129)
TRIGL SERPL-MCNC: 176 MG/DL (ref 0–150)
VLDL-CHOLESTEROL: 35 (ref 7–32)

## 2019-07-22 DIAGNOSIS — F41.9 ANXIETY: ICD-10-CM

## 2019-07-22 DIAGNOSIS — F34.1 DYSTHYMIA: ICD-10-CM

## 2019-07-22 DIAGNOSIS — G47.00 INSOMNIA, UNSPECIFIED TYPE: ICD-10-CM

## 2019-07-22 RX ORDER — CLONAZEPAM 1 MG/1
1 TABLET ORAL DAILY
Qty: 30 TABLET | Refills: 0
Start: 2019-07-22 | End: 2019-08-17

## 2019-07-22 NOTE — TELEPHONE ENCOUNTER
Last visit 2/22/19  Last refill 6/19/19 - clonazepam  Eveyln Mendez RN  [facility]    Script for clonazepam phoned to [place] [phone] Harrison Mendez RN  [facility]

## 2019-07-23 DIAGNOSIS — G47.00 INSOMNIA, UNSPECIFIED TYPE: ICD-10-CM

## 2019-07-23 DIAGNOSIS — F34.1 DYSTHYMIA: ICD-10-CM

## 2019-07-23 RX ORDER — ZALEPLON 5 MG/1
15 CAPSULE ORAL AT BEDTIME
Qty: 120 CAPSULE | Refills: 0
Start: 2019-07-23 | End: 2019-09-17

## 2019-07-28 ENCOUNTER — MYC REFILL (OUTPATIENT)
Dept: FAMILY MEDICINE | Facility: CLINIC | Age: 74
End: 2019-07-28

## 2019-07-28 DIAGNOSIS — E78.5 HYPERLIPIDEMIA LDL GOAL <70: ICD-10-CM

## 2019-07-28 DIAGNOSIS — G47.00 INSOMNIA, UNSPECIFIED TYPE: ICD-10-CM

## 2019-07-30 RX ORDER — ATORVASTATIN CALCIUM 40 MG/1
TABLET, FILM COATED ORAL
Qty: 135 TABLET | Refills: 3 | Status: SHIPPED | OUTPATIENT
Start: 2019-07-30 | End: 2020-07-24

## 2019-07-30 RX ORDER — ZALEPLON 5 MG/1
15 CAPSULE ORAL AT BEDTIME
Qty: 120 CAPSULE | Refills: 0 | Status: CANCELLED | OUTPATIENT
Start: 2019-07-30

## 2019-07-30 NOTE — TELEPHONE ENCOUNTER
Last seen 2/22/19, no future appt     Prescription approved per [facility] Refill Protocol.   Lipid med    Laila Mccauley RN  July 30, 2019 9:05 AM      Sonata script was already called in to the pharmacy last week  Confirmed with pharmacy that they have script.  They will fill now, but have to order more pills in as they do not carry that much on hand.    Laila Mccauley RN  July 30, 2019 9:10 AM

## 2019-08-17 ENCOUNTER — MYC REFILL (OUTPATIENT)
Dept: FAMILY MEDICINE | Facility: CLINIC | Age: 74
End: 2019-08-17

## 2019-08-17 DIAGNOSIS — F41.9 ANXIETY: ICD-10-CM

## 2019-08-19 RX ORDER — CLONAZEPAM 1 MG/1
1 TABLET ORAL DAILY
Qty: 30 TABLET | Refills: 0
Start: 2019-08-20 | End: 2019-09-17

## 2019-08-19 NOTE — TELEPHONE ENCOUNTER
Last seen 2/22/19     Last filled for # 30 on 7/22/19,  OK to refill 8/20/19    Routing refill request to provider for review/approval because:  Drug not on the [facility] refill protocol     Laila Mccauley RN  August 19, 2019 9:00 AM

## 2019-08-20 ENCOUNTER — MYC REFILL (OUTPATIENT)
Dept: FAMILY MEDICINE | Facility: CLINIC | Age: 74
End: 2019-08-20

## 2019-08-20 DIAGNOSIS — F41.9 ANXIETY: ICD-10-CM

## 2019-08-20 RX ORDER — CLONAZEPAM 1 MG/1
1 TABLET ORAL DAILY
Qty: 30 TABLET | Refills: 0 | Status: CANCELLED | OUTPATIENT
Start: 2019-08-20

## 2019-08-21 NOTE — TELEPHONE ENCOUNTER
Med already refilled and  called in two days ago.  Called pharmacy and confirmed it is ready there to be picked up, and it is.     Mycharted pt back.    Laila Mccauley, RN,RN  August 21, 2019 10:31 AM

## 2019-09-18 DIAGNOSIS — I10 ESSENTIAL HYPERTENSION WITH GOAL BLOOD PRESSURE LESS THAN 140/90: ICD-10-CM

## 2019-09-18 NOTE — TELEPHONE ENCOUNTER
Last time prescribed: 9/13/18 , 90 tabs x 4 refills  Last office visit: 2/22/19 - TRAVEL VISIT  Next appointment: No future appointments    Patient due for Annual Wellness Visit, notified via [employer].     Medication is being filled for 1 time refill only due to:  Patient needs to be seen because needs annual exam and labs.     Mila Quiros RN  09/19/19  11:18 AM

## 2019-09-19 RX ORDER — LOSARTAN POTASSIUM 100 MG/1
100 TABLET ORAL DAILY
Qty: 90 TABLET | Refills: 0 | Status: SHIPPED | OUTPATIENT
Start: 2019-09-19 | End: 2019-12-09

## 2019-09-29 ENCOUNTER — HEALTH MAINTENANCE LETTER (OUTPATIENT)
Age: 74
End: 2019-09-29

## 2019-10-16 ENCOUNTER — MYC REFILL (OUTPATIENT)
Dept: FAMILY MEDICINE | Facility: CLINIC | Age: 74
End: 2019-10-16

## 2019-10-16 DIAGNOSIS — F41.9 ANXIETY: ICD-10-CM

## 2019-10-18 RX ORDER — CLONAZEPAM 1 MG/1
1 TABLET ORAL DAILY
Qty: 30 TABLET | Refills: 0 | Status: SHIPPED | OUTPATIENT
Start: 2019-10-18 | End: 2019-11-18

## 2019-10-18 NOTE — TELEPHONE ENCOUNTER
Last visit 2/22/19  Last refill 9/18/19 - checked    Evelyn Mendez RN  [facility]

## 2019-11-18 ENCOUNTER — MYC REFILL (OUTPATIENT)
Dept: FAMILY MEDICINE | Facility: CLINIC | Age: 74
End: 2019-11-18

## 2019-11-18 DIAGNOSIS — F41.9 ANXIETY: ICD-10-CM

## 2019-11-18 DIAGNOSIS — G47.00 INSOMNIA, UNSPECIFIED TYPE: ICD-10-CM

## 2019-11-18 RX ORDER — CLONAZEPAM 1 MG/1
1 TABLET ORAL DAILY
Qty: 30 TABLET | Refills: 0 | Status: SHIPPED | OUTPATIENT
Start: 2019-11-18 | End: 2019-12-16

## 2019-11-18 RX ORDER — CLONAZEPAM 1 MG/1
1 TABLET ORAL DAILY
Qty: 30 TABLET | Refills: 0 | Status: CANCELLED | OUTPATIENT
Start: 2019-11-18

## 2019-11-18 RX ORDER — ZALEPLON 5 MG/1
15 CAPSULE ORAL AT BEDTIME
Qty: 120 CAPSULE | Refills: 0 | Status: SHIPPED | OUTPATIENT
Start: 2019-11-18 | End: 2020-01-22

## 2019-11-18 NOTE — TELEPHONE ENCOUNTER
Added this request to the other controlled med request, to go to Dr. Bo, pt's primary.  Klonopin and sonata now on same refill enc to Betzy Mccauley RN  November 18, 2019 4:02 PM

## 2019-11-18 NOTE — TELEPHONE ENCOUNTER
Last seen 2/22/19,  Future appt 1/31/2020    Per  OK,   Sonata LF  # 120 on 9/19/19, lasting 2 months   Klonopin LF on 10/18/19 for # 30 , lasting one month per  sig    On time for both prn sig refills     Routing refill request to provider for review/approval because:  Drug not on the [facility] refill protocol     Laila Mccauley RN  November 18, 2019 3:59 PM

## 2019-11-19 ENCOUNTER — TELEPHONE (OUTPATIENT)
Dept: FAMILY MEDICINE | Facility: CLINIC | Age: 74
End: 2019-11-19

## 2019-11-21 NOTE — TELEPHONE ENCOUNTER
Central Prior Authorization Team   [phone]    PA Initiation    Medication: zaleplon (SONATA) 5 MG capsule  Insurance Company: [employer] - Phone [phone] Fax [phone]  [id number] [place], [place] - [address]  Filling Pharmacy Phone: [phone]  Filling Pharmacy Fax: [phone]  Start Date: 11/21/2019

## 2019-11-22 NOTE — TELEPHONE ENCOUNTER
Prior Authorization Approval    Authorization Effective Date: 8/23/2019  Authorization Expiration Date: 11/20/2020  Medication: zaleplon (SONATA) 5 MG capsule-PA APPROVED   Approved Dose/Quantity:   Reference #:     Insurance Company: [employer] - Phone [phone] Fax [phone]  Expected CoPay: $58.52     CoPay Card Available:      Foundation Assistance Needed:    Which Pharmacy is filling the prescription (Not needed for infusion/clinic administered): [id number] [place], [place] - [address]  Pharmacy Notified: Yes- **Instructed pharmacy to notify patient when script is ready to /ship.**   Patient Notified: Yes

## 2019-11-22 NOTE — TELEPHONE ENCOUNTER
Health Call Center    Phone Message    May a detailed message be left on voicemail: yes    Reason for Call: Medication Question or concern regarding medication   Prescription Clarification  Name of Medication: zaleplon (SONATA) 5 MG capsule  Prescribing Provider: José Bo MD    Pharmacy: [facility] [id number] [place], [place] - [address]   What on the order needs clarification?   The patient wanted to speak to the care team about the Prior Auth I did let him know to call Prior Auth Dept, patient wants to speak to care team instead please call to address concerns.           Action Taken: Message routed to:  [facility]: [facility]

## 2019-11-22 NOTE — TELEPHONE ENCOUNTER
Called patient and informed him the PA for his Rx is in process. Discussed option of paying out of pocket and finding least expensive option at [employer]. Try [employer] for coupon/savings. Patient verbalized understanding.     Mial Quiros RN  11/22/19  10:38 AM

## 2019-12-04 ENCOUNTER — TELEPHONE (OUTPATIENT)
Dept: FAMILY MEDICINE | Facility: CLINIC | Age: 74
End: 2019-12-04

## 2019-12-04 NOTE — TELEPHONE ENCOUNTER
Discussed pt message with Dr Bo - he would like pt seen in clinic for eval ; possibly experienced transient global amnesia episode. May need neuro followup and CT or EEG. Pt is concerned about possible stroke, as a friend told him he may have had that. Pt not experiencing any unusual symptoms today. States he was with his family on Sun at a restaurant when episode occurred. Family let him know his conversations were normal, behavior normal. He has not had any other known episodes since then.   Appt made in clinic tomorrow with Dr Bauer for eval.   Pt will call back prn.

## 2019-12-04 NOTE — TELEPHONE ENCOUNTER
[facility]    Phone Message    May a detailed message be left on voicemail: yes, t is wanting to get a call back at [phone], ask  to find Pt.     Reason for Call: Symptoms or Concerns     If patient has red-flag symptoms, warm transfer to triage line    Current symptom or concern: Per Pt states he had a black out this weekend and states wanting to get advice from NurseMariselaEvelyn is he should be seen by a MD.     Symptoms have been present for:  4 day(s)    Has patient previously been seen for this? No    By José Bo    Date: 12/4/2019    Are there any new or worsening symptoms? Yes: Pt states doesn't remember what happened that evening, or how he got home. Wife states did not notice anything unusual       Action Taken: Message routed to:  [facility]: [facility]

## 2019-12-05 ENCOUNTER — TELEPHONE (OUTPATIENT)
Dept: FAMILY MEDICINE | Facility: CLINIC | Age: 74
End: 2019-12-05

## 2019-12-05 ENCOUNTER — OFFICE VISIT (OUTPATIENT)
Dept: FAMILY MEDICINE | Facility: CLINIC | Age: 74
End: 2019-12-05
Payer: MEDICARE

## 2019-12-05 VITALS
RESPIRATION RATE: 16 BRPM | HEIGHT: 66 IN | TEMPERATURE: 97.7 F | OXYGEN SATURATION: 94 % | BODY MASS INDEX: 28.69 KG/M2 | WEIGHT: 178.5 LBS | HEART RATE: 59 BPM | DIASTOLIC BLOOD PRESSURE: 78 MMHG | SYSTOLIC BLOOD PRESSURE: 136 MMHG

## 2019-12-05 DIAGNOSIS — G45.4 TRANSIENT GLOBAL AMNESIA: Primary | ICD-10-CM

## 2019-12-05 ASSESSMENT — PATIENT HEALTH QUESTIONNAIRE - PHQ9
5. POOR APPETITE OR OVEREATING: NOT AT ALL
SUM OF ALL RESPONSES TO PHQ QUESTIONS 1-9: 2

## 2019-12-05 ASSESSMENT — ANXIETY QUESTIONNAIRES
2. NOT BEING ABLE TO STOP OR CONTROL WORRYING: NOT AT ALL
GAD7 TOTAL SCORE: 1
5. BEING SO RESTLESS THAT IT IS HARD TO SIT STILL: NOT AT ALL
7. FEELING AFRAID AS IF SOMETHING AWFUL MIGHT HAPPEN: NOT AT ALL
1. FEELING NERVOUS, ANXIOUS, OR ON EDGE: SEVERAL DAYS
6. BECOMING EASILY ANNOYED OR IRRITABLE: NOT AT ALL
IF YOU CHECKED OFF ANY PROBLEMS ON THIS QUESTIONNAIRE, HOW DIFFICULT HAVE THESE PROBLEMS MADE IT FOR YOU TO DO YOUR WORK, TAKE CARE OF THINGS AT HOME, OR GET ALONG WITH OTHER PEOPLE: NOT DIFFICULT AT ALL
3. WORRYING TOO MUCH ABOUT DIFFERENT THINGS: NOT AT ALL

## 2019-12-05 ASSESSMENT — MIFFLIN-ST. JEOR: SCORE: 1492.17

## 2019-12-05 NOTE — NURSING NOTE
"74 year old  Chief Complaint   Patient presents with     Blackout     pt had an blackout episode  at dinner with family on Dec. 1st he remebers going but remebers nothing during dinner and doesnt remeber how he got home.      RECHECK       Blood pressure 136/78, pulse 59, temperature 97.7  F (36.5  C), temperature source Oral, resp. rate 16, height 1.676 m (5' 5.98\"), weight 81 kg (178 lb 8 oz), SpO2 94 %. Body mass index is 28.82 kg/m .  Patient Active Problem List   Diagnosis     Insomnia     Preventative health care     Dysthymia     Essential hypertension     Hyperlipidemia LDL goal <160       Wt Readings from Last 2 Encounters:   12/05/19 81 kg (178 lb 8 oz)   02/22/19 79.7 kg (175 lb 12 oz)     BP Readings from Last 3 Encounters:   12/05/19 136/78   02/22/19 129/89   07/10/18 137/84         Current Outpatient Medications   Medication     aspirin 81 MG tablet     atorvastatin (LIPITOR) 40 MG tablet     clonazePAM (KLONOPIN) 1 MG tablet     ezetimibe (ZETIA) 10 MG tablet     FLUoxetine (PROZAC) 20 MG capsule     Loratadine (CLARITIN PO)     losartan (COZAAR) 100 MG tablet     Methylsulfonylmethane (MSM) 1000 MG CAPS     zaleplon (SONATA) 5 MG capsule     Calcium Malate POWD     trimethoprim-polymyxin b (POLYTRIM) 86307-2.1 UNIT/ML-% ophthalmic solution     No current facility-administered medications for this visit.        Social History     Tobacco Use     Smoking status: Never Smoker     Smokeless tobacco: Never Used   Substance Use Topics     Alcohol use: Yes     Drug use: No       Health Maintenance Due   Topic Date Due     ADVANCE CARE PLANNING  1945     DEPRESSION ACTION PLAN  1945     ZOSTER IMMUNIZATION (1 of 2) 01/01/1995     PHQ-9  10/10/2018     COLONOSCOPY  12/01/2018     MEDICARE ANNUAL WELLNESS VISIT  07/10/2019       No results found for: PAP      December 5, 2019 8:44 AM  "

## 2019-12-05 NOTE — TELEPHONE ENCOUNTER
[facility] Call Center    Phone Message    May a detailed message be left on voicemail: yes    Reason for Call: Order(s): Other: EEG order needs to be more detailed. Inpatient? EEG Routine 90 min? Or 3 hour video. Please call [phone] if further questions on this. Please call pt when order is in the chart so pt can call.  Reason for requested:  The order is not right in the chart.  Date needed: ASAP  Provider name: Dr Bauer      Action Taken: Message routed to:  [facility]: [facility]

## 2019-12-05 NOTE — PROGRESS NOTES
SUBJECTIVE:   Amari Eddy is a 74 year old male who presents to clinic today to discuss the following problem(s).    Reason for visit today is concern that his blackout may be a precursor to a stroke.    Blackout:  Dinner with family 7:30pm  while at dinner didn't feel abnormal. Went home fell asleep right away like normal. When he woke up the next morning he said the previous night was a complete blank and couldn't remember anything. He also had a long conversation with his son at dinner maybe 20 or 30 minutes, and again the next morning he couldn't remember even talking with his son during dinner the previous night. He does remember taking a cab to the restaurant with his family and remembers having a conversation with the  at his table before the meal and that is the last memory he had from the previous evening.    No falls, no trauma  Had 2 drinks (scotch on the rocks- which is his normal drink at [employer], the restaurant they went to) and a half a glass of wine. Wife did confirm that he did not appear to have had too much to drink at dinner.  No drinks before dinner    Hx of concussion 8-10 years ago he fell on his [employer] driveway while getting the news paper and symptoms lasted for about 9 months (sx; anytime he would exercise he would get a headache, no vomiting or headache)    1 brother  of a heart attack in his fifties but it was due to a mechanical valve dysfunction.  Daughter does have a history of seizure disorder  No personal history of stroke symptoms; facial droop, word slurring or TIA      CONSTITUTIONAL: NEGATIVE for chills, fatigue, fever,sweats and weight loss  RESP: NEGATIVE for cough, hemoptysis, SOB/dyspnea and wheezing  CV: NEGATIVE for chest pain/chest pressure, dyspnea on exertion, orthopnea and palpitations  GI: NEGATIVE for abdominal pain, diarrhea, dysphagia  : NEGATIVE for Dysuria, Frequency and Hematuria  MUSCULOSKELETAL: NEGATIVE for arthralgias, cyanosis,  "clubbing or edema  INTEGUMENTARY/SKIN: NEGATIVE for new lesions and pruritis  NEURO: Amnesic as detailed above. NEGATIVE for lingering dizziness/lightheadedness, gait disturbance and paresthesias   PSYCHIATRIC: NEGATIVE    PHQ-9 SCORE 4/12/2017 7/12/2018 12/5/2019   PHQ-9 Total Score - - -   PHQ-9 Total Score 4 1 2     EMMANUELLE-7 SCORE 4/12/2017 7/12/2018 12/5/2019   Total Score 0 0 1         Past Medical History:   Diagnosis Date     History of concussion      History reviewed. No pertinent surgical history.  Family History   Problem Relation Age of Onset     Cancer Mother      Alzheimer Disease Father      Social History     Tobacco Use     Smoking status: Never Smoker     Smokeless tobacco: Never Used   Substance Use Topics     Alcohol use: Yes     Drug use: No     Social History     Social History Narrative     Not on file       Current Outpatient Medications   Medication     aspirin 81 MG tablet     atorvastatin (LIPITOR) 40 MG tablet     clonazePAM (KLONOPIN) 1 MG tablet     ezetimibe (ZETIA) 10 MG tablet     FLUoxetine (PROZAC) 20 MG capsule     Loratadine (CLARITIN PO)     losartan (COZAAR) 100 MG tablet     Methylsulfonylmethane (MSM) 1000 MG CAPS     zaleplon (SONATA) 5 MG capsule     Calcium Malate POWD     trimethoprim-polymyxin b (POLYTRIM) 48395-1.1 UNIT/ML-% ophthalmic solution     No current facility-administered medications for this visit.      I have reviewed the patient's past medical, surgical, family, and social history.     OBJECTIVE:   /78   Pulse 59   Temp 97.7  F (36.5  C) (Oral)   Resp 16   Ht 1.676 m (5' 5.98\")   Wt 81 kg (178 lb 8 oz)   SpO2 94%   BMI 28.82 kg/m      Constitutional: well-appearing, appears stated age, answering questions appropriately.  Eyes: conjunctivae without erythema, sclera anicteric.   Cardiac: regular rate and rhythm, normal S1/S2, no murmur/rubs/gallops  Respiratory: lungs clear to auscultation bilaterally, normal work of breathing, no " wheezes/crackles  Skin: no rashes, lesions, or wounds  Neurologic: CN II-XII intact, no facial droop or slurring of speech.  Psych: affect is full and appropriate, speech is fluent and non-pressured    ASSESSMENT AND PLAN:     Amari was seen today for blackout and recheck.    Diagnoses and all orders for this visit:    Transient global amnesia  Comments:  Patient was out to eat with family, remembers the ride to the restraunt but the next morning couldnt recall anything thereafter.  Orders:  -     EEG EXTENDED MONITORING 41-60MN; Future  -     MRI Brain w contrast; Future  -     NEUROLOGY ADULT REFERRAL      (G45.4) Transient global amnesia  (primary encounter diagnosis)  Comment: Patient was out to eat with family, remembers the ride to the restraunt but the next morning couldnt recall anything thereafter. He has no symptoms of stroke, seizure or concussion history, he overall is in good cardiovascular health but his symptoms could still be due to TIA, though a stroke is unlikely. Alcohol induced blackout seems unlikely as he drank as much or less than his usual amount of his typical alcoholic beverages, and his wife confirmed he was not inebriated during dinner.    [facility] [place]    I was present with the medical student who participated in the service and in the documentation of the note. I have verified the history and personally performed the physical exam and medical decision making. I agree with the assessment and plan of care as documented in the note.     Jorden Bauer MD  10:35 AM, December 5, 2019    Jorden Bauer MD  [facility]  12/05/2019, 8:48 AM

## 2019-12-06 ENCOUNTER — DOCUMENTATION ONLY (OUTPATIENT)
Dept: CARE COORDINATION | Facility: CLINIC | Age: 74
End: 2019-12-06

## 2019-12-06 ASSESSMENT — ANXIETY QUESTIONNAIRES: GAD7 TOTAL SCORE: 1

## 2019-12-09 ENCOUNTER — MYC REFILL (OUTPATIENT)
Dept: FAMILY MEDICINE | Facility: CLINIC | Age: 74
End: 2019-12-09

## 2019-12-09 DIAGNOSIS — I10 ESSENTIAL HYPERTENSION WITH GOAL BLOOD PRESSURE LESS THAN 140/90: ICD-10-CM

## 2019-12-10 ENCOUNTER — PRE VISIT (OUTPATIENT)
Dept: NEUROLOGY | Facility: CLINIC | Age: 74
End: 2019-12-10

## 2019-12-10 RX ORDER — LOSARTAN POTASSIUM 100 MG/1
100 TABLET ORAL DAILY
Qty: 90 TABLET | Refills: 0 | Status: SHIPPED | OUTPATIENT
Start: 2019-12-10 | End: 2020-03-04

## 2019-12-10 NOTE — TELEPHONE ENCOUNTER
Last visit 12/5/19, future visit 1/31/20  Medication is being filled for 1 time refill only due to:  Patient needs labs BMP. Future labs ordered BMP.   Evelyn Mendez RN  [facility]

## 2019-12-10 NOTE — TELEPHONE ENCOUNTER
FUTURE VISIT INFORMATION      FUTURE VISIT INFORMATION:    Date: 1/23/2020    Time: 1pm    [place] [facility]  REFERRAL INFORMATION:    Referring provider:  Dr. Bauer     Referring providers clinic:  [facility]     Reason for visit/diagnosis  Transient Global Amnesia     RECORDS REQUESTED FROM:       Clinic name Comments Records Status Imaging Status   [facility] *All Care Everywhere records are from 2016 and before*  Care Everywhere  N/A

## 2019-12-20 ENCOUNTER — ANCILLARY PROCEDURE (OUTPATIENT)
Dept: MRI IMAGING | Facility: CLINIC | Age: 74
End: 2019-12-20
Attending: FAMILY MEDICINE
Payer: MEDICARE

## 2019-12-20 DIAGNOSIS — G45.4 TRANSIENT GLOBAL AMNESIA: ICD-10-CM

## 2019-12-20 RX ORDER — GADOBUTROL 604.72 MG/ML
7.5 INJECTION INTRAVENOUS ONCE
Status: COMPLETED | OUTPATIENT
Start: 2019-12-20 | End: 2019-12-20

## 2019-12-20 RX ADMIN — GADOBUTROL 7.5 ML: 604.72 INJECTION INTRAVENOUS at 16:24

## 2019-12-20 NOTE — DISCHARGE INSTRUCTIONS
MRI Contrast Discharge Instructions    The IV contrast you received today will pass out of your body in your  urine. This will happen in the next 24 hours. You will not feel this process.  Your urine will not change color.    Drink at least 4 extra glasses of water or juice today (unless your doctor  has restricted your fluids). This reduces the stress on your kidneys.  You may take your regular medicines.    If you are on dialysis: It is best to have dialysis today.    If you have a reaction: Most reactions happen right away. If you have  any new symptoms after leaving the hospital (such as hives or swelling),  call your hospital at the correct number below. Or call your family doctor.  If you have breathing distress or wheezing, call 911.    Special instructions: ***    I have read and understand the above information.    Signature:______________________________________ Date:___________    Staff:__________________________________________ Date:___________     Time:__________    [place] Departments:    ___[place]: [phone]  ___[facility]: [phone]  ___[place]: [phone] ___[facility]: [phone]  ___[facility]: [phone]  ___[facility]: [phone]  ___[phone]  ___[facility]: [phone]  ___[phone]

## 2020-01-07 ENCOUNTER — TELEPHONE (OUTPATIENT)
Dept: FAMILY MEDICINE | Facility: CLINIC | Age: 75
End: 2020-01-07

## 2020-01-07 NOTE — TELEPHONE ENCOUNTER
[facility] Call Center    Phone Message    May a detailed message be left on voicemail: yes    Reason for Call: Other: Pt is confused and need some clarification. Pt understands that he needs an EEG appointment but he was scheduled to see Dr. Moreno in Neurology instead. Pt wants to know if he does need to see a Neurologist and get the EEG done. Please call pt to clarify and the reason why he needs to Neurology.      Action Taken: Message routed to:  [facility] SINDY

## 2020-01-07 NOTE — TELEPHONE ENCOUNTER
"Discussed pt question with Dr Bauer - he feels pt does not need to go to neurology or do EEG, if he has not had further symptoms.   Spoke to pt - he has not had any symptoms of \"blackout\" or amnesia. He will call clinic, if he has problems again.   Appts in neurology and EEG have been cancelled.   Pt will call back prn.  Evelyn Mendez RN  [facility]  "

## 2020-01-15 DIAGNOSIS — F34.1 DYSTHYMIA: ICD-10-CM

## 2020-01-15 NOTE — TELEPHONE ENCOUNTER
Last time prescribed: 7/23/19 , 90 tabs/caps x 1 refills  Last office visit: 12/5/19  Next appointment: 1/31/2020      Prescription approved per [facility] Refill Protocol.        Laila Mccauley RN  January 15, 2020 3:03 PM

## 2020-01-17 ENCOUNTER — MYC REFILL (OUTPATIENT)
Dept: FAMILY MEDICINE | Facility: CLINIC | Age: 75
End: 2020-01-17

## 2020-01-17 DIAGNOSIS — F41.9 ANXIETY: ICD-10-CM

## 2020-01-17 RX ORDER — CLONAZEPAM 1 MG/1
1 TABLET ORAL DAILY
Qty: 30 TABLET | Refills: 0 | Status: SHIPPED | OUTPATIENT
Start: 2020-01-18 | End: 2020-02-18

## 2020-01-17 NOTE — TELEPHONE ENCOUNTER
Last seen 12/5/19, future appt 1/31/20      Last filled on 12/20/19 per ,  OK to refill with prn sig with future fill date of 1/18/20    [place] reviewed:  reviewed- no concerns    [place]:  [email or web address]    Routing refill request to provider for review/approval because:  Drug not on the [facility] refill protocol       Laila Mccauley RN  January 17, 2020 10:25 AM

## 2020-01-22 ENCOUNTER — MYC REFILL (OUTPATIENT)
Dept: FAMILY MEDICINE | Facility: CLINIC | Age: 75
End: 2020-01-22

## 2020-01-22 DIAGNOSIS — G47.00 INSOMNIA, UNSPECIFIED TYPE: ICD-10-CM

## 2020-01-23 RX ORDER — ZALEPLON 5 MG/1
15 CAPSULE ORAL AT BEDTIME
Qty: 120 CAPSULE | Refills: 0 | Status: SHIPPED | OUTPATIENT
Start: 2020-01-23 | End: 2020-04-16

## 2020-01-23 NOTE — TELEPHONE ENCOUNTER
Last Office Visit: 12/5/19  Future [facility] Appointments: none  Medication last refilled: 11/22/19    Routing refill request to provider for review/approval because: Drug not on the [facility] refill protocol     Mila Quiros RN  01/23/20  9:16 AM

## 2020-01-31 ENCOUNTER — OFFICE VISIT (OUTPATIENT)
Dept: FAMILY MEDICINE | Facility: CLINIC | Age: 75
End: 2020-01-31
Payer: MEDICARE

## 2020-01-31 VITALS
SYSTOLIC BLOOD PRESSURE: 160 MMHG | RESPIRATION RATE: 15 BRPM | OXYGEN SATURATION: 95 % | TEMPERATURE: 97.7 F | BODY MASS INDEX: 27.97 KG/M2 | WEIGHT: 174 LBS | HEART RATE: 54 BPM | HEIGHT: 66 IN | DIASTOLIC BLOOD PRESSURE: 84 MMHG

## 2020-01-31 DIAGNOSIS — R73.09 ELEVATED GLUCOSE: ICD-10-CM

## 2020-01-31 DIAGNOSIS — Z12.5 SCREENING FOR PROSTATE CANCER: ICD-10-CM

## 2020-01-31 DIAGNOSIS — Z12.11 SCREEN FOR COLON CANCER: ICD-10-CM

## 2020-01-31 DIAGNOSIS — Z00.00 MEDICARE ANNUAL WELLNESS VISIT, SUBSEQUENT: Primary | ICD-10-CM

## 2020-01-31 DIAGNOSIS — E78.5 HYPERLIPIDEMIA LDL GOAL <160: ICD-10-CM

## 2020-01-31 DIAGNOSIS — I10 ESSENTIAL HYPERTENSION WITH GOAL BLOOD PRESSURE LESS THAN 140/90: ICD-10-CM

## 2020-01-31 LAB
ALT SERPL-CCNC: 37 U/L (ref 0–70)
ANION GAP SERPL CALCULATED.3IONS-SCNC: 7 MMOL/L (ref 3–14)
AST SERPL-CCNC: 33 U/L (ref 0–55)
BUN SERPL-MCNC: 20 MG/DL (ref 7–30)
CALCIUM SERPL-MCNC: 9.2 MG/DL (ref 8.5–10.1)
CHLORIDE SERPL-SCNC: 103 MMOL/L (ref 94–109)
CHOLEST SERPL-MCNC: 193 MG/DL (ref 0–200)
CHOLEST/HDLC SERPL: 2.1 {RATIO} (ref 0–5)
CO2 SERPL-SCNC: 27 MMOL/L (ref 20–32)
CREAT SERPL-MCNC: 1.08 MG/DL (ref 0.66–1.25)
FASTING SPECIMEN: YES
GFR SERPL CREATININE-BSD FRML MDRD: 67 ML/MIN/{1.73_M2}
GLUCOSE SERPL-MCNC: 103 MG/DL (ref 70–99)
GLUCOSE SERPL-MCNC: 114 MG/DL (ref 60–99)
HBA1C MFR BLD: 5.5 % (ref 4.1–5.7)
HDLC SERPL-MCNC: 94 MG/DL
LDLC SERPL CALC-MCNC: 70 MG/DL (ref 0–129)
POTASSIUM SERPL-SCNC: 4.9 MMOL/L (ref 3.4–5.3)
PSA SERPL-ACNC: 3.22 UG/L (ref 0–4)
SODIUM SERPL-SCNC: 138 MMOL/L (ref 133–144)
TRIGL SERPL-MCNC: 149 MG/DL (ref 0–150)
VLDL-CHOLESTEROL: 30 (ref 7–32)

## 2020-01-31 ASSESSMENT — MIFFLIN-ST. JEOR: SCORE: 1467.01

## 2020-01-31 NOTE — PROGRESS NOTES
CHIEF COMPLAINT: Medicare annual wellness visit, subsequent      HISTORY OF PRESENT ILLNESS: Nakul Eddy is a 75 year old male who presents for an annual physical. Overall he is doing well.    He had an episode of transient global amnesia that occurred a couple months ago. He had a long conversation with his son and he couldn't remember even talking with him. He also forgot a conversation with his daughter. MRI of his brain was done and was normal. He is not bothered by this and understands the context.  He also acknowledges that lack of sleep, heavy alcohol consumption, and concurrent benzo use can all play a role.     He has history of dysthymia and takes fluoxetine 20 mg daily and clonazepam 1 mg daily. His PHQ 9 score is 3 today. Overall, he is not as motivated to do things like he used to. His memory has also worsened. He stills owns a successful business. 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 He reports that he drinks too much for his wife, but not too much where he is getting drunk. He has about 2-3 glasses (5 to 6 shots) of alcohol per day. His sleep is not great. He is taking Sonata 10 mg and the medication is not as effective. He is no longer exercising regularly. He thinks that exercising isn't going to keep him alive any longer. He notes that he does not want to live much longer than his mid-80s.     He has a history of arthritis that is particularly worse at the base of his right thumb and the base of his spine. He will take tylenol as needed which provides some relief in his thumbs, but not his back.       FAMILY, SOCIAL AND PAST SURGICAL HISTORIES:  Unchanged.       MEDICATIONS:  Carefully reviewed.       HEALTHCARE MAINTENANCE:  He wears glasses and eye care is up-to-date. He has an overactive tear duct and is interested in having something done about this. Hearing is fine. Dental care is up-to-date. His blood pressure is 159/86 today. Body mass index is 28.08  "kg/m  and weight is down 4 pounds since 12/2019. From an immunization standpoint, he is up-to-date besides Shingrix. Colon cancer screening with a FIT card today.     MEDICARE QUESTIONS: He has no problems with activities of daily living. No falls in the past year. His PHQ 9 score is 3 today. He is not concerned with his memory.      REVIEW OF SYSTEMS:  A 10-point review is negative.       OBJECTIVE:    GENERAL: Amari is in no distress.  Affect is upbeat.   Alert and oriented x3  VITAL SIGNS: BP (!) 159/86 (BP Location: Right arm, Patient Position: Sitting, Cuff Size: Adult Regular)   Pulse 62   Temp 97.7  F (36.5  C) (Oral)   Resp 15   Ht 1.676 m (5' 6\")   Wt 78.9 kg (174 lb)   SpO2 95%   BMI 28.08 kg/m    HEENT:  Head is normocephalic, atraumatic. Ears clear bilaterally. No pain with palpation of the frontal or maxillary sinuses.  Eyes are grossly normal.  Nose is free of any congestion or discharge.  Teeth in good repair.  Mucous membranes are moist.  Throat looks benign.  Neck is supple without adenopathy or thyromegaly.  No carotid bruits are heard, no JVD.   BACK:  Smooth and straight.  No pain to percussion.  No CVA tenderness.   LUNGS:  Clear to auscultation bilaterally.   HEART:  Regular rate and rhythm without murmur.   ABDOMEN:  Benign.     EXTREMITIES:  Ankles free of any edema.   SKIN:  Warm and dry.       LABORATORY DATA: Labs pending      ASSESSMENT AND PLAN:   1. Medicare annual wellness visit, subsequent, UTD with HCM.  2. Discussed recommendation for Shingrix vaccine when available at pharmacy.   3. Routine screening. BMP, lipid panel and PSA, pending  4. Screening for colon cancer with a FIT card.  5. History of dysthymia, PHQ-9 score of 3 today. Continue fluoxetine 20 mg daily.  6. Low back pain. Recommend he tries doing yoga and/or using his stationary bike at home.   7. Possible obstructive tear duct. Recommend seeing Dr. Curry for further evaluation.  8. H/O TGA, no further " episodes.  9. We discussed his alcohol consumption and he acknowledges that he should cut back.  10. Return to clinic in 1 year for Medicare annual wellness visit.    Call with any problems or questions.       I, Kennedy Booth, am serving as a scribe to document services personally performed by Dr. José Bo, based on data collection and the provider's statements to me. Dr. Bo has reviewed, edited, and approved the above note.     --José Bo MD

## 2020-01-31 NOTE — NURSING NOTE
"75 year old  Chief Complaint   Patient presents with     Physical       Blood pressure (!) 159/86, pulse 62, temperature 97.7  F (36.5  C), temperature source Oral, resp. rate 15, height 1.676 m (5' 6\"), weight 78.9 kg (174 lb), SpO2 95 %. Body mass index is 28.08 kg/m .  Patient Active Problem List   Diagnosis     Insomnia     Preventative health care     Dysthymia     Essential hypertension     Hyperlipidemia LDL goal <160     Transient global amnesia       Wt Readings from Last 2 Encounters:   01/31/20 78.9 kg (174 lb)   12/05/19 81 kg (178 lb 8 oz)     BP Readings from Last 3 Encounters:   01/31/20 (!) 159/86   12/05/19 136/78   02/22/19 129/89         Current Outpatient Medications   Medication     aspirin 81 MG tablet     atorvastatin (LIPITOR) 40 MG tablet     Calcium Malate POWD     clonazePAM (KLONOPIN) 1 MG tablet     ezetimibe (ZETIA) 10 MG tablet     FLUoxetine (PROZAC) 20 MG capsule     Loratadine (CLARITIN PO)     losartan (COZAAR) 100 MG tablet     Methylsulfonylmethane (MSM) 1000 MG CAPS     zaleplon (SONATA) 5 MG capsule     trimethoprim-polymyxin b (POLYTRIM) 71992-9.1 UNIT/ML-% ophthalmic solution     No current facility-administered medications for this visit.        Social History     Tobacco Use     Smoking status: Never Smoker     Smokeless tobacco: Never Used   Substance Use Topics     Alcohol use: Yes     Drug use: No       Health Maintenance Due   Topic Date Due     ADVANCE CARE PLANNING  1945     DEPRESSION ACTION PLAN  1945     ZOSTER IMMUNIZATION (1 of 2) 01/01/1995     COLONOSCOPY  12/01/2018     MEDICARE ANNUAL WELLNESS VISIT  07/10/2019       No results found for: PAP      January 31, 2020 8:07 AM    "

## 2020-02-03 ENCOUNTER — HOSPITAL ENCOUNTER (OUTPATIENT)
Facility: CLINIC | Age: 75
Setting detail: SPECIMEN
Discharge: HOME | End: 2020-02-03
Admitting: FAMILY MEDICINE
Payer: MEDICARE

## 2020-02-03 PROCEDURE — 82274 ASSAY TEST FOR BLOOD FECAL: CPT | Performed by: FAMILY MEDICINE

## 2020-02-08 DIAGNOSIS — Z12.11 SCREEN FOR COLON CANCER: ICD-10-CM

## 2020-02-08 PROBLEM — Z00.00 MEDICARE ANNUAL WELLNESS VISIT, SUBSEQUENT: Status: ACTIVE | Noted: 2020-02-08

## 2020-02-08 LAB — HEMOCCULT STL QL IA: NEGATIVE

## 2020-02-11 ASSESSMENT — PATIENT HEALTH QUESTIONNAIRE - PHQ9: SUM OF ALL RESPONSES TO PHQ QUESTIONS 1-9: 3

## 2020-02-18 DIAGNOSIS — F41.9 ANXIETY: ICD-10-CM

## 2020-02-18 RX ORDER — CLONAZEPAM 1 MG/1
1 TABLET ORAL DAILY
Qty: 30 TABLET | Refills: 0 | Status: SHIPPED | OUTPATIENT
Start: 2020-02-18 | End: 2020-03-17

## 2020-02-18 NOTE — TELEPHONE ENCOUNTER
Last visit 1/31/20  Last refill 1/17/20 - checked   - OK for refill  Evelyn Mendez RN  [facility]

## 2020-03-04 DIAGNOSIS — I10 ESSENTIAL HYPERTENSION WITH GOAL BLOOD PRESSURE LESS THAN 140/90: ICD-10-CM

## 2020-03-04 RX ORDER — LOSARTAN POTASSIUM 100 MG/1
100 TABLET ORAL DAILY
Qty: 90 TABLET | Refills: 0 | Status: SHIPPED | OUTPATIENT
Start: 2020-03-04 | End: 2020-05-29

## 2020-03-04 NOTE — TELEPHONE ENCOUNTER
Last office visit was on 1/31/2020, no future visits scheduled.    Medication is being filled for 1 time refill only due to:  Patient needs to be seen because BP recheck.       Laila Mccauley RN  March 4, 2020 4:32 PM

## 2020-04-21 ENCOUNTER — VIRTUAL VISIT (OUTPATIENT)
Dept: FAMILY MEDICINE | Facility: CLINIC | Age: 75
End: 2020-04-21
Payer: MEDICARE

## 2020-04-21 VITALS — TEMPERATURE: 97.8 F

## 2020-04-21 DIAGNOSIS — U07.1 COVID-19 VIRUS INFECTION: Primary | ICD-10-CM

## 2020-04-21 NOTE — PROGRESS NOTES
"Amari Eddy is a 75 year old male who is being evaluated via a billable telephone visit.      The patient has been notified of following:     \"This telephone visit will be conducted via a call between you and your physician/provider. We have found that certain health care needs can be provided without the need for a physical exam.  This service lets us provide the care you need with a short phone conversation.  If a prescription is necessary we can send it directly to your pharmacy.  If lab work is needed we can place an order for that and you can then stop by our lab to have the test done at a later time.    Telephone visits are billed at different rates depending on your insurance coverage. During this emergency period, for some insurers they may be billed the same as an in-person visit.  Please reach out to your insurance provider with any questions.    If during the course of the call the physician/provider feels a telephone visit is not appropriate, you will not be charged for this service.\"    Patient has given verbal consent for Telephone visit?  Yes      Subjective     TELEPHONE CONVERSATION      SUBJECTIVE:  Nakul is a 75-year-old who was diagnosed with COVID-19 on 04/10.  At that point, he [place] [facility] [place] both he and his wife (who had been symptomatic) were positive.  At no point has Nakul exhibited any symptoms of infection including fever, cough, chest tightness, loss of sense of smell, etc.  He and his wife had simultaneous phone call visits with me, but Nakul's questions were really more related to how he can interact with others, whereas his wife's symptoms were really ongoing.      Given that Nakul never had any symptoms, he wonders when he might be immune.  He had the diagnosis approximately 10 days ago.  He and his wife have been practicing isolation/quarantine.  They really have not seen anyone else.  Nakul runs a large business and would like to potentially have some " face-to-face visits coming up.  He knows that he should not be doing that at this point.  If he does have any kind of interaction with family members, wearing a mask would be appropriate as well as observing social distancing guidelines.  He fully intends to do that.      From a review of systems standpoint, he has none of the classic symptoms such as cough, shortness of breath, chest tightness, fever, loss of sense of smell or any of those symptoms.      ACTIVE MEDICAL PROBLEMS AND CURRENT MEDICATIONS:  Reviewed.      OBJECTIVE:  This is a telephone call.  Nakul had very good upbeat affect.  Speech is of normal rate and quality.  Excellent insight and judgment.  No dyspnea or coughing detected over the phone.      ASSESSMENT AND PLAN:   1.  COVID-19 exposure/infection, laboratory proven at [facility] on 04/10.  His wife was clearly the source as she was and is still dealing symptoms.   2.  Given that Nakul has never been ill, he should simply practice normal distancing guidelines and wear a mask when he is in public.  At some point, he would really like to get antibody testing and offer himself up in any way that he can from a study standpoint.  This is, of course, very appreciated and we will put him in any studies that he might qualify for.  At some point, it would be good to check antibody status.      Total time spent with Nakul was less than 10 minutes.

## 2020-04-21 NOTE — NURSING NOTE
75 year old  Chief Complaint   Patient presents with     Fatigue     was diagnosed with COVID with no other symptoms       Temperature 97.8  F (36.6  C), temperature source Oral. There is no height or weight on file to calculate BMI.  Patient Active Problem List   Diagnosis     Insomnia     Preventative health care     Dysthymia     Essential hypertension     Hyperlipidemia LDL goal <160     Transient global amnesia     Medicare annual wellness visit, subsequent       Wt Readings from Last 2 Encounters:   01/31/20 78.9 kg (174 lb)   12/05/19 81 kg (178 lb 8 oz)     BP Readings from Last 3 Encounters:   01/31/20 (!) 160/84   12/05/19 136/78   02/22/19 129/89         Current Outpatient Medications   Medication     aspirin 81 MG tablet     atorvastatin (LIPITOR) 40 MG tablet     Calcium Malate POWD     clonazePAM (KLONOPIN) 1 MG tablet     ezetimibe (ZETIA) 10 MG tablet     FLUoxetine (PROZAC) 20 MG capsule     Loratadine (CLARITIN PO)     losartan (COZAAR) 100 MG tablet     Methylsulfonylmethane (MSM) 1000 MG CAPS     zaleplon (SONATA) 5 MG capsule     trimethoprim-polymyxin b (POLYTRIM) 05597-5.1 UNIT/ML-% ophthalmic solution     No current facility-administered medications for this visit.        Social History     Tobacco Use     Smoking status: Never Smoker     Smokeless tobacco: Never Used   Substance Use Topics     Alcohol use: Yes     Drug use: No       Health Maintenance Due   Topic Date Due     ADVANCE CARE PLANNING  1945     DEPRESSION ACTION PLAN  1945     ZOSTER IMMUNIZATION (1 of 2) 01/01/1995     PHQ-9  04/30/2020       No results found for: PAP      April 21, 2020 2:22 PM

## 2020-05-27 ENCOUNTER — TELEPHONE (OUTPATIENT)
Dept: FAMILY MEDICINE | Facility: CLINIC | Age: 75
End: 2020-05-27

## 2020-05-27 DIAGNOSIS — I10 ESSENTIAL HYPERTENSION WITH GOAL BLOOD PRESSURE LESS THAN 140/90: ICD-10-CM

## 2020-05-27 NOTE — TELEPHONE ENCOUNTER
Last office visit was on 01/31/2020, no future visits scheduled.    Prescription approved per [facility] Refill Protocol.  Evelyn Mendez RN  [facility]

## 2020-05-27 NOTE — TELEPHONE ENCOUNTER
Health Call Center    Phone Message    May a detailed message be left on voicemail: yes     Reason for Call: Medication Question or concern regarding medication   Prescription Clarification  Name of Medication: aspirin 81 MG tablet   Prescribing Provider: Yareli Enriquez   Question: Amari has stopped taking his aspirin 81 MG tablet. Amari was told when he was giving blood that he should check in with his care team regarding that medication. Amari was hoping to speak with Evelyn. Please give Amari a call back at your earliest convenience.    Action Taken: Message routed to:  [place] [facility]: Primary Care    Travel Screening: Not Applicable

## 2020-05-29 RX ORDER — LOSARTAN POTASSIUM 100 MG/1
100 TABLET ORAL DAILY
Qty: 90 TABLET | Refills: 1 | Status: SHIPPED | OUTPATIENT
Start: 2020-05-29 | End: 2020-12-18

## 2020-06-02 NOTE — TELEPHONE ENCOUNTER
Spoke to pt - states he is donating platelets ,as he was covid positive - donates platelets every month. He is told to hold aspirin for 3 days prior to donation - he is wondering if he should just stop daily aspirin 81 mg, as he has been taking this for years. Advised he could continue taking aspirin , and just hold on days needed to continue donations.   He agrees with plan - he will call back prn.  Evelyn Mendez RN  [facility]

## 2020-07-13 DIAGNOSIS — F34.1 DYSTHYMIA: ICD-10-CM

## 2020-07-13 NOTE — TELEPHONE ENCOUNTER
Last time prescribed: 1/15/2020 , 90 caps x 1 refills  Last office visit: 4/21/2020  Next appointment: No future appointments    Prescription approved per [facility] Refill Protocol.  Evelyn Mendez RN  [facility]

## 2020-07-23 DIAGNOSIS — E78.5 HYPERLIPIDEMIA LDL GOAL <70: ICD-10-CM

## 2020-07-23 NOTE — TELEPHONE ENCOUNTER
Last time prescribed: 7/30/19 , 135 tabs x 3 refills  Last office visit: 4/21/2020  Next appointment: No future appointments    Prescription approved per [facility] Refill Protocol.        Laila Mccauley RN  July 24, 2020 1:33 PM

## 2020-07-24 RX ORDER — ATORVASTATIN CALCIUM 40 MG/1
TABLET, FILM COATED ORAL
Qty: 135 TABLET | Refills: 1 | Status: SHIPPED | OUTPATIENT
Start: 2020-07-24 | End: 2021-01-14

## 2020-08-07 DIAGNOSIS — F41.9 ANXIETY: ICD-10-CM

## 2020-08-07 DIAGNOSIS — G47.00 INSOMNIA, UNSPECIFIED TYPE: ICD-10-CM

## 2020-08-07 RX ORDER — ZALEPLON 5 MG/1
15 CAPSULE ORAL AT BEDTIME
Qty: 120 CAPSULE | Refills: 0 | Status: SHIPPED | OUTPATIENT
Start: 2020-08-07 | End: 2020-10-14

## 2020-08-07 RX ORDER — CLONAZEPAM 1 MG/1
1 TABLET ORAL DAILY
Qty: 30 TABLET | Refills: 0 | Status: SHIPPED | OUTPATIENT
Start: 2020-08-11 | End: 2020-09-18

## 2020-08-07 NOTE — TELEPHONE ENCOUNTER
[facility] Call [facility]    Phone Message    May a detailed message be left on voicemail: yes     Reason for Call: Medication Refill Request    Has the patient contacted the pharmacy for the refill? Yes   Name of medication being requested: zaleplon, clonazepam  Provider who prescribed the medication: Betzy  Pharmacy: [facility] [id number] [place], [place] - [facility] [address] [facility]   Date medication is needed: ASAP, pt leaving town 08/12/20, needs refills before then as he will be gone for two weeks         Action Taken: Message routed to:  [place] [facility]: Nurses    Travel Screening: Not Applicable

## 2020-08-07 NOTE — TELEPHONE ENCOUNTER
Refills completed; OK per Dr Bo - sent [employer] message to pt re: refills.  Evelyn Mendez RN  [facility]

## 2020-08-07 NOTE — TELEPHONE ENCOUNTER
Last visit 4/21/20  Last refill zaleplon 6/19/20 - OK for refill - checked   Last refill clonazepam 7/20/20 - checked  - OK for refill 8/11 per Dr Bo.   Will notify pt via [email or web address].   Evelyn Mendez RN  [facility]

## 2020-08-10 ENCOUNTER — TELEPHONE (OUTPATIENT)
Dept: FAMILY MEDICINE | Facility: CLINIC | Age: 75
End: 2020-08-10

## 2020-08-10 NOTE — TELEPHONE ENCOUNTER
Spoke to pt - he reports there was some confusion at the pharmacy, re; pt's request for early refill of clonazepam.Script sent to pharmacy last week with refill date of 8/11, approved per Dr Bo.   Spoke to pharmacist - clarified that refill could be done 8/11, a few days early, as pt is going [place] town.    Pharmacy will fill med 8/11; pt notified.  Evelyn Mendez RN  [facility]   Subjective:       Patient ID: Teresue Boyle is a 64 y.o. female.    Chief Complaint:  Well Woman and Vaginal Irritation      History of Present Illness  HPI  here for problem   C/o vaginal discharge with itching; denies vaginal odor; used monistat  About 2 wks ago--used 6 doses  Also needs mammogram orders  Current on colonoscopy      GYN & OB History  No LMP recorded. Patient has had a hysterectomy.   Date of Last Pap: No result found    OB History   No data available       Review of Systems  Review of Systems   Constitutional: Negative for activity change, appetite change, chills, diaphoresis, fatigue, fever and unexpected weight change.   HENT: Negative for mouth sores and tinnitus.    Eyes: Negative for discharge and visual disturbance.   Respiratory: Negative for cough, shortness of breath and wheezing.    Cardiovascular: Negative for chest pain, palpitations and leg swelling.   Gastrointestinal: Negative for abdominal pain, bloating, blood in stool, constipation, diarrhea, nausea and vomiting.   Endocrine: Negative for diabetes, hair loss, hot flashes, hyperthyroidism and hypothyroidism.   Genitourinary: Positive for vaginal discharge. Negative for decreased libido, dyspareunia, dysuria, flank pain, frequency, genital sores, hematuria, menorrhagia, menstrual problem, pelvic pain, urgency, vaginal bleeding, vaginal pain, dysmenorrhea, urinary incontinence, postcoital bleeding, postmenopausal bleeding and vaginal odor.   Musculoskeletal: Negative for back pain and myalgias.   Skin:  Negative for rash, no acne and hair changes.   Neurological: Negative for seizures, syncope, numbness and headaches.   Hematological: Negative for adenopathy. Does not bruise/bleed easily.   Psychiatric/Behavioral: Negative for depression and sleep disturbance. The patient is not nervous/anxious.    Breast: Negative for breast mass, breast pain, nipple discharge and skin changes          Objective:    Physical Exam:    Constitutional: She appears well-developed.     Eyes: Conjunctivae and EOM are normal. Pupils are equal, round, and reactive to light.    Neck: Normal range of motion. Neck supple.     Pulmonary/Chest: Effort normal. Right breast exhibits no mass, no nipple discharge, no skin change and no tenderness. Left breast exhibits no mass, no nipple discharge, no skin change and no tenderness. Breasts are symmetrical.        Abdominal: Soft.     Genitourinary: Rectum normal. Pelvic exam was performed with patient supine. Uterus is absent. Right adnexum displays no mass and no tenderness. Left adnexum displays no mass and no tenderness. No erythema, bleeding, rectocele, cystocele or unspecified prolapse of vaginal walls in the vagina. Vaginal discharge found. Labial bartholins normal.Cervix exhibits absence.           Musculoskeletal: Normal range of motion.       Neurological: She is alert.    Skin: Skin is warm.    Psychiatric: She has a normal mood and affect.          Assessment:        1. Vaginal irritation    2. Screening mammogram, encounter for               Plan:      Suspect yeast  rx sent for diflucan  Mammogram ordered

## 2020-08-10 NOTE — TELEPHONE ENCOUNTER
[facility] Call Center    Phone Message    May a detailed message be left on voicemail: yes     Reason for Call: Medication Question or concern regarding medication   Prescription Clarification  Name of Medication: clonazePAM (KLONOPIN) 1 MG tablet   Prescribing Provider:    Pharmacy: [facility] [id number] [facility] - [place], [place] - [address]    What on the order needs clarification? Pt was under the impression that the Doctor wrote an early refill request to [facility], his med still isn't ready, he thought it was supposed to be ready today, please call bri calixto          Action Taken: Message routed to:  [facility] nurse    Travel Screening: Not Applicable

## 2020-09-18 ENCOUNTER — MYC REFILL (OUTPATIENT)
Dept: FAMILY MEDICINE | Facility: CLINIC | Age: 75
End: 2020-09-18

## 2020-09-18 DIAGNOSIS — F41.9 ANXIETY: ICD-10-CM

## 2020-09-18 RX ORDER — CLONAZEPAM 1 MG/1
1 TABLET ORAL DAILY
Qty: 30 TABLET | Refills: 0 | Status: SHIPPED | OUTPATIENT
Start: 2020-09-18 | End: 2020-10-14

## 2020-09-18 NOTE — TELEPHONE ENCOUNTER
Last seen 4/21/20 virtual, no future appt     Last filled for # 30 on 8/11/20 per     On time for prn refill     [place] reviewed:  reviewed- no concerns    [place]  [email or web address]    Routing refill request to provider for review/approval because:  Drug not on the [facility] refill protocol     Laila Mccauley RN  September 18, 2020 10:39 AM

## 2020-10-14 ENCOUNTER — MYC REFILL (OUTPATIENT)
Dept: FAMILY MEDICINE | Facility: CLINIC | Age: 75
End: 2020-10-14

## 2020-10-14 DIAGNOSIS — F41.9 ANXIETY: ICD-10-CM

## 2020-10-14 DIAGNOSIS — G47.00 INSOMNIA, UNSPECIFIED TYPE: ICD-10-CM

## 2020-10-15 RX ORDER — ZALEPLON 5 MG/1
15 CAPSULE ORAL AT BEDTIME
Qty: 120 CAPSULE | Refills: 0 | Status: SHIPPED | OUTPATIENT
Start: 2020-10-15 | End: 2020-12-08

## 2020-10-15 RX ORDER — CLONAZEPAM 1 MG/1
1 TABLET ORAL DAILY
Qty: 30 TABLET | Refills: 0 | Status: SHIPPED | OUTPATIENT
Start: 2020-10-18 | End: 2021-05-26

## 2020-10-15 NOTE — TELEPHONE ENCOUNTER
Agreed to refill of zaleplon at elevated dose as per previously written prescriptions from patient's PCP, Dr. Bo. Agreed to one time refill of prescription of medications during Dr. Bo's absence.     Amari was seen today for refill request.    Diagnoses and all orders for this visit:    Insomnia, unspecified type  -     zaleplon (SONATA) 5 MG capsule; Take 3 capsules (15 mg) by mouth At Bedtime May repeat with one capsule ( 5 mg) during night  if needed.  May only fill once every 30 days    Anxiety  -     clonazePAM (KLONOPIN) 1 MG tablet; Take 1 tablet (1 mg) by mouth daily as needed.      Jorden Bauer MD  12:50 PM, October 15, 2020

## 2020-10-15 NOTE — TELEPHONE ENCOUNTER
Last visit 4/21/20  Last refill of sonata 8/7/20  Last refill of clonazepam 9/18/20 - OK for refill 10/18/20  Checked  for both  Evelyn Mendez RN  [facility]

## 2020-11-21 ENCOUNTER — MYC REFILL (OUTPATIENT)
Dept: FAMILY MEDICINE | Facility: CLINIC | Age: 75
End: 2020-11-21

## 2020-11-21 DIAGNOSIS — F41.9 ANXIETY: ICD-10-CM

## 2020-11-21 RX ORDER — CLONAZEPAM 1 MG/1
1 TABLET ORAL DAILY
Qty: 30 TABLET | Refills: 0 | Status: CANCELLED | OUTPATIENT
Start: 2020-11-21

## 2020-11-24 ENCOUNTER — TELEPHONE (OUTPATIENT)
Dept: FAMILY MEDICINE | Facility: CLINIC | Age: 75
End: 2020-11-24

## 2020-11-24 NOTE — TELEPHONE ENCOUNTER
[facility] Call Center    Phone Message    May a detailed message be left on voicemail: yes     Reason for Call: Other: Patient calling to state that he stopped taking his clonazapam 3 days ago and would like a return call to discuss side effects of stopping if there are any. Please call to discuss thank you.      Action Taken: Message routed to:  [place] [facility]: [place]    Travel Screening: Not Applicable

## 2020-11-24 NOTE — TELEPHONE ENCOUNTER
See Telephone Encounter 11/24/20 - patient no longer needs this medication.    Mila Quiros RN  11/24/20  1:42 PM

## 2020-11-24 NOTE — TELEPHONE ENCOUNTER
"I called patient, he states he is in [place] now and stopped taking clonazepam 3 days ago due to running out. He is \"feeling fine\". He would like to try and go without it now, but agrees to call if he feels he needs it again. All questions answered.     Mila Quiros RN  11/24/20  1:41 PM      "

## 2020-12-09 ENCOUNTER — TELEPHONE (OUTPATIENT)
Dept: FAMILY MEDICINE | Facility: CLINIC | Age: 75
End: 2020-12-09

## 2020-12-09 NOTE — TELEPHONE ENCOUNTER
Central Prior Authorization Team   [phone]    PA Initiation    Medication: zaleplon 5 mg capsule  Insurance Company: [employer] - Phone [phone] Fax [phone]  Pharmacy Filling the Rx: [id number] [place], [place] [facility] [address] [facility]  Filling Pharmacy Phone: [phone]  Filling Pharmacy Fax: [phone]  Start Date: 12/9/2020

## 2020-12-09 NOTE — TELEPHONE ENCOUNTER
Prior Authorization Retail Medication Request    Medication/Dose: ZALEPLON 5 MG CAP  ICD code (if different than what is on RX):    Previously Tried and Failed:    Rationale:      Insurance Name:    Insurance ID:        Pharmacy Information (if different than what is on RX)  Name:    Phone:

## 2020-12-10 NOTE — TELEPHONE ENCOUNTER
Prior Authorization Approval    Authorization Effective Date: 9/10/2020  Authorization Expiration Date: 12/9/2021  Medication: zaleplon 5 mg capsule-PA APPROVED   Approved Dose/Quantity:   Reference #:     Insurance Company: [employer] - Phone [phone] Fax [phone]  Expected CoPay:       CoPay Card Available:      Foundation Assistance Needed:    Which Pharmacy is filling the prescription (Not needed for infusion/clinic administered): [id number] [place] [place] - [address]  Pharmacy Notified: Yes- **Instructed pharmacy to notify patient when script is ready to /ship.**  Patient Notified: Yes

## 2020-12-18 DIAGNOSIS — I10 ESSENTIAL HYPERTENSION WITH GOAL BLOOD PRESSURE LESS THAN 140/90: ICD-10-CM

## 2020-12-18 RX ORDER — LOSARTAN POTASSIUM 100 MG/1
100 TABLET ORAL DAILY
Qty: 90 TABLET | Refills: 1 | Status: SHIPPED | OUTPATIENT
Start: 2020-12-18 | End: 2021-06-14

## 2020-12-18 NOTE — TELEPHONE ENCOUNTER
Last visit 4/21/20, no future visit  Prescription approved per [facility] Refill Protocol.  Evelyn Mendez RN  [facility]

## 2021-01-12 DIAGNOSIS — E78.5 HYPERLIPIDEMIA LDL GOAL <70: ICD-10-CM

## 2021-01-12 NOTE — TELEPHONE ENCOUNTER
Last time prescribed: 7/24/20 , 135 tabs/caps x 1 refills  Last office visit: 4/21/20  Next appointment: No Future Appointment Scheduled    Prescription approved per [facility] [place]ill Protocol.      Laila Mccauley RN  January 14, 2021 1:39 PM

## 2021-01-14 ENCOUNTER — HEALTH MAINTENANCE LETTER (OUTPATIENT)
Age: 76
End: 2021-01-14

## 2021-01-14 RX ORDER — ATORVASTATIN CALCIUM 40 MG/1
TABLET, FILM COATED ORAL
Qty: 135 TABLET | Refills: 0 | Status: SHIPPED | OUTPATIENT
Start: 2021-01-14 | End: 2021-05-07

## 2021-01-29 ENCOUNTER — MYC MEDICAL ADVICE (OUTPATIENT)
Dept: FAMILY MEDICINE | Facility: CLINIC | Age: 76
End: 2021-01-29

## 2021-02-03 ENCOUNTER — TELEPHONE (OUTPATIENT)
Dept: FAMILY MEDICINE | Facility: CLINIC | Age: 76
End: 2021-02-03

## 2021-02-03 NOTE — TELEPHONE ENCOUNTER
Prior Authorization Retail Medication Request    Medication/Dose: atorvastatin 40 mg  ICD code (if different than what is on RX):  same  Previously Tried and Failed:    Rationale:      Insurance Name:  same  Insurance ID:  same      Pharmacy Information (if different than what is on RX)  Name:  [facility]  Phone:  [phone]  Evelyn Mendez RN  [facility]

## 2021-02-04 NOTE — TELEPHONE ENCOUNTER
PA Initiation    Medication: atorvastatin 40 mg -   Insurance Company: [employer] - Phone [phone] Fax [phone]  Pharmacy Filling the Rx: [id number] - [place], [place] - [address]  Filling Pharmacy Phone: [phone]  Filling Pharmacy Fax: [phone]  Start Date: 2/4/2021

## 2021-02-08 NOTE — TELEPHONE ENCOUNTER
Prior Authorization Approval    Medication: atorvastatin 40 mg - APPROVED was approved on 2/5/2021  Effective: 11/6/2020 to 2/4/2022  Reference #:    Approved Dose/Quantity:   Insurance Company: [employer] - Phone [phone] Fax [phone]  Expected CoPay:    Ph[facility]illing the Rx[id number] - [place], [place] - [address]  Pharmacy Notified: Yes  Patient Notified: Comment:  **Instructed pharmacy to notify patient when script is ready to /ship.**

## 2021-02-15 ENCOUNTER — MYC REFILL (OUTPATIENT)
Dept: FAMILY MEDICINE | Facility: CLINIC | Age: 76
End: 2021-02-15

## 2021-02-15 DIAGNOSIS — G47.00 INSOMNIA, UNSPECIFIED TYPE: ICD-10-CM

## 2021-02-15 RX ORDER — ZALEPLON 5 MG/1
15 CAPSULE ORAL AT BEDTIME
Qty: 120 CAPSULE | Refills: 0 | Status: SHIPPED | OUTPATIENT
Start: 2021-02-15 | End: 2021-05-14

## 2021-03-14 ENCOUNTER — HEALTH MAINTENANCE LETTER (OUTPATIENT)
Age: 76
End: 2021-03-14

## 2021-04-07 DIAGNOSIS — F34.1 DYSTHYMIA: ICD-10-CM

## 2021-04-07 NOTE — TELEPHONE ENCOUNTER
Last visit was virtually on 04/21/2020, next visit is scheduled for 06/14/2021 with Dr. Bo.    PHQ-9 SCORE 12/5/2019 1/31/2020   PHQ-9 Total Score 2 3   EMMANUELLE-7 SCORE 12/5/2019    Total Score 1      Medication is being filled for 1 time refill only due to:  Patient needs to be seen because needs updated mental health assessments - keep upcoming appointment.   Mila Quiros RN  04/07/21  3:25 PM

## 2021-05-07 DIAGNOSIS — E78.5 HYPERLIPIDEMIA LDL GOAL <70: ICD-10-CM

## 2021-05-07 RX ORDER — ATORVASTATIN CALCIUM 40 MG/1
TABLET, FILM COATED ORAL
Qty: 135 TABLET | Refills: 0 | Status: SHIPPED | OUTPATIENT
Start: 2021-05-07 | End: 2021-06-14

## 2021-05-07 NOTE — TELEPHONE ENCOUNTER
Last visit was virtually on 04/21/2020, next visit is scheduled for 06/14/2021 with Dr. Bo.    Routing refill request to provider for review/approval because:  Desiree given x1 and patient did not follow up, please advise  Labs not current:  Lipid panel, order not placed as needs to see MD too  Patient needs to be seen because it has been more than 1 year since last office visit.    Pt has future visit in June       Laila Mccauley RN  May 7, 2021 3:55 PM

## 2021-05-14 ENCOUNTER — MYC REFILL (OUTPATIENT)
Dept: FAMILY MEDICINE | Facility: CLINIC | Age: 76
End: 2021-05-14

## 2021-05-14 DIAGNOSIS — G47.00 INSOMNIA, UNSPECIFIED TYPE: ICD-10-CM

## 2021-05-17 DIAGNOSIS — F34.1 DYSTHYMIA: ICD-10-CM

## 2021-05-17 DIAGNOSIS — I10 ESSENTIAL HYPERTENSION WITH GOAL BLOOD PRESSURE LESS THAN 140/90: ICD-10-CM

## 2021-05-17 RX ORDER — ZALEPLON 5 MG/1
15 CAPSULE ORAL AT BEDTIME
Qty: 120 CAPSULE | Refills: 0 | Status: SHIPPED | OUTPATIENT
Start: 2021-05-17 | End: 2021-07-06

## 2021-05-17 RX ORDER — LOSARTAN POTASSIUM 100 MG/1
100 TABLET ORAL DAILY
Qty: 90 TABLET | Refills: 1 | Status: CANCELLED | OUTPATIENT
Start: 2021-05-17

## 2021-05-17 NOTE — TELEPHONE ENCOUNTER
Last Office Visit: 4/21/20  [facility] [place] Appointments: 6/14/21 Physical with Betzy   Medication last refilled: 12/20/20 for # 120, with 0 refills       Note , pt using much less than prn prescribed as in the past   ( 4 per night)  Which is good. .  MD notified.  He is OK with still giving him the same amount, just lasting longer now.     [place] reviewed:  reviewed- no concerns    [place]  [email or web address]    Routing refill request to provider for review/approval because:  Drug not on the [facility] refill protocol       Laila Mccauley RN  May 17, 2021 9:18 AM

## 2021-05-17 NOTE — TELEPHONE ENCOUNTER
Last visit was virtually on 04/21/2020,  6/14/21 future visit scheduled.--physical with Betzy     Pt should have enough of both of these meds until physical appt with MD mid June.  [facility] from [facility]ill hx he should have enough pills till appt .     I called pt and LVM as to this  If incorrect and he needs them he should call us back     Laila Mccauley RN  May 18, 2021 9:39 AM    Nakul called back, and he said if we do not hear back from him  He does not need the refills before the appt.  He is checking with his wife who sets up his meds.       Laila Mccauley RN  May 18, 2021 2:09 PM      Pt called back to confirm he does not need these 2 refills, on the prozac and cozaar.  These 2 requests then removed.      Laila Mccauley RN  May 19, 2021 9:08 AM

## 2021-05-26 ENCOUNTER — OFFICE VISIT (OUTPATIENT)
Dept: FAMILY MEDICINE | Facility: CLINIC | Age: 76
End: 2021-05-26
Payer: MEDICARE

## 2021-05-26 VITALS
BODY MASS INDEX: 27.31 KG/M2 | TEMPERATURE: 97.2 F | OXYGEN SATURATION: 94 % | WEIGHT: 174 LBS | HEART RATE: 67 BPM | DIASTOLIC BLOOD PRESSURE: 96 MMHG | HEIGHT: 67 IN | SYSTOLIC BLOOD PRESSURE: 157 MMHG

## 2021-05-26 DIAGNOSIS — M77.11 LATERAL EPICONDYLITIS OF RIGHT ELBOW: Primary | ICD-10-CM

## 2021-05-26 ASSESSMENT — PAIN SCALES - GENERAL: PAINLEVEL: MODERATE PAIN (4)

## 2021-05-26 ASSESSMENT — MIFFLIN-ST. JEOR: SCORE: 1477.89

## 2021-05-26 NOTE — NURSING NOTE
"76 year old  Chief Complaint   Patient presents with     Musculoskeletal Problem     right elbow pain x 2 mons        Blood pressure (!) 157/96, pulse 67, temperature 97.2  F (36.2  C), temperature source Temporal, height 1.702 m (5' 7\"), weight 78.9 kg (174 lb), SpO2 94 %. Body mass index is 27.25 kg/m .  Patient Active Problem List   Diagnosis     Insomnia     Preventative health care     Dysthymia     Essential hypertension     Hyperlipidemia LDL goal <160     Transient global amnesia     Medicare annual wellness visit, subsequent       Wt Readings from Last 2 Encounters:   05/26/21 78.9 kg (174 lb)   01/31/20 78.9 kg (174 lb)     BP Readings from Last 3 Encounters:   05/26/21 (!) 157/96   01/31/20 (!) 160/84   12/05/19 136/78         Current Outpatient Medications   Medication     aspirin 81 MG tablet     atorvastatin (LIPITOR) 40 MG tablet     Calcium Malate POWD     ezetimibe (ZETIA) 10 MG tablet     FLUoxetine (PROZAC) 20 MG capsule     Loratadine (CLARITIN PO)     losartan (COZAAR) 100 MG tablet     Methylsulfonylmethane (MSM) 1000 MG CAPS     zaleplon (SONATA) 5 MG capsule     No current facility-administered medications for this visit.        Social History     Tobacco Use     Smoking status: Never Smoker     Smokeless tobacco: Never Used   Substance Use Topics     Alcohol use: Yes     Drug use: No       Health Maintenance Due   Topic Date Due     ADVANCE CARE PLANNING  Never done     DEPRESSION ACTION PLAN  Never done     COVID-19 Vaccine (1) Never done     ZOSTER IMMUNIZATION (1 of 2) Never done     PHQ-9  04/30/2020     MEDICARE ANNUAL WELLNESS VISIT  01/31/2021     FALL RISK ASSESSMENT  01/31/2021     COLORECTAL CANCER SCREENING  02/03/2021       No results found for: PAP      May 26, 2021 3:39 PM  "

## 2021-05-26 NOTE — PATIENT INSTRUCTIONS
"  ASSESSMENT/PLAN:      1. RIGHT elbow pain over lateral epicondyle   - Discussed that this is likely lateral epicondylitis (\"tennis elbow\"). Also, mentioned that a fracture of the proximal radius can cause similar symptoms. Amari elected to defer X-rays and see how it goes.   Referred to Hand therapy. If no improvement, then let us know.   Also, discussed some strategies to decrease pain, e.g. lifting with palm up.     2. Elevated Blood pressure today. He states that this is due to the Oral surgery earlier today  -Asked to check blood pressure at home.   - Return if numbers above 135/85.     --Neftaly Ferrera MD  "

## 2021-05-26 NOTE — PROGRESS NOTES
"OVERVIEW: Amari Eddy is a 76 year old male who presents to [facility] today for Musculoskeletal Problem (right elbow pain x 2 mons )        ASSESSMENT/PLAN:      1. RIGHT elbow pain over lateral epicondyle   - Discussed that this is likely lateral epicondylitis (\"tennis elbow\"). Also, mentioned that a fracture of the proximal radius can cause similar symptoms. Amari elected to defer X-rays and see how it goes.   Referred to Hand therapy. If no improvement, then let us know.   Also, discussed some strategies to decrease pain, e.g. lifting with palm up.     2. Elevated Blood pressure today. He states that this is due to the Oral surgery earlier today  -Asked to check blood pressure at home.   - Return if numbers above 135/85.     --Neftaly Ferrera MD      SUBJECTIVE:   About 2- 3 months of RIGHT elbow pain. No injury.   Pain with extreme ROM, e.g. end of extension    Review Of Systems:  Earlier today had Oral surgery due to \"bad teeth\" from smoking.  States that this is painful.  He felt the procedure was much more stressful than he was prepared for.    Had COVID in March 2020. Had a fairly asymptomatic case.   Then had vaccinations about 2 months ago.     Has otherwise been in usual state of health    Problem list per EMR:  Patient Active Problem List   Diagnosis     Insomnia     Preventative health care     Dysthymia     Essential hypertension     Hyperlipidemia LDL goal <160     Transient global amnesia     Medicare annual wellness visit, subsequent       Current Outpatient Medications   Medication Sig Dispense Refill     aspirin 81 MG tablet Take 1 tablet by mouth daily.       atorvastatin (LIPITOR) 40 MG tablet TAKE 1.5 TABLETS BY MOUTH ONCE DAILY. 135 tablet 0     Calcium Malate POWD 500-1,000 mg daily.       ezetimibe (ZETIA) 10 MG tablet Take 1 tablet by mouth daily.       FLUoxetine (PROZAC) 20 MG capsule Take 1 capsule (20 mg) by mouth daily 90 capsule 0     Loratadine (CLARITIN PO) Take by " "mouth as needed       losartan (COZAAR) 100 MG tablet Take 1 tablet (100 mg) by mouth daily 90 tablet 1     Methylsulfonylmethane (MSM) 1000 MG CAPS Take 2,000 mg by mouth daily.        zaleplon (SONATA) 5 MG capsule Take 3 capsules (15 mg) by mouth At Bedtime May repeat with one capsule ( 5 mg) during night  if needed.  May only fill once every 30 days 120 capsule 0       No Known Allergies     Social:   Retired .       OBJECTIVE    Vitals: BP (!) 157/96 (BP Location: Right arm, Patient Position: Sitting, Cuff Size: Adult Large)   Pulse 67   Temp 97.2  F (36.2  C) (Temporal)   Ht 1.702 m (5' 7\")   Wt 78.9 kg (174 lb)   SpO2 94%   BMI 27.25 kg/m    BMI= Body mass index is 27.25 kg/m .  Very pleasant 76-year-old who appears in no distress but is reporting some mouth pain.  Of note we are all wearing masks as the coronavirus pandemic is still present in the area.     Right elbow has no redness warmth or effusion.  He is tender over the lateral epicondyles and the proximal radius area.  Able to get to full extension, full flexion, full pronation and full supination but with discomfort when getting to extreme of these ranges of motion.  This is most noteworthy with full extension.  He also has discomfort with wrist dorsiflexion that is resisted.  He has normal sensation and pulses in his hands.  Full range of motion of his shoulder and neck.    SEE TOP OF NOTE FOR ASSESSMENT AND PLAN    --Neftaly Ferrera MD  [facility], Department of Family Medicine and Community Health  "

## 2021-06-14 ENCOUNTER — OFFICE VISIT (OUTPATIENT)
Dept: FAMILY MEDICINE | Facility: CLINIC | Age: 76
End: 2021-06-14
Payer: MEDICARE

## 2021-06-14 VITALS
HEART RATE: 51 BPM | BODY MASS INDEX: 27.31 KG/M2 | DIASTOLIC BLOOD PRESSURE: 78 MMHG | RESPIRATION RATE: 16 BRPM | TEMPERATURE: 97.5 F | WEIGHT: 174 LBS | OXYGEN SATURATION: 95 % | SYSTOLIC BLOOD PRESSURE: 160 MMHG | HEIGHT: 67 IN

## 2021-06-14 DIAGNOSIS — Z00.00 MEDICARE ANNUAL WELLNESS VISIT, SUBSEQUENT: Primary | ICD-10-CM

## 2021-06-14 DIAGNOSIS — N32.81 OVERACTIVE BLADDER: ICD-10-CM

## 2021-06-14 DIAGNOSIS — R73.09 ELEVATED GLUCOSE: ICD-10-CM

## 2021-06-14 DIAGNOSIS — F34.1 DYSTHYMIA: ICD-10-CM

## 2021-06-14 DIAGNOSIS — E78.5 HYPERLIPIDEMIA LDL GOAL <70: ICD-10-CM

## 2021-06-14 DIAGNOSIS — J30.2 SEASONAL ALLERGIC RHINITIS, UNSPECIFIED TRIGGER: ICD-10-CM

## 2021-06-14 DIAGNOSIS — Z12.5 SCREENING FOR PROSTATE CANCER: ICD-10-CM

## 2021-06-14 DIAGNOSIS — Z12.11 SCREENING FOR COLON CANCER: ICD-10-CM

## 2021-06-14 DIAGNOSIS — I10 ESSENTIAL HYPERTENSION: ICD-10-CM

## 2021-06-14 LAB
ALT SERPL-CCNC: 31 U/L (ref 0–70)
AST SERPL-CCNC: 31 U/L (ref 0–55)
BILIRUBIN UR: NEGATIVE MG/DL
BLOOD UR: NEGATIVE MG/DL
BUN SERPL-MCNC: 18 MG/DL (ref 7–30)
CALCIUM SERPL-MCNC: 9.2 MG/DL (ref 8.5–10.4)
CHLORIDE SERPLBLD-SCNC: 107 MMOL/L (ref 94–109)
CHOLEST SERPL-MCNC: 173 MG/DL (ref 0–200)
CHOLEST/HDLC SERPL: 2.6 {RATIO} (ref 0–5)
CO2 SERPL-SCNC: 29 MMOL/L (ref 20–32)
CREAT SERPL-MCNC: 1.3 MG/DL (ref 0.8–1.5)
EGFR CALCULATED (NON BLACK REFERENCE): 57
FASTING SPECIMEN: YES
GLUCOSE SERPL-MCNC: 106 MG/DL (ref 60–99)
GLUCOSE SERPL-MCNC: 111 MG/DL (ref 60–99)
GLUCOSE URINE: NEGATIVE
HBA1C MFR BLD: 5.7 % (ref 4.1–5.7)
HDLC SERPL-MCNC: 67 MG/DL
KETONES UR QL: NEGATIVE MG/DL
LDLC SERPL CALC-MCNC: 85 MG/DL (ref 0–129)
LEUKOCYTE ESTERASE UR: NEGATIVE
NITRITE UR QL STRIP: NEGATIVE MG/DL
PH UR STRIP: 5.5 [PH] (ref 4.5–8)
POTASSIUM SERPL-SCNC: 4.7 MMOL/L (ref 3.4–5.3)
PROTEIN UR: NEGATIVE MG/DL
PSA SERPL-ACNC: 2.48 UG/L (ref 0–4)
SODIUM SERPL-SCNC: 143 MMOL/L (ref 137.3–146.3)
SP GR UR STRIP: 1.02 (ref 1–1.03)
TRIGL SERPL-MCNC: 105 MG/DL (ref 0–150)
UROBILINOGEN UR STRIP-ACNC: NORMAL E.U./DL
VLDL-CHOLESTEROL: 21 (ref 7–32)

## 2021-06-14 RX ORDER — LOSARTAN POTASSIUM AND HYDROCHLOROTHIAZIDE 12.5; 1 MG/1; MG/1
1 TABLET ORAL DAILY
Qty: 30 TABLET | Refills: 1 | Status: SHIPPED | OUTPATIENT
Start: 2021-06-14 | End: 2021-07-14 | Stop reason: DRUGHIGH

## 2021-06-14 RX ORDER — EZETIMIBE 10 MG/1
10 TABLET ORAL DAILY
Qty: 90 TABLET | Refills: 4 | Status: SHIPPED | OUTPATIENT
Start: 2021-06-14 | End: 2021-06-22

## 2021-06-14 RX ORDER — ATORVASTATIN CALCIUM 40 MG/1
TABLET, FILM COATED ORAL
Qty: 135 TABLET | Refills: 4 | Status: SHIPPED | OUTPATIENT
Start: 2021-06-14 | End: 2022-08-08

## 2021-06-14 ASSESSMENT — MIFFLIN-ST. JEOR: SCORE: 1477.89

## 2021-06-14 NOTE — NURSING NOTE
"76 year old  Chief Complaint   Patient presents with     Medicare Visit     76 yrs old and labs        Blood pressure (!) 166/71, pulse 51, temperature 97.5  F (36.4  C), temperature source Temporal, resp. rate 16, height 1.702 m (5' 7\"), weight 78.9 kg (174 lb), SpO2 95 %. Body mass index is 27.25 kg/m .  Patient Active Problem List   Diagnosis     Insomnia     Preventative health care     Dysthymia     Essential hypertension     Hyperlipidemia LDL goal <160     Transient global amnesia     Medicare annual wellness visit, subsequent       Wt Readings from Last 2 Encounters:   06/14/21 78.9 kg (174 lb)   05/26/21 78.9 kg (174 lb)     BP Readings from Last 3 Encounters:   06/14/21 (!) 166/71   05/26/21 (!) 157/96   01/31/20 (!) 160/84         Current Outpatient Medications   Medication     atorvastatin (LIPITOR) 40 MG tablet     Calcium Malate POWD     ezetimibe (ZETIA) 10 MG tablet     FLUoxetine (PROZAC) 20 MG capsule     Loratadine (CLARITIN PO)     losartan (COZAAR) 100 MG tablet     Methylsulfonylmethane (MSM) 1000 MG CAPS     zaleplon (SONATA) 5 MG capsule     No current facility-administered medications for this visit.        Social History     Tobacco Use     Smoking status: Never Smoker     Smokeless tobacco: Never Used   Substance Use Topics     Alcohol use: Yes     Drug use: No       Health Maintenance Due   Topic Date Due     ADVANCE CARE PLANNING  Never done     DEPRESSION ACTION PLAN  Never done     ZOSTER IMMUNIZATION (1 of 2) Never done     PHQ-9  04/30/2020     MEDICARE ANNUAL WELLNESS VISIT  01/31/2021     COLORECTAL CANCER SCREENING  02/03/2021       No results found for: PAP      June 14, 2021 8:34 AM  "

## 2021-06-14 NOTE — PATIENT INSTRUCTIONS
1. Shingles vaccine at [facility].    2. Tdap (tetanus) booster at [facility], too. (You can do this now.)    3. We're making a subtle change to your BP medication.

## 2021-07-06 ENCOUNTER — MYC REFILL (OUTPATIENT)
Dept: FAMILY MEDICINE | Facility: CLINIC | Age: 76
End: 2021-07-06

## 2021-07-06 DIAGNOSIS — G47.00 INSOMNIA, UNSPECIFIED TYPE: ICD-10-CM

## 2021-07-07 RX ORDER — ZALEPLON 5 MG/1
15 CAPSULE ORAL AT BEDTIME
Qty: 120 CAPSULE | Refills: 0 | Status: SHIPPED | OUTPATIENT
Start: 2021-07-07 | End: 2021-08-14

## 2021-07-07 NOTE — TELEPHONE ENCOUNTER
Last Office Visit: 6/14/21  [facility] [place]  Medication last refilled: 5/17/21     verified - last fill date: 5/19/21    Routing refill request to provider for review/approval because:  Drug not on the [facility] refill protocol     Deidra Hay RN, BSN

## 2021-07-08 DIAGNOSIS — I10 ESSENTIAL HYPERTENSION: ICD-10-CM

## 2021-07-08 NOTE — TELEPHONE ENCOUNTER
Last Office Visit: 6/14/21  [facility] [place]ments: None  Medication last refilled: 6/14/21 #30 1refills    Prescription declined at this time as cannot fill again until August.    Deidra Hay, RN, BSN

## 2021-07-09 RX ORDER — LOSARTAN POTASSIUM AND HYDROCHLOROTHIAZIDE 12.5; 1 MG/1; MG/1
1 TABLET ORAL DAILY
Qty: 30 TABLET | Refills: 1 | OUTPATIENT
Start: 2021-07-09

## 2021-07-11 PROBLEM — N32.81 OVERACTIVE BLADDER: Status: ACTIVE | Noted: 2021-07-11

## 2021-07-11 NOTE — PROGRESS NOTES
CHIEF COMPLAINT:  Medicare annual wellness visit, subsequent.    HISTORY OF PRESENT ILLNESS:  Nakul is a 76-year-old here for the above.  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  As a result, his stress has dropped markedly.  Nevertheless, he is still having some issues with sleeping.  He simply cannot get a good night of restful sleep.  His wife echoes his concerns per his report.    ACTIVE MEDICAL PROBLEMS:  Reviewed.  We had a good conversation about many of his ongoing issues.  Meds were reviewed in detail.  He continues to take zaleplon 5 mg at night.  He is on a combination losartan/hydrochlorothiazide for his hypertension.  Blood pressure initially 166/71, and upon recheck, it was 160/78.  He is on fluoxetine 20 mg once daily for some degree of depression that is quite stable.  In fact, it has been so controlled that we have referred to it mainly as dysthymia given his PHQ-9 score is always less than 10.  He has symptoms of an overactive bladder.  We talked about this in detail.  He gets the urge to void about 20 times a day.  He is able to initiate his stream without much difficulty.  Stream strength is quite good.  No postvoid dribble noted.  He does not want to take any medication for it at this time, but we will check a urine just to make sure that there is nothing going on.  We will also do a PSA 50, as we would in any case.    One of the bigger concerns he has is he has ongoing seasonal allergy symptoms that really bother him.  He has never had it worked up.  He would actually like to know what he is allergic to so he might avoid it.  We will place a referral for him.  Perhaps is biggest frustrating symptom is his nose runs.  We talked about using some over-the-counter fluticasone nasal spray.      HEALTHCARE MAINTENANCE:  Eye care is up to date.  Hearing screening is up to date.  Dental care is up to date.  Blood pressure elevated as mentioned.  Weight reviewed.  Immunizations:  He had his  COVID vaccines on 02/01/2021 and 03/01/2021.  He had a flu shot in the fall.  He is up to date with both pneumococcal vaccines.  Tetanus booster is due at his pharmacy.  In addition, I would recommend that he get his shingles vaccine at [facility] as well.  He is up to date with colon cancer screening.  PSA 50 will be done today.    MEDICARE QUESTIONS:  No problems with activities of daily living at home.  He has had no falls at home in the last year.  No change or worsening of his depression symptoms.  No memory loss concerns.    OBJECTIVE:  Nakul is in absolutely no distress.  Affect is upbeat.  Blood pressure reading elevated as mentioned.  Vital signs are stable.  Alert and oriented x3.  Very insightful and speech is articulate.  HEENT:  Head is normocephalic, atraumatic.  Ears are free of any significant cerumen.  The TMs look fine.  There is no pain with palpation of the frontal or maxillary sinuses.  Eyes are grossly normal.  He is wearing a mask.  NECK:  Reveals no anterior or posterior chain adenopathy.  No visible or palpable thyromegaly.  No carotid bruits are heard.  LUNGS:  Clear to auscultation bilaterally.  HEART:  Reveals a regular rate and rhythm without murmur.  EXTREMITIES:  Ankles are free of any edema.    ASSESSMENT AND PLAN:    1.  Medicare annual wellness visit, up to date with healthcare maintenance strategies.  2.  Up to date with all immunizations with 2 exceptions:  He needs to get a Tdap booster at his pharmacy as well as the shingles vaccine with a booster.  He will see to that.  3.  Screening for prostate cancer with a PSA 50.  4.  Screening for colon cancer with a Cologuard test given low risk.  He prefers that over a colonoscopy.  If negative, he may not need to do anything else.  We could potentially do so this again at age 79.  5.  History of dysthymia, on fluoxetine.  Continue with that.  6.  History of hyperlipidemia, on atorvastatin 40 mg.  Lipid panel plus pending.  He will be notified  of the results.  Follow up with any problems or questions regarding that.  7.  Essential hypertension, under less than ideal control.  Therefore, we are going to change his medications slightly to a combination of losartan/hydrochlorothiazide 100/12.5 mg tablet.  This should make all the difference.  8.  Overactive bladder symptoms.  Urinalysis and PSA 50 pending.  If anything looks fine and problems continue, could consider a medication like oxybutynin.  However, we need to be very careful with its anticholinergic effects in that we do not want to fatigue him or give him any kind of hallucinations or other concerns.  9.  Seasonal allergic rhinitis, unspecified trigger.  Referral to Allergy and Asthma for further workup and specific diagnosis.  10.  History of elevated glucose.  A1c pending.  11.  Call with any problems or questions.

## 2021-07-14 DIAGNOSIS — I10 ESSENTIAL HYPERTENSION: Primary | ICD-10-CM

## 2021-07-14 RX ORDER — AMLODIPINE BESYLATE 5 MG/1
5 TABLET ORAL DAILY
Qty: 30 TABLET | Refills: 0 | Status: SHIPPED | OUTPATIENT
Start: 2021-07-14 | End: 2021-08-03 | Stop reason: DRUGHIGH

## 2021-07-14 RX ORDER — LOSARTAN POTASSIUM 100 MG/1
100 TABLET ORAL DAILY
Qty: 30 TABLET | Refills: 0 | Status: SHIPPED | OUTPATIENT
Start: 2021-07-14 | End: 2021-08-09

## 2021-07-14 NOTE — TELEPHONE ENCOUNTER
Change scripts to losartan 100 mg and amlodipine 5 mg per Dr Betzy Epstein.   Evelyn Mendez RN  [facility]

## 2021-07-15 LAB — COLOGUARD-ABSTRACT: NEGATIVE

## 2021-08-03 DIAGNOSIS — I10 ESSENTIAL HYPERTENSION: Primary | ICD-10-CM

## 2021-08-03 RX ORDER — AMLODIPINE BESYLATE 10 MG/1
10 TABLET ORAL DAILY
Qty: 30 TABLET | Refills: 0 | Status: SHIPPED | OUTPATIENT
Start: 2021-08-03 | End: 2021-08-23

## 2021-08-03 NOTE — TELEPHONE ENCOUNTER
Change to amlodipine 10 mg pr Dr Betzy Epstein message.   Evelyn Mendez RN  [facility]

## 2021-08-09 DIAGNOSIS — I10 ESSENTIAL HYPERTENSION: ICD-10-CM

## 2021-08-09 RX ORDER — LOSARTAN POTASSIUM 100 MG/1
100 TABLET ORAL DAILY
Qty: 30 TABLET | Refills: 0 | Status: SHIPPED | OUTPATIENT
Start: 2021-08-09 | End: 2021-08-23

## 2021-08-09 NOTE — TELEPHONE ENCOUNTER
Last Office Visit: 6/14/21  [facility] [place] Appointments: None  Medication last refilled: 7/14/21 #30 with 0 refill(s)    Vital Signs 5/26/2021 6/14/2021 6/14/2021   Systolic 157 166 160   Diastolic 96 71 78     Medication is being filled for 1 time refill only due to:  Patient needs to be seen because due for blood pressure recheck.    Roomish message sent to patient to call and schedule blood pressure follow-up appointment.    Deidra Hay, RN, BSN     20-Jan-2020 21:48

## 2021-08-23 ENCOUNTER — OFFICE VISIT (OUTPATIENT)
Dept: FAMILY MEDICINE | Facility: CLINIC | Age: 76
End: 2021-08-23
Payer: MEDICARE

## 2021-08-23 VITALS
DIASTOLIC BLOOD PRESSURE: 78 MMHG | RESPIRATION RATE: 15 BRPM | WEIGHT: 172.5 LBS | HEIGHT: 67 IN | BODY MASS INDEX: 27.07 KG/M2 | TEMPERATURE: 97.2 F | OXYGEN SATURATION: 96 % | HEART RATE: 60 BPM | SYSTOLIC BLOOD PRESSURE: 137 MMHG

## 2021-08-23 DIAGNOSIS — G47.09 OTHER INSOMNIA: ICD-10-CM

## 2021-08-23 DIAGNOSIS — I10 ESSENTIAL HYPERTENSION: Primary | ICD-10-CM

## 2021-08-23 DIAGNOSIS — N32.89 BLADDER SPASMS: ICD-10-CM

## 2021-08-23 RX ORDER — OXYBUTYNIN CHLORIDE 5 MG/1
5 TABLET, EXTENDED RELEASE ORAL DAILY
Qty: 30 TABLET | Refills: 1 | Status: SHIPPED | OUTPATIENT
Start: 2021-08-23 | End: 2021-09-16

## 2021-08-23 RX ORDER — AMLODIPINE BESYLATE 10 MG/1
10 TABLET ORAL DAILY
Qty: 90 TABLET | Refills: 4 | Status: SHIPPED | OUTPATIENT
Start: 2021-08-23 | End: 2022-12-14

## 2021-08-23 RX ORDER — ESZOPICLONE 3 MG/1
3 TABLET, FILM COATED ORAL AT BEDTIME
Qty: 30 TABLET | Refills: 0 | Status: SHIPPED | OUTPATIENT
Start: 2021-08-23 | End: 2021-10-26

## 2021-08-23 RX ORDER — LOSARTAN POTASSIUM 100 MG/1
100 TABLET ORAL DAILY
Qty: 90 TABLET | Refills: 4 | Status: SHIPPED | OUTPATIENT
Start: 2021-08-23 | End: 2022-10-14

## 2021-08-23 ASSESSMENT — PATIENT HEALTH QUESTIONNAIRE - PHQ9: 5. POOR APPETITE OR OVEREATING: NOT AT ALL

## 2021-08-23 ASSESSMENT — ANXIETY QUESTIONNAIRES
6. BECOMING EASILY ANNOYED OR IRRITABLE: NOT AT ALL
2. NOT BEING ABLE TO STOP OR CONTROL WORRYING: NOT AT ALL
1. FEELING NERVOUS, ANXIOUS, OR ON EDGE: NOT AT ALL
7. FEELING AFRAID AS IF SOMETHING AWFUL MIGHT HAPPEN: NOT AT ALL
IF YOU CHECKED OFF ANY PROBLEMS ON THIS QUESTIONNAIRE, HOW DIFFICULT HAVE THESE PROBLEMS MADE IT FOR YOU TO DO YOUR WORK, TAKE CARE OF THINGS AT HOME, OR GET ALONG WITH OTHER PEOPLE: NOT DIFFICULT AT ALL
GAD7 TOTAL SCORE: 0
3. WORRYING TOO MUCH ABOUT DIFFERENT THINGS: NOT AT ALL
5. BEING SO RESTLESS THAT IT IS HARD TO SIT STILL: NOT AT ALL

## 2021-08-23 ASSESSMENT — MIFFLIN-ST. JEOR: SCORE: 1467.11

## 2021-08-23 NOTE — NURSING NOTE
"76 year old  Chief Complaint   Patient presents with     Hypertension     BP concerns      Recheck Medication     check in on sleeping medication        Blood pressure (!) 144/80, pulse 60, temperature 97.2  F (36.2  C), temperature source Skin, resp. rate 15, height 1.695 m (5' 6.75\"), weight 78.2 kg (172 lb 8 oz), SpO2 96 %. Body mass index is 27.22 kg/m .  Patient Active Problem List   Diagnosis     Insomnia     Dysthymia     Essential hypertension     Hyperlipidemia LDL goal <70     Transient global amnesia     Medicare annual wellness visit, subsequent     Overactive bladder       Wt Readings from Last 2 Encounters:   08/23/21 78.2 kg (172 lb 8 oz)   06/14/21 78.9 kg (174 lb)     BP Readings from Last 3 Encounters:   08/23/21 (!) 144/80   06/14/21 (!) 160/78   05/26/21 (!) 157/96         Current Outpatient Medications   Medication     amLODIPine (NORVASC) 10 MG tablet     atorvastatin (LIPITOR) 40 MG tablet     Calcium Malate POWD     FLUoxetine (PROZAC) 20 MG capsule     Loratadine (CLARITIN PO)     losartan (COZAAR) 100 MG tablet     Methylsulfonylmethane (MSM) 1000 MG CAPS     zaleplon (SONATA) 5 MG capsule     No current facility-administered medications for this visit.       Social History     Tobacco Use     Smoking status: Never Smoker     Smokeless tobacco: Never Used   Substance Use Topics     Alcohol use: Yes     Drug use: No       Health Maintenance Due   Topic Date Due     ADVANCE CARE PLANNING  Never done     ZOSTER IMMUNIZATION (1 of 2) Never done     PHQ-9  04/30/2020       No results found for: PAP      August 23, 2021 9:59 AM    "

## 2021-08-23 NOTE — PROGRESS NOTES
"CHIEF COMPLAINT: BP concerns, sleep med check      HISTORY OF PRESENT ILLNESS:  Amari Eddy is a 76 year old male with a history of HTN who presents for a BP concerns and a sleep medication check.    Nakul has a history of hypertension. He uses a BP monitor at home, which he believes may be inaccurate. His machine typically reads a SBP of 155-165, and his DBP is always <80. He takes losartan 100 mg daily and amlodipine 10 mg daily. Today, his repeat BP was 137/78.  Nakul notes that he is tolerating his medications well. Nakul is planning on buying a new, more accurate BP monitor, and we discussed options today.    Nakul notes that he has been taking the zaleplon 15 mg nightly + additional 5 mg PRN for 20 years and is \"sure that he is addicted to them\" psychologically but doesn't believe that they are effective. He saw sleep medicine on 6/15/15, who recommended that he continue his zaleplon because he \"wouldn't sleep as well without it\". He notes that most nights, he is able to go to sleep \"fine\" and wakes up at 3 AM because has has \"trained myself to\". Nakul is open to trying a new sleep medication.     Nakul notes that he is experiencing bladder spasms. If he sits down for \"any length of time\" and stands up, he feels the immediate urge to use the restroom. Due to the disruptiveness of his bladder symptoms, he would like to treat this, but has agreed to start this medication after the trial of his new sleep medication to avoid potential side effect confusion.      MEDICATIONS:   Carefully Reviewed      REVIEW OF SYSTEMS:  10-point review of systems negative unless otherwise stated in the HPI.       OBJECTIVE:    EXAM:  GENERAL: Nakul is in no distress.  Affect is upbeat.   Alert and oriented x3  /78   Pulse 60   Temp 97.2  F (36.2  C) (Skin)   Resp 15   Ht 1.695 m (5' 6.75\")   Wt 78.2 kg (172 lb 8 oz)   SpO2 96%   BMI 27.22 kg/m    HEENT:  Head is normocephalic, atraumatic.  Eyes are grossly normal.  Nose and " mouth masked.   SKIN:  Warm and dry.       LABORATORY DATA: None completed for this visit.       ASSESSMENT AND PLAN:   1. Essential hypertension: Upon recheck, Nakul's BP is within target range. He is tolerating his medications well, refilled today. He will buy a new ([employer]) home BP monitor.   2. Persistent insomnia: After discussing his sleep patterns and options, Nakul has agreed to start a trial of eszopiclone/Lunesta 3 mg nightly as it has a much longer half-life (6 hours vs. 1 hour).   3. Bladder spasms: Due to the disruptiveness of his bladder symptoms, Nakul has agreed to start oxybutynin ER 5 mg daily. We discussed the benefits and risks of this new medication at length today. To avoid confusion on potential side effects, Nakul will wait to start this medication until after trying Lunesta. Will increase to 10 and even 15 mg if/when needed.    I, Natali Arellano, am serving as a scribe to document services personally performed by Dr. José Bo, based on data collection and the provider's statements to me.     Attestation: I, Dr. José Bo, have reviewed, edited, and approved the above note.

## 2021-08-27 ENCOUNTER — TELEPHONE (OUTPATIENT)
Dept: FAMILY MEDICINE | Facility: CLINIC | Age: 76
End: 2021-08-27

## 2021-08-27 NOTE — TELEPHONE ENCOUNTER
"Pt calling today - states he took first dose of oxybutynin ER yesterday during the day. States he went to a big family party last evening, and had \" a big problem\". States he became agitated, yelled at his wife - he states these are not normal behaviors. He states he had had a moderate amount of alcohol at the time. States the med did help with the bladder spasms, but he will hold the med for now, because this behavior \"scared me'.   Checked possible side effects of med - behavior problems only listed with possible overdose.   Pt is asking that provider decide if another med might be substituted - will route message to Dr Bo.  Evelyn Mendez RN  [facility]  "

## 2021-09-02 NOTE — TELEPHONE ENCOUNTER
Clinton Kumar,     This is interesting - there is risk of CNS effects with oxybutynin which could include agitation, although I would anticipate more delirium, confusion or somnolence with the mechanism of action, or at least in association with the agitation. The risk of these as side effects is also low (reported as less than 5%). There is not a direct drug interaction with alcohol or any other medications that are currently listed in his chart.     Given the effectiveness, I would think that it would be safe to re-try the medication without alcohol use and monitor for side effects or changes.     Let me know if you have any follow up questions,   Kellie

## 2021-09-07 ASSESSMENT — PATIENT HEALTH QUESTIONNAIRE - PHQ9: SUM OF ALL RESPONSES TO PHQ QUESTIONS 1-9: 0

## 2021-09-08 ASSESSMENT — ANXIETY QUESTIONNAIRES: GAD7 TOTAL SCORE: 0

## 2021-09-16 DIAGNOSIS — N32.89 BLADDER SPASMS: ICD-10-CM

## 2021-09-16 NOTE — TELEPHONE ENCOUNTER
Patient calls back, he would like to increase dose of oxybutynin from 5 mg to 10 mg daily. He has about #10 pills remaining of the 5 mg, so will double up those. Will send over a new Rx for 10 mg tabs. He will report back on efficacy of this new dose, and is willing to increase further as needed.     Routing refill request to provider for review/approval because: See above - increasing dose to 10 mg since 5 mg is not effective.     Mila Quiros MS RN-BC  09/16/21  3:53 PM

## 2021-09-16 NOTE — TELEPHONE ENCOUNTER
Call placed to patient, LVM - according to chart review pt is struggling with the new medication oxybutynin. Would like to know if he wants to try a different medication, increase dose, or refer to urology? Please call back or send Leakyt message.   Mila Quiros MS RN-BC  09/16/21  3:37 PM

## 2021-09-17 RX ORDER — OXYBUTYNIN CHLORIDE 10 MG/1
10 TABLET, EXTENDED RELEASE ORAL DAILY
Qty: 30 TABLET | Refills: 1 | Status: SHIPPED | OUTPATIENT
Start: 2021-09-17 | End: 2021-09-24 | Stop reason: ALTCHOICE

## 2021-09-21 ENCOUNTER — TELEPHONE (OUTPATIENT)
Dept: FAMILY MEDICINE | Facility: CLINIC | Age: 76
End: 2021-09-21

## 2021-09-21 DIAGNOSIS — N32.81 OVERACTIVE BLADDER: Primary | ICD-10-CM

## 2021-09-21 NOTE — TELEPHONE ENCOUNTER
Amari called feeling of having a full bladder and having urgency and 75% of the time cannot void when he tries has become very life disrupting and bothersome.  Describes it as a strong tingling sensation versus pain.  Urine normal color.  No cramping.  States he's at his witts end.  Will forward to Dr Bo for further recommendations and will call amari back once I hear from Dr. Bo.  Deidra Hay RN, BSN  [facility]  09/21/21  3:11 PM

## 2021-09-22 ENCOUNTER — PRE VISIT (OUTPATIENT)
Dept: UROLOGY | Facility: CLINIC | Age: 76
End: 2021-09-22

## 2021-09-22 NOTE — TELEPHONE ENCOUNTER
Reason for visit: OAB consult      Dx/Hx/Sx: urgency, bladder spasms     Records/imaging/labs/orders: in epic     At Rooming: video visit

## 2021-09-22 NOTE — TELEPHONE ENCOUNTER
MEDICAL RECORDS REQUEST   [facility]  Urology Clinic  [address] [place] [address]  PHONE: [phone]  Fax: [phone]        FUTURE VISIT INFORMATION                                                   Amari Eddy, : 1945 scheduled for future visit at [facility]    APPOINTMENT INFORMATION:    Date: 2021    Provider:  Leanne Giron CNP    Reason for Visit/Diagnosis: Overactive bladder [N32.81]    REFERRAL INFORMATION:    Referring provider:  José Bo MD    Specialty: Primary Care    Referring providers clinic: [facility]    Clinic contact number:  [phone]    RECORDS REQUESTED FOR VISIT                                                     NOTES  STATUS/DETAILS   OFFICE NOTE from referring provider  yes   OFFICE NOTE from other specialist  no   DISCHARGE SUMMARY from hospital  no   DISCHARGE REPORT from the ER  no   OPERATIVE REPORT  no   MEDICATION LIST  yes   LABS     URINALYSIS (UA)  yes   URINE CYTOLOGY  no     PRE-VISIT CHECKLIST      Record collection complete Yes   Appointment appropriately scheduled           (right time/right provider) Yes   Joint diagnostic appointment coordinated correctly          (ensure right order & amount of time) Yes   MyChart activation Yes   Questionnaire complete If no, please explain pending

## 2021-09-24 ENCOUNTER — VIRTUAL VISIT (OUTPATIENT)
Dept: UROLOGY | Facility: CLINIC | Age: 76
End: 2021-09-24
Attending: FAMILY MEDICINE
Payer: MEDICARE

## 2021-09-24 DIAGNOSIS — R35.0 BENIGN PROSTATIC HYPERPLASIA WITH URINARY FREQUENCY: Primary | ICD-10-CM

## 2021-09-24 DIAGNOSIS — N40.1 BENIGN PROSTATIC HYPERPLASIA WITH URINARY FREQUENCY: Primary | ICD-10-CM

## 2021-09-24 PROCEDURE — 99203 OFFICE O/P NEW LOW 30 MIN: CPT | Mod: 95 | Performed by: NURSE PRACTITIONER

## 2021-09-24 RX ORDER — TAMSULOSIN HYDROCHLORIDE 0.4 MG/1
0.4 CAPSULE ORAL DAILY
Qty: 90 CAPSULE | Refills: 3 | Status: SHIPPED | OUTPATIENT
Start: 2021-09-24 | End: 2022-08-08

## 2021-09-24 NOTE — PROGRESS NOTES
Nakul is a 76 year old who is being evaluated via a billable video visit.       How would you like to obtain your AVS? MyChart  If the video visit is dropped, the invitation should be resent by: Text to cell phone: [phone]  Will anyone else be joining your video visit? No    Video Start Time: 10:54 AM  Video-Visit Details    Type of service:  Video Visit    Video End Time: 11:12 AM    Originating Location (pt. Location): Home    Distant Location (provider location):  [facility] [place]     Platform used for Video Visit: Ryley Harmonman complains of   Chief Complaint   Patient presents with     Consult For     bladder spasms        Assessment/Plan:  76 year old male with irritative LUTS, including urgency and frequency, despite, and seemingly worsened by, oxybutynin. Symptoms seem to be somewhat positional (sitting vs. standing, sleeping on side vs. back). I suspect his symptoms are prostate-related and he may be retaining. Unable to confirm this today due to the nature of our visit. Recommend he trial Flomax as an alternative to oxybutynin, and he agrees with this plan.  -Start Flomax 0.4 mg daily. Potential side effects discussed and Rx sent to pharmacy.  -Stop oxybutynin.   -He may be leaving town for the winter, but will plan to follow up with Dr. Bo for future refills. I am happy to see him back as needed if symptoms do not respond to Flomax. Would likely pursue a uroflow/PVR as the next step, if needed.     Leanne Giron, CNP  Department of Urology      Subjective:   76 year old male with a history of HTN and HLD who presents for virtual consult regarding irritative LUTS, including severe urgency and frequency despite oxybutynin. Described as a bladder  spasm  that can be felt constantly. UA from 6/14 was unremarkable. A1C from the same day 5.7, and PSA 2.48.     He describes an urge to urinate whether he has to go or not. This happens every time he goes from  sitting to standing and walking. Feels to need to void 1-2 times per hour. If he sleeps on his side, he has no issue, but if he sleeps on his black, he gets the urge. Regarding the oxybutynin, he felt that higher doses of this was actually making his symptoms worse. No known personal or family history of prostate issues.       Objective:     Exam:   Patient is a 76 year old male   General Appearance: Well groomed, hygenic  Respiratory: No cough, no respiratory distress or labored breathing  Musculoskeletal: Grossly normal with no gross deficits  Skin: No discoloration or apparent rashes  Neurologic: No tremors  Psychiatric: Alert and oriented  Further examination is deferred due to the nature of our visit.

## 2021-09-24 NOTE — PATIENT INSTRUCTIONS
UROLOGY CLINIC VISIT PATIENT INSTRUCTIONS    -Start Flomax 0.4 mg daily.  -Stop oxybutynin.   -Follow up with Dr. Bo for future refills. I am happy to see you back as needed moving forward.     If you have any issues, questions or concerns in the meantime, do not hesitate to contact us at [phone] or via [employer].     It was a pleasure meeting with you today.  Thank you for allowing me and my team the privilege of caring for you today.  YOU are the reason we are here, and I truly hope we provided you with the excellent service you deserve.  Please let us know if there is anything else we can do for you so that we can be sure you are leaving completely satisfied with your care experience.    Leanne Giron, CNP

## 2021-09-24 NOTE — LETTER
9/24/2021       RE: Amari Eddy  [address]  [facility] [address]     Dear Colleague,    Thank you for referring your patient, Amari Eddy, to the [facility] [place] [facility]. Please see a copy of my visit note below.    Nakul is a 76 year old who is being evaluated via a billable video visit.       How would you like to obtain your AVS? MyChart  If the video visit is dropped, the invitation should be resent by: Text to cell phone: [phone]  Will anyone else be joining your video visit? No    Video Start Time: 10:54 AM  Video-Visit Details    Type of service:  Video Visit    Video End Time: 11:12 AM    Originating Location (pt. Location): Home    Distant Location (provider location):  [facility] [place]     Platform used for Video Visit: [employer]       Amari Eddy complains of   Chief Complaint   Patient presents with     Consult For     bladder spasms        Assessment/Plan:  76 year old male with irritative LUTS, including urgency and frequency, despite, and seemingly worsened by, oxybutynin. Symptoms seem to be somewhat positional (sitting vs. standing, sleeping on side vs. back). I suspect his symptoms are prostate-related and he may be retaining. Unable to confirm this today due to the nature of our visit. Recommend he trial Flomax as an alternative to oxybutynin, and he agrees with this plan.  -Start Flomax 0.4 mg daily. Potential side effects discussed and Rx sent to pharmacy.  -Stop oxybutynin.   -He may be leaving town for the winter, but will plan to follow up with Dr. Bo for future refills. I am happy to see him back as needed if symptoms do not respond to Flomax. Would likely pursue a uroflow/PVR as the next step, if needed.     Leanne Giron, CNP  Department of Urology      Subjective:   76 year old male with a history of HTN and HLD who presents for virtual  consult regarding irritative LUTS, including severe urgency and frequency despite oxybutynin. Described as a bladder  spasm  that can be felt constantly. UA from 6/14 was unremarkable. A1C from the same day 5.7, and PSA 2.48.     He describes an urge to urinate whether he has to go or not. This happens every time he goes from sitting to standing and walking. Feels to need to void 1-2 times per hour. If he sleeps on his side, he has no issue, but if he sleeps on his black, he gets the urge. Regarding the oxybutynin, he felt that higher doses of this was actually making his symptoms worse. No known personal or family history of prostate issues.       Objective:     Exam:   Patient is a 76 year old male   General Appearance: Well groomed, hygenic  Respiratory: No cough, no respiratory distress or labored breathing  Musculoskeletal: Grossly normal with no gross deficits  Skin: No discoloration or apparent rashes  Neurologic: No tremors  Psychiatric: Alert and oriented  Further examination is deferred due to the nature of our visit.

## 2021-10-06 ENCOUNTER — ALLIED HEALTH/NURSE VISIT (OUTPATIENT)
Dept: FAMILY MEDICINE | Facility: CLINIC | Age: 76
End: 2021-10-06
Payer: MEDICARE

## 2021-10-06 DIAGNOSIS — Z23 NEED FOR PROPHYLACTIC VACCINATION AND INOCULATION AGAINST INFLUENZA: Primary | ICD-10-CM

## 2021-10-06 NOTE — PROGRESS NOTES
"Injectable Influenza Immunization Documentation    1.  Has the patient received the information for the injectable influenza vaccine? YES     2. Is the patient 6 months of age or older? YES     3. Does the patient have any of the following contraindications?         Severe allergy to eggs? No     Severe allergic reaction to previous influenza vaccines? No   Severe allergy to latex? No       History of Guillain-Twin Bridges syndrome? No     Currently have a temperature greater than 100.4F? No        4.  Severely egg allergic patients should have flu vaccine eligibility assessed by an MD, RN, or pharmacist, and those who received flu vaccine should be observed for 15 min by an MD, RN, Pharmacist, Medical Technician, or member of clinic staff.\": YES    5. Latex-allergic patients should be given latex-free influenza vaccine Yes. Please reference the Vaccine latex table to determine if your clinic s product is latex-containing.       Vaccination given by Yareli Enriquez [facility]  October 6, 2021 2:21 PM        Prior to immunization administration, verified patients identity using patient s name and date of birth. Please see Immunization Activity for additional information.     Screening Questionnaire for Adult Immunization    Are you sick today?   No   Do you have allergies to medications, food, a vaccine component or latex?   No   Have you ever had a serious reaction after receiving a vaccination?   No   Do you have a long-term health problem with heart, lung, kidney, or metabolic disease (e.g., diabetes), asthma, a blood disorder, no spleen, complement component deficiency, a cochlear implant, or a spinal fluid leak?  Are you on long-term aspirin therapy?   No   Do you have cancer, leukemia, HIV/AIDS, or any other immune system problem?   No   Do you have a parent, brother, or sister with an immune system problem?   No   In the past 3 months, have you taken medications that affect  your immune system, such as prednisone, other " steroids, or anticancer drugs; drugs for the treatment of rheumatoid arthritis, Crohn s disease, or psoriasis; or have you had radiation treatments?   No   Have you had a seizure, or a brain or other nervous system problem?   No   During the past year, have you received a transfusion of blood or blood    products, or been given immune (gamma) globulin or antiviral drug?   No   For women: Are you pregnant or is there a chance you could become       pregnant during the next month?   No   Have you received any vaccinations in the past 4 weeks?   No     Immunization questionnaire answers were all negative.        Per orders of Dr. Bo, injection of Flu and Shingrix given by Yareli Enriquez CMA. Patient instructed to remain in clinic for 15 minutes afterwards, and to report any adverse reaction to me immediately.       Screening performed by Yareli Enriquez CMA on 10/6/2021 at 2:21 PM.    Amari Eddy comes into clinic today at the request of Dr. Bo Ordering Provider for Flu and Shingrix.    This service provided today was under the supervising provider of the day Dr. Bo, who was available if needed.    Yareli Enriquez CMA

## 2021-10-26 DIAGNOSIS — F51.01 PRIMARY INSOMNIA: Primary | ICD-10-CM

## 2021-10-26 NOTE — TELEPHONE ENCOUNTER
Last visit 8/23/21  Last refill 8/16/21 - checked   OK for refill per Dr Bo Mychart message.   Evelyn Mendez RN  [facility]

## 2021-10-27 RX ORDER — ZALEPLON 5 MG/1
CAPSULE ORAL
Qty: 120 CAPSULE | Refills: 0 | Status: SHIPPED | OUTPATIENT
Start: 2021-10-27 | End: 2021-10-28

## 2021-10-28 ENCOUNTER — TELEPHONE (OUTPATIENT)
Dept: FAMILY MEDICINE | Facility: CLINIC | Age: 76
End: 2021-10-28

## 2021-10-28 DIAGNOSIS — F51.01 PRIMARY INSOMNIA: ICD-10-CM

## 2021-10-28 RX ORDER — ZALEPLON 5 MG/1
CAPSULE ORAL
Qty: 120 CAPSULE | Refills: 0 | Status: SHIPPED | OUTPATIENT
Start: 2021-10-28 | End: 2021-12-29

## 2021-10-28 NOTE — TELEPHONE ENCOUNTER
Who is calling? Patient  Medication name: zaleplon  Is this a refill request? No  Have they contacted the pharmacy?  Yes  Pharmacy:     [facility]/pharmacy #[id number] - [place], [place] - [address] [facility]  [address] [facility] [address]  Phone: [phone] Fax: [phone]    Question/Concern: Would like resent to this pharmacy  Would patient like a call back? Shawna Perez    See refill enc for today.  Canceled fill at local [facility] and resent to MD to diaz for [facility].    Laila Mccauley RN  October 28, 2021 4:08 PM

## 2021-10-28 NOTE — TELEPHONE ENCOUNTER
Pt asking for med to be sent to [place] pharmacy today,  Just [facility] yesterday [facility] local [facility].  [facility]ed local [facility] and [facility], resending to MD to auth to now go to [place]    Routing refill request to provider for review/approval because:  Drug not on the [facility] refill protocol     Last filled for #120 on 8/16/21 per     Laila Mccauley RN  October 28, 2021 4:06 PM

## 2021-11-11 ENCOUNTER — NURSE TRIAGE (OUTPATIENT)
Dept: FAMILY MEDICINE | Facility: CLINIC | Age: 76
End: 2021-11-11
Payer: MEDICARE

## 2021-11-11 NOTE — TELEPHONE ENCOUNTER
Reason for Disposition    Weakness from poor fluid intake    Answer Assessment - Initial Assessment Questions  1. DESCRIPTION: Severe fatigue, runny nose, tired sleeping 12+ hours  2. SEVERITY: Moderate  3. ONSET:  Sunday, 11/7/21  4. CAUSE: Unsure  5. MEDICINES: No  6. OTHER SYMPTOMS: No    Protocols used: WEAKNESS (GENERALIZED) AND FATIGUE-A-OH    Patient having fatigue and tiredness and runny nose for 5 days.  He believes it is his allergies.  He has his COVID-19 booster appointment today and wonders if he should reschedule it.  I advised he push his appointment out a week or two and if he isn't better by Monday, to call us and schedule an appointment.  Patient agrees with this plan.  Deidra Hay RN, BSN  [facility]  11/11/21  9:11 AM

## 2021-11-15 ENCOUNTER — OFFICE VISIT (OUTPATIENT)
Dept: FAMILY MEDICINE | Facility: CLINIC | Age: 76
End: 2021-11-15
Payer: MEDICARE

## 2021-11-15 VITALS
DIASTOLIC BLOOD PRESSURE: 76 MMHG | OXYGEN SATURATION: 97 % | HEART RATE: 58 BPM | BODY MASS INDEX: 27.48 KG/M2 | HEIGHT: 67 IN | SYSTOLIC BLOOD PRESSURE: 132 MMHG | WEIGHT: 175.12 LBS | TEMPERATURE: 97.2 F

## 2021-11-15 DIAGNOSIS — R00.1 BRADYCARDIA: ICD-10-CM

## 2021-11-15 DIAGNOSIS — R68.89 FEELS SICK: Primary | ICD-10-CM

## 2021-11-15 LAB
ALBUMIN SERPL-MCNC: 3.3 G/DL (ref 3.4–5)
ALP SERPL-CCNC: 81 U/L (ref 40–150)
ALT SERPL W P-5'-P-CCNC: 30 U/L (ref 0–70)
ANION GAP SERPL CALCULATED.3IONS-SCNC: 4 MMOL/L (ref 3–14)
AST SERPL W P-5'-P-CCNC: 22 U/L (ref 0–45)
BILIRUB SERPL-MCNC: 0.3 MG/DL (ref 0.2–1.3)
BUN SERPL-MCNC: 21 MG/DL (ref 7–30)
CALCIUM SERPL-MCNC: 9.4 MG/DL (ref 8.5–10.1)
CHLORIDE BLD-SCNC: 106 MMOL/L (ref 94–109)
CO2 SERPL-SCNC: 30 MMOL/L (ref 20–32)
CREAT SERPL-MCNC: 1.18 MG/DL (ref 0.66–1.25)
ERYTHROCYTE [DISTWIDTH] IN BLOOD BY AUTOMATED COUNT: 12.6 % (ref 10–15)
GFR SERPL CREATININE-BSD FRML MDRD: 60 ML/MIN/1.73M2
GLUCOSE BLD-MCNC: 146 MG/DL (ref 70–99)
HCT VFR BLD AUTO: 44.9 % (ref 40–53)
HGB BLD-MCNC: 15.2 G/DL (ref 13.3–17.7)
MCH RBC QN AUTO: 32.8 PG (ref 26.5–33)
MCHC RBC AUTO-ENTMCNC: 33.9 G/DL (ref 31.5–36.5)
MCV RBC AUTO: 97 FL (ref 78–100)
PLATELET # BLD AUTO: 192 10E3/UL (ref 150–450)
POTASSIUM BLD-SCNC: 3.9 MMOL/L (ref 3.4–5.3)
PROT SERPL-MCNC: 7.2 G/DL (ref 6.8–8.8)
RBC # BLD AUTO: 4.64 10E6/UL (ref 4.4–5.9)
SODIUM SERPL-SCNC: 140 MMOL/L (ref 133–144)
TSH SERPL DL<=0.005 MIU/L-ACNC: 0.83 MU/L (ref 0.4–4)
WBC # BLD AUTO: 6.3 10E3/UL (ref 4–11)

## 2021-11-15 PROCEDURE — 84443 ASSAY THYROID STIM HORMONE: CPT | Performed by: FAMILY MEDICINE

## 2021-11-15 PROCEDURE — 82040 ASSAY OF SERUM ALBUMIN: CPT | Performed by: FAMILY MEDICINE

## 2021-11-15 PROCEDURE — 85027 COMPLETE CBC AUTOMATED: CPT | Performed by: FAMILY MEDICINE

## 2021-11-15 ASSESSMENT — MIFFLIN-ST. JEOR: SCORE: 1478.71

## 2021-11-15 NOTE — NURSING NOTE
"76 year old  Chief Complaint   Patient presents with     RECHECK     pt reports he hasn't been feeling well for over a month.       Blood pressure 132/76, pulse 58, temperature 97.2  F (36.2  C), height 1.695 m (5' 6.73\"), weight 79.4 kg (175 lb 1.9 oz), SpO2 97 %. Body mass index is 27.65 kg/m .  Patient Active Problem List   Diagnosis     Insomnia     Dysthymia     Essential hypertension     Hyperlipidemia LDL goal <70     Transient global amnesia     Medicare annual wellness visit, subsequent     Overactive bladder       Wt Readings from Last 2 Encounters:   11/15/21 79.4 kg (175 lb 1.9 oz)   08/23/21 78.2 kg (172 lb 8 oz)     BP Readings from Last 3 Encounters:   11/15/21 132/76   08/23/21 137/78   06/14/21 (!) 160/78         Current Outpatient Medications   Medication     amLODIPine (NORVASC) 10 MG tablet     atorvastatin (LIPITOR) 40 MG tablet     Calcium Malate POWD     FLUoxetine (PROZAC) 20 MG capsule     Loratadine (CLARITIN PO)     losartan (COZAAR) 100 MG tablet     Methylsulfonylmethane (MSM) 1000 MG CAPS     tamsulosin (FLOMAX) 0.4 MG capsule     zaleplon (SONATA) 5 MG capsule     No current facility-administered medications for this visit.       Social History     Tobacco Use     Smoking status: Never Smoker     Smokeless tobacco: Never Used   Substance Use Topics     Alcohol use: Yes     Drug use: No       Health Maintenance Due   Topic Date Due     ADVANCE CARE PLANNING  Never done     COVID-19 Vaccine (3 - Booster for Moderna series) 09/01/2021     DTAP/TDAP/TD IMMUNIZATION (2 - Td or Tdap) 10/31/2021     PHQ-9  11/23/2021       No results found for: PAP      November 15, 2021 2:05 PM  "

## 2021-11-15 NOTE — PROGRESS NOTES
OVERVIEW: Amari Eddy is a 76 year old male who presents to [facility] today for RECHECK (pt reports he hasn't been feeling well for over a month.)        ASSESSMENT/PLAN:      1. Nakul is a 75 yo who has been feeling ill for about the past month. His pulse at home was very low, in the 40's. Here it is in the normal range.    - Await labs  - Sent for 48 hour cardiac monitor  - Also, sent for Echocardiogram  - Encouraged to obtain a Covid test. This can be an in -home test if available at pharmacy and convenient. Otherwise, go to [facility].   - Minimize alcohol for a few nights until feeling better.   -Return to clinic as needed.       --Neftaly Ferrera MD      SUBJECTIVE:   About 1 month of feeling ill.   Was in [place] until around mid-October. Then felt fatigued. Also, had runny nose and an upset stomach. Thought it was allergies.   Then, about 10 days ago, took his BP and his pulse was low, in the 40's.   Has lost about 5 lbs through some changes in diet.     Sleeping 10 hours/night, but not feeling rested.   Last night slept from around 9:30pm to 7 am and he's sleepy again.     Had Covid in March 2020 and received vaccine (Moderna) in 2021.         Review Of Systems:  He has had some atypical upper respiratory symptoms such as a runny nose.  No fevers.  No sweats.  No chills.  No chest pain.  No shortness of breath.  No change in bowel or bladder.    Problem list per EMR:  Patient Active Problem List   Diagnosis     Insomnia     Dysthymia     Essential hypertension     Hyperlipidemia LDL goal <70     Transient global amnesia     Medicare annual wellness visit, subsequent     Overactive bladder       Current Outpatient Medications   Medication Sig Dispense Refill     amLODIPine (NORVASC) 10 MG tablet Take 1 tablet (10 mg) by mouth daily 90 tablet 4     atorvastatin (LIPITOR) 40 MG tablet TAKE 1.5 TABLETS BY MOUTH ONCE DAILY. 135 tablet 4     Calcium Malate POWD 500-1,000 mg daily.        "FLUoxetine (PROZAC) 20 MG capsule Take 1 capsule (20 mg) by mouth daily 90 capsule 4     Loratadine (CLARITIN PO) Take by mouth as needed       losartan (COZAAR) 100 MG tablet Take 1 tablet (100 mg) by mouth daily 90 tablet 4     Methylsulfonylmethane (MSM) 1000 MG CAPS Take 2,000 mg by mouth daily.        tamsulosin (FLOMAX) 0.4 MG capsule Take 1 capsule (0.4 mg) by mouth daily 90 capsule 3     zaleplon (SONATA) 5 MG capsule Take 3 capsules at bedtime (15 mg); may repeat x 1 during the night prn. May refill every 30 days. 120 capsule 0       No Known Allergies     Social:   Unchanged:       OBJECTIVE    Vitals: /76   Pulse 58   Temp 97.2  F (36.2  C)   Ht 1.695 m (5' 6.73\")   Wt 79.4 kg (175 lb 1.9 oz)   SpO2 97%   BMI 27.65 kg/m    BMI= Body mass index is 27.65 kg/m .  Nakul appears well and in no distress.  His skin has normal color to it.  His sclera appear normal.  He is able to stand easily from seated position.  He does not appear lightheaded.  His gait appears normal.    Neck is supple and without any lymphadenopathy    Cardiovascular is with a generally regular rhythm with an occasional irregular beat.  No murmurs or rubs are heard.   Lungs are clear to auscultation throughout    Abdomen is soft nontender.  Normal bowel sounds.      ECG with NSR at 61 bpm. Non-specific ST changes.       SEE TOP OF NOTE FOR ASSESSMENT AND PLAN  35 minutes spent on the date of the encounter doing chart review, history and exam, documentation and further activities as noted in the note.       --Neftaly Ferrera MD  [facility], Department of Family Medicine and Community Health  " [place]

## 2021-11-15 NOTE — PATIENT INSTRUCTIONS
ASSESSMENT/PLAN:      1. Nakul is a 75 yo who has been feeling ill for about the past month. His pulse at home was very low, in the 40's. Here it is in the normal range.    - Await labs  - Sent for 48 hour cardiac monitor  - Also, sent for Echocardiogram  - Encouraged to obtain a Covid test. This can be an in -home test if available at pharmacy and convenient. Otherwise, go to [facility].   - Minimize alcohol for a few nights until feeling better.   -Return to clinic as needed.       --Neftaly Ferrera MD

## 2021-11-16 ENCOUNTER — ANCILLARY PROCEDURE (OUTPATIENT)
Dept: CARDIOLOGY | Facility: CLINIC | Age: 76
End: 2021-11-16
Attending: FAMILY MEDICINE
Payer: MEDICARE

## 2021-11-16 DIAGNOSIS — R00.1 BRADYCARDIA: ICD-10-CM

## 2021-11-16 PROCEDURE — 93244 EXT ECG>48HR<7D REV&INTERPJ: CPT | Performed by: INTERNAL MEDICINE

## 2021-11-16 PROCEDURE — 93225 XTRNL ECG REC<48 HRS REC: CPT

## 2021-11-16 NOTE — PROGRESS NOTES
Per Dr. Ferrera, Neftaly Vitale, patient to have 48 hour Zio patch monitor placed.  Diagnosis: Bradycardia R00.1  Monitor placed: Yes  Patient Instructed: Yes  Patient verbalized understanding: Yes  Holter # [id number]    Placed by Maynor Victor

## 2021-11-22 ENCOUNTER — TELEPHONE (OUTPATIENT)
Dept: FAMILY MEDICINE | Facility: CLINIC | Age: 76
End: 2021-11-22
Payer: MEDICARE

## 2021-11-22 ENCOUNTER — HOSPITAL ENCOUNTER (OUTPATIENT)
Dept: CARDIOLOGY | Facility: CLINIC | Age: 76
Discharge: HOME | End: 2021-11-22
Attending: FAMILY MEDICINE | Admitting: FAMILY MEDICINE
Payer: MEDICARE

## 2021-11-22 DIAGNOSIS — R00.1 BRADYCARDIA: ICD-10-CM

## 2021-11-22 LAB — LVEF ECHO: NORMAL

## 2021-11-22 PROCEDURE — 93306 TTE W/DOPPLER COMPLETE: CPT

## 2021-11-22 PROCEDURE — 93306 TTE W/DOPPLER COMPLETE: CPT | Mod: 26 | Performed by: INTERNAL MEDICINE

## 2021-11-22 NOTE — TELEPHONE ENCOUNTER
Who is calling? Patient  Reason for Call: Recently done an echocardiogram and the results are normal. Was told by Maisha to make an appointment if there is any problems. However, Nakul will be leaving to [facility] for the winter next Monday. Wants to know the next steps are he needs take

## 2021-11-23 NOTE — TELEPHONE ENCOUNTER
The Echo looked normal to me. I reviewed it and sent him a message about 2 hours ago stating such.   No follow up needed based on that.   If he's still feeling ill, then, he should come in.   OK?   --Teodoro Cruz received this message from Dr Ferrera via [employer].  He is to call clinic prn.  Evelyn Mendez RN  [facility]

## 2021-11-26 ENCOUNTER — OFFICE VISIT (OUTPATIENT)
Dept: FAMILY MEDICINE | Facility: CLINIC | Age: 76
End: 2021-11-26
Payer: MEDICARE

## 2021-11-26 VITALS
SYSTOLIC BLOOD PRESSURE: 148 MMHG | DIASTOLIC BLOOD PRESSURE: 73 MMHG | TEMPERATURE: 96.4 F | HEART RATE: 66 BPM | WEIGHT: 175.25 LBS | BODY MASS INDEX: 27.51 KG/M2 | HEIGHT: 67 IN | OXYGEN SATURATION: 97 % | RESPIRATION RATE: 15 BRPM

## 2021-11-26 DIAGNOSIS — R73.09 ELEVATED GLUCOSE: Primary | ICD-10-CM

## 2021-11-26 DIAGNOSIS — R53.1 WEAKNESS GENERALIZED: ICD-10-CM

## 2021-11-26 LAB — HBA1C MFR BLD: 5.5 % (ref 0–5.6)

## 2021-11-26 ASSESSMENT — MIFFLIN-ST. JEOR: SCORE: 1479.59

## 2021-11-26 NOTE — NURSING NOTE
"76 year old  Chief Complaint   Patient presents with     Fatigue     low pulse and feeling weak,        Blood pressure (!) 148/73, pulse 66, temperature (!) 96.4  F (35.8  C), temperature source Skin, resp. rate 15, height 1.695 m (5' 6.75\"), weight 79.5 kg (175 lb 4 oz), SpO2 97 %. Body mass index is 27.65 kg/m .  Patient Active Problem List   Diagnosis     Insomnia     Dysthymia     Essential hypertension     Hyperlipidemia LDL goal <70     Transient global amnesia     Medicare annual wellness visit, subsequent     Overactive bladder       Wt Readings from Last 2 Encounters:   11/26/21 79.5 kg (175 lb 4 oz)   11/15/21 79.4 kg (175 lb 1.9 oz)     BP Readings from Last 3 Encounters:   11/26/21 (!) 148/73   11/15/21 132/76   08/23/21 137/78         Current Outpatient Medications   Medication     amLODIPine (NORVASC) 10 MG tablet     atorvastatin (LIPITOR) 40 MG tablet     Calcium Malate POWD     FLUoxetine (PROZAC) 20 MG capsule     Loratadine (CLARITIN PO)     losartan (COZAAR) 100 MG tablet     Methylsulfonylmethane (MSM) 1000 MG CAPS     tamsulosin (FLOMAX) 0.4 MG capsule     zaleplon (SONATA) 5 MG capsule     No current facility-administered medications for this visit.       Social History     Tobacco Use     Smoking status: Never Smoker     Smokeless tobacco: Never Used   Substance Use Topics     Alcohol use: Yes     Drug use: No       Health Maintenance Due   Topic Date Due     ADVANCE CARE PLANNING  Never done     COVID-19 Vaccine (3 - Booster for Moderna series) 09/01/2021     DTAP/TDAP/TD IMMUNIZATION (2 - Td or Tdap) 10/31/2021     PHQ-9  11/23/2021     ZOSTER IMMUNIZATION (2 of 2) 12/01/2021       No results found for: PAP      November 26, 2021 1:43 PM    "

## 2021-11-26 NOTE — PROGRESS NOTES
"OVERVIEW: Amari Eddy is a 76 year old male who presents to [facility] [place] [facility] today for Fatigue (low pulse and feeling weak, )        ASSESSMENT/PLAN:    Nakul is a 75 yo who is feeling weaker than usual.   1. Reviewed labs showing normal kidney function, liver function, thyoid function and \"complete blood cell counts.\" Also, normal cardiac function via the Echocardiogram. Also, today, checked for diabetes and that was normal.    2. Awaiting his 48 hour Holter monitor.   3. Offered a Chest X-ray, but Nakul and I felt that he should just establish care with a Cardiologist when down in [place] next week. He has a friend who knows a good Cardiologist in [place], [place] where he'll be until about April, 2022.     --Neftaly Ferrera MD      SUBJECTIVE:     Nakul is here with persistent feelings of weakness.   He's decreased his alcohol intake and is surprised that it has an effect. He sent a note stating that he's surprised that his heavy eyelids are \"virtually gone.\" And, \"not nearly as tired.\" Yet still feels only 50% back to normal.   Of note, he has had labs and an echocardiogram which were normal.  He also had a 48-hour cardiac monitor on him but the results have not yet returned.    Review Of Systems:    Has otherwise been in usual state of health, e.g.   Cardiovascular: negative  Respiratory: No shortness of breath, dyspnea on exertion, cough, or hemoptysis  Gastrointestinal: negative  Genitourinary: negative    Problem list per EMR:  Patient Active Problem List   Diagnosis     Insomnia     Dysthymia     Essential hypertension     Hyperlipidemia LDL goal <70     Transient global amnesia     Medicare annual wellness visit, subsequent     Overactive bladder       Current Outpatient Medications   Medication Sig Dispense Refill     amLODIPine (NORVASC) 10 MG tablet Take 1 tablet (10 mg) by mouth daily 90 tablet 4     atorvastatin (LIPITOR) 40 MG tablet TAKE 1.5 TABLETS BY MOUTH ONCE DAILY. 135 tablet 4     Calcium " "Malate POWD 500-1,000 mg daily.       FLUoxetine (PROZAC) 20 MG capsule Take 1 capsule (20 mg) by mouth daily 90 capsule 4     Loratadine (CLARITIN PO) Take by mouth as needed       losartan (COZAAR) 100 MG tablet Take 1 tablet (100 mg) by mouth daily 90 tablet 4     Methylsulfonylmethane (MSM) 1000 MG CAPS Take 2,000 mg by mouth daily.        tamsulosin (FLOMAX) 0.4 MG capsule Take 1 capsule (0.4 mg) by mouth daily 90 capsule 3     zaleplon (SONATA) 5 MG capsule Take 3 capsules at bedtime (15 mg); may repeat x 1 during the night prn. May refill every 30 days. 120 capsule 0       No Known Allergies     Social:   Unchanged.    OBJECTIVE    Vitals: BP (!) 148/73 (BP Location: Right arm, Patient Position: Sitting, Cuff Size: Adult Regular)   Pulse 66   Temp (!) 96.4  F (35.8  C) (Skin)   Resp 15   Ht 1.695 m (5' 6.75\")   Wt 79.5 kg (175 lb 4 oz)   SpO2 97%   BMI 27.65 kg/m    BMI= Body mass index is 27.65 kg/m .  He appears in his usual state of health.  He is a very articulate and pleasant 76-year-old.  Normal affect.  Normal gait.  Cardiovascular-regular rate and rhythm.  No murmurs.    Lungs-clear to auscultation  Extremities-no edema.    SEE TOP OF NOTE FOR ASSESSMENT AND PLAN    --Neftaly Ferrera MD  [facility], Department of Family Medicine and Community Health  "

## 2021-11-26 NOTE — PATIENT INSTRUCTIONS
"      ASSESSMENT/PLAN:    Nakul is a 77 yo who is feeling weaker than usual.   1. Reviewed labs showing normal kidney function, liver function, thyoid function and \"complete blood cell counts.\" Also, normal cardiac function via the Echocardiogram. Also, today, checked for diabetes and that was normal.    2. Awaiting his 48 hour Holter monitor.   3. Offered a Chest X-ray, but Nakul and I felt that he should just establish care with a Cardiologist when down in [place] next week. He has a friend who knows a good Cardiologist in [place], [place] where he'll be until about April, 2022.     --Neftaly Ferrera MD  "

## 2021-12-29 ENCOUNTER — MYC REFILL (OUTPATIENT)
Dept: FAMILY MEDICINE | Facility: CLINIC | Age: 76
End: 2021-12-29
Payer: MEDICARE

## 2021-12-29 DIAGNOSIS — F51.01 PRIMARY INSOMNIA: ICD-10-CM

## 2021-12-30 RX ORDER — ZALEPLON 5 MG/1
CAPSULE ORAL
Qty: 120 CAPSULE | Refills: 0 | Status: SHIPPED | OUTPATIENT
Start: 2021-12-30 | End: 2022-02-11

## 2021-12-30 NOTE — TELEPHONE ENCOUNTER
Zaleplon (Sonata) 5 mg    Last Office Visit: 11/26/21  Future [facility] Appointments: None  Medication last refilled: 10/28/21 #120 with 0 refill(s)     verified - last fill date: 10/31/21 #120    Routing refill request to provider for review/approval because:  Drug not on the [facility] refill protocol     Deidra Hay RN, BSN

## 2021-12-31 NOTE — TELEPHONE ENCOUNTER
One time med refill during Dr. Bo's absence.     Amari was seen today for refill request.    Diagnoses and all orders for this visit:    Primary insomnia  -     zaleplon (SONATA) 5 MG capsule; Take 3 capsules at bedtime (15 mg); may repeat x 1 during the night prn. May refill every 30 days.      Jorden Bauer MD  6:23 PM, December 30, 2021

## 2022-01-03 ENCOUNTER — TELEPHONE (OUTPATIENT)
Dept: FAMILY MEDICINE | Facility: CLINIC | Age: 77
End: 2022-01-03
Payer: MEDICARE

## 2022-01-03 NOTE — TELEPHONE ENCOUNTER
Prior Authorization Approval    Authorization Effective Date: 10/5/2021  Authorization Expiration Date: 1/3/2023  Medication: zaleplon (SONATA) 5 MG capsule-PA APPROVED   Approved Dose/Quantity:   Reference #:     Insurance Company: [employer] - Phone [phone] Fax [phone]  Expected CoPay:       CoPay Card Available:      Foundation Assistance Needed:    Which Pharmacy is filling the prescription (Not needed for infusion/clinic administered): [id number] [place] - [address] AT [facility]  Pharmacy Notified: Yes- **Instructed pharmacy to notify patient when script is ready to /ship.**  Patient Notified: Yes

## 2022-01-03 NOTE — TELEPHONE ENCOUNTER
Prior Authorization Retail Medication Request    Medication/Dose: zaleplon (SONATA) 5 MG capsule  ICD code (if different than what is on RX):    Previously Tried and Failed:    Rationale:      Insurance Name:    Insurance ID:        Pharmacy Information (if different than what is on RX)  Name:    Phone:      Mila Quiros MS RN-BC  01/03/22  11:51 AM

## 2022-01-03 NOTE — TELEPHONE ENCOUNTER
Central Prior Authorization Team   Phone: [phone]    PA Initiation    Medication: zaleplon (SONATA) 5 MG capsule  Insurance Company: [employer] - Phone [phone] Fax [phone]  Pharmacy Filling the Rx: [id number] - [place], [place] - [address] AT [facility]  Filling Pharmacy Phone: [phone]  Filling Pharmacy Fax: [phone]  Start Date: 1/3/2022    Initiate PA by phone at [phone] Case # [id number]

## 2022-01-13 ENCOUNTER — VIRTUAL VISIT (OUTPATIENT)
Dept: FAMILY MEDICINE | Facility: CLINIC | Age: 77
End: 2022-01-13
Payer: MEDICARE

## 2022-01-13 DIAGNOSIS — I49.3 FREQUENT PVCS: Primary | ICD-10-CM

## 2022-01-13 DIAGNOSIS — I10 ESSENTIAL HYPERTENSION: ICD-10-CM

## 2022-01-13 DIAGNOSIS — R53.83 OTHER FATIGUE: ICD-10-CM

## 2022-01-13 ASSESSMENT — ANXIETY QUESTIONNAIRES
GAD7 TOTAL SCORE: 0
5. BEING SO RESTLESS THAT IT IS HARD TO SIT STILL: NOT AT ALL
7. FEELING AFRAID AS IF SOMETHING AWFUL MIGHT HAPPEN: NOT AT ALL
3. WORRYING TOO MUCH ABOUT DIFFERENT THINGS: NOT AT ALL
2. NOT BEING ABLE TO STOP OR CONTROL WORRYING: NOT AT ALL
1. FEELING NERVOUS, ANXIOUS, OR ON EDGE: NOT AT ALL
7. FEELING AFRAID AS IF SOMETHING AWFUL MIGHT HAPPEN: NOT AT ALL
4. TROUBLE RELAXING: NOT AT ALL
GAD7 TOTAL SCORE: 0
6. BECOMING EASILY ANNOYED OR IRRITABLE: NOT AT ALL
GAD7 TOTAL SCORE: 0

## 2022-01-13 ASSESSMENT — PATIENT HEALTH QUESTIONNAIRE - PHQ9
SUM OF ALL RESPONSES TO PHQ QUESTIONS 1-9: 5
10. IF YOU CHECKED OFF ANY PROBLEMS, HOW DIFFICULT HAVE THESE PROBLEMS MADE IT FOR YOU TO DO YOUR WORK, TAKE CARE OF THINGS AT HOME, OR GET ALONG WITH OTHER PEOPLE: NOT DIFFICULT AT ALL
SUM OF ALL RESPONSES TO PHQ QUESTIONS 1-9: 5

## 2022-01-13 NOTE — PROGRESS NOTES
Nakul is a 77 year old who is being evaluated via a billable video visit.    Can you please confirm what state you are currently located in? [place]     How would you like to obtain your AVS? MyChart  If the video visit is dropped, the invitation should be resent by: Text to cell phone: mobile   Will anyone else be joining your video visit? No     Video Start Time: 11:38 AM   Video End Time: 11:57 AM  Total video time: 19 minutes    Total time spent with the patient in history gathering, chart review, counseling, placing orders and documtation was 23 minutes.      Assessment & Plan     1.  Frequent PVCs: Nakul has frequent PVCs noted in his ZioPatch monitor reading from November 2021, and knew that he had some skipped heartbeats prior to this as well. He is concerned about a potential cardiac issue given this history, his recent increased fatigue, and his home heart rate readings going up slightly. I have entered a referral to Dr. Iam Uriarte in EP cardiology for further evaluation of his symptoms.   2.  Other fatigue: As above.   3.  Essential hypertension: Nakul has a history of hypertension that has been well controlled with losartan 100 mg daily and amlodipine 10 mg daily. Referral to EP cardiology as above.    José Bo MD  [facility]    I, Natali Arellano, am serving as a scribe to document services personally performed by Dr. José Bo, based on data collection and the provider's statements to me. Dr. Bo has reviewed, edited, and approved the above note.       Subjective   Nakul is a 77 year old who presents for the following health issues. He is accompanied by his wife, Deb.    HPI     Nakul has a history of hypertension, for which he takes losartan 100 mg daily and amlodipine 10 mg daily. He tolerates these medications well. aNkul had a home BP cuff that he worried was inaccurate, and I recommended that he purchase on Omron brachial BP cuff during a previous visit.     Today, Nakul reports that his  "blood pressure has been \"sneaking up a little bit\". He does not feel like this is much of an issue of this point, but he is worried about his pulse. His pulse typically ranges from 45-55, but he has noticed some readings in the 60s over the past two weeks. Nakul is worried about his risk of having a cardiac event. He has felt fatigued and been laying down more frequently than normal. He has noticed that his golfing has been negatively impacted. Nakul had a 48 hour ZioPatch monitor completed on 11/15/21 and 11/16/21 that found frequent PVCs that were asymptomatic, average heart rate was 65 bpm. No other findings. His echocardiogram on 11/22/21 was normal. CMP, CBC with platelets differential, TSH with free T4 reflex, and hemoglobin A1c results were largely normal at that time as well. We discussed the results of these studies together and provided reassurance. I offered a referral to Dr. Iam Uriarte in EP cardiology, and Nakul would like to pursue this.     Nakul has received three doses of the Moderna COVID-19 vaccine, last dose administered in December 2021. He was recently exposed to COVID-19 after visiting his granddaughter, who later tested positive. He is planning on getting tested soon. He is currently asymptomatic.      Review of Systems   Constitutional, HEENT, cardiovascular, pulmonary, gi and gu systems are negative, except as otherwise noted.      Objective    Vitals - Patient Reported  Systolic (Patient Reported): (!) 155  Diastolic (Patient Reported): (!) 92  Pulse (Patient Reported): 45      Vitals:  No vitals were obtained today due to virtual visit.    Physical Exam   GENERAL: Healthy, alert and no distress  EYES: Eyes grossly normal to inspection.  No discharge or erythema, or obvious scleral/conjunctival abnormalities.  RESP: No audible wheeze, cough, or visible cyanosis.  No visible retractions or increased work of breathing.    SKIN: Visible skin clear. No significant rash, abnormal pigmentation or " lesions.  NEURO: Cranial nerves grossly intact.  Mentation and speech appropriate for age.  PSYCH: Mentation appears normal, affect normal/bright, judgement and insight intact, normal speech and appearance well-groomed.          Video-Visit Details    Type of service:  Video Visit    Video End Time:11:57 AM    Originating Location (pt. Location): Home    Distant Location (provider location):  [facility]     Platform used for Video Visit: [employer]     Answers for HPI/ROS submitted by the patient on 1/13/2022  If you checked off any problems, how difficult have these problems made it for you to do your work, take care of things at home, or get along with other people?: Not difficult at all  PHQ9 TOTAL SCORE: 5  EMMANUELLE 7 TOTAL SCORE: 0

## 2022-01-14 ASSESSMENT — ANXIETY QUESTIONNAIRES: GAD7 TOTAL SCORE: 0

## 2022-01-14 ASSESSMENT — PATIENT HEALTH QUESTIONNAIRE - PHQ9: SUM OF ALL RESPONSES TO PHQ QUESTIONS 1-9: 5

## 2022-02-22 ENCOUNTER — TRANSFERRED RECORDS (OUTPATIENT)
Dept: HEALTH INFORMATION MANAGEMENT | Facility: CLINIC | Age: 77
End: 2022-02-22
Payer: MEDICARE

## 2022-02-22 LAB
INR (EXTERNAL): 0.9 (ref 0.8–1.2)
TSH SERPL-ACNC: 1.51 MIU/L (ref 0.36–3.74)

## 2022-02-23 ENCOUNTER — TRANSFERRED RECORDS (OUTPATIENT)
Dept: HEALTH INFORMATION MANAGEMENT | Facility: CLINIC | Age: 77
End: 2022-02-23
Payer: MEDICARE

## 2022-02-23 LAB
CHOLESTEROL (EXTERNAL): 139 MG/DL
CREATININE (EXTERNAL): 1 MG/DL (ref 0.6–1.3)
EJECTION FRACTION: 52 %
EJECTION FRACTION: 52 %
GLUCOSE (EXTERNAL): 103 MG/DL (ref 70–99)
HDLC SERPL-MCNC: 71 MG/DL
LDL CHOLESTEROL (EXTERNAL): 53 MG/DL
POTASSIUM (EXTERNAL): 4 MMOL/L (ref 3.5–5.1)
TRIGLYCERIDES (EXTERNAL): 75 MG/DL

## 2022-02-24 LAB — EJECTION FRACTION: NORMAL %

## 2022-02-25 ENCOUNTER — TELEPHONE (OUTPATIENT)
Dept: FAMILY MEDICINE | Facility: CLINIC | Age: 77
End: 2022-02-25

## 2022-02-25 NOTE — TELEPHONE ENCOUNTER
"Who is calling? Dr. Bethea  Reason for Call: Requesting call back to discuss patient's hospitalization for fatigue at [facility] in [place], [place].       Called and spoke with Dr. Bethea regarding Nakul's recent hospital stay.  No bradycardia on monitoring, few PVC's, and a stress test was also normal.  Pacemaker was not recommended, even though Nakul said \"he knows he needs one\".      CT angiogram of chest revealed no embolism, but does have bilateral interstitial emphysema without infection of any kind.  Nakul given albuterol inhaler as needed.  Nakul declined a daily management inhaler.  Needs full pulmonary function studies and a pulmonary consult when he is here in [place].      Deidra Hay RN, BSN, [facility]  02/25/22  10:55 AM                  "

## 2022-02-28 ENCOUNTER — TELEPHONE (OUTPATIENT)
Dept: PULMONOLOGY | Facility: CLINIC | Age: 77
End: 2022-02-28
Payer: MEDICARE

## 2022-02-28 DIAGNOSIS — J43.9 EMPHYSEMA OF LUNG (H): Primary | ICD-10-CM

## 2022-02-28 NOTE — TELEPHONE ENCOUNTER
Pt is currently scheduled for NEW appt with Dr. Santoro THIS Thursday. Per Dr. Gonzalez, patient was recently hospitalized in [facility] and records need to be obtained prior to visit. Spoke with Naomi ([phone]) who states a written request needs to be faxed on letter head. Letter created and faxed with fax cover sheet to [phone]. Per Naomi, she will work on it tomorrow morning and overnight images/results. Will check in again tomorrow.

## 2022-02-28 NOTE — TELEPHONE ENCOUNTER
Spoke with pt regarding scheduling a new clinic as instructed by Dr. Iam Gonzalez. Opening with Dr. Santoro this Thursday. Will get records from [facility] in [place].

## 2022-03-01 ENCOUNTER — TELEPHONE (OUTPATIENT)
Dept: PULMONOLOGY | Facility: CLINIC | Age: 77
End: 2022-03-01
Payer: MEDICARE

## 2022-03-01 NOTE — TELEPHONE ENCOUNTER
Spoke with Naomi from [facility] who informed me she had just sent out hard copy of records and CD of chest imaging via FedEx to be overnighted to our facility. Tracking number provided.

## 2022-03-01 NOTE — TELEPHONE ENCOUNTER
RECORDS RECEIVED FROM: internal /external    DATE RECEIVED: 3.3.22    NOTES STATUS DETAILS   OFFICE NOTE from referring provider internal  Emphysema; per Dr. Gonzalez    OFFICE NOTE from other specialist External  patient was recently hospitalized in [facility] and records in [place]     DISCHARGE SUMMARY from hospital     DISCHARGE REPORT from the ER     MEDICATION LIST internal     IMAGING  (NEED IMAGES AND REPORTS)     CT SCAN External  2.23.22in PACS   CHEST XRAY (CXR) External  2.22.22 in PACS   TESTS     PULMONARY FUNCTION TESTING (PFT) internal  Scheduled 3.3.22        Action 3.1.22 sv   Action Taken Per appt notes- records from [facility] - Per Naomi, she will work on it tomorrow morning and overnight images/results. Will check in again tomorrow.   Records/image request sent    3.2.22sv- no records received yet, will be calling [facility] when they open     3.2.22 sv- received hospitalized records from [facility] per rosita Wolff coordinator -Records scanned into chart and CD images were dropped off to be uploaded to PACs

## 2022-03-02 ENCOUNTER — TELEPHONE (OUTPATIENT)
Dept: PULMONOLOGY | Facility: CLINIC | Age: 77
End: 2022-03-02
Payer: MEDICARE

## 2022-03-02 NOTE — TELEPHONE ENCOUNTER
Received the records from [facility] that was FedEx to the facility. Records scanned into chart and CD images were dropped off to be uploaded to PACs. Informed provider of the record arrival.

## 2022-03-03 ENCOUNTER — PRE VISIT (OUTPATIENT)
Dept: PULMONOLOGY | Facility: CLINIC | Age: 77
End: 2022-03-03

## 2022-03-03 ENCOUNTER — OFFICE VISIT (OUTPATIENT)
Dept: PULMONOLOGY | Facility: CLINIC | Age: 77
End: 2022-03-03
Attending: INTERNAL MEDICINE
Payer: MEDICARE

## 2022-03-03 VITALS
SYSTOLIC BLOOD PRESSURE: 164 MMHG | WEIGHT: 170 LBS | RESPIRATION RATE: 17 BRPM | OXYGEN SATURATION: 95 % | DIASTOLIC BLOOD PRESSURE: 72 MMHG | BODY MASS INDEX: 26.68 KG/M2 | HEIGHT: 67 IN | HEART RATE: 45 BPM

## 2022-03-03 DIAGNOSIS — J43.9 EMPHYSEMA OF LUNG (H): ICD-10-CM

## 2022-03-03 DIAGNOSIS — J43.1 PANLOBULAR EMPHYSEMA (H): Primary | ICD-10-CM

## 2022-03-03 PROCEDURE — 94726 PLETHYSMOGRAPHY LUNG VOLUMES: CPT | Performed by: INTERNAL MEDICINE

## 2022-03-03 PROCEDURE — 94375 RESPIRATORY FLOW VOLUME LOOP: CPT | Performed by: INTERNAL MEDICINE

## 2022-03-03 PROCEDURE — G0463 HOSPITAL OUTPT CLINIC VISIT: HCPCS | Mod: 25

## 2022-03-03 PROCEDURE — 94729 DIFFUSING CAPACITY: CPT | Performed by: INTERNAL MEDICINE

## 2022-03-03 PROCEDURE — 99205 OFFICE O/P NEW HI 60 MIN: CPT | Mod: 25 | Performed by: INTERNAL MEDICINE

## 2022-03-03 RX ORDER — PREDNISONE 50 MG/1
50 TABLET ORAL DAILY
Qty: 5 TABLET | Refills: 0 | Status: SHIPPED | OUTPATIENT
Start: 2022-03-03 | End: 2022-06-03

## 2022-03-03 RX ORDER — CETIRIZINE HYDROCHLORIDE 10 MG/1
10 TABLET ORAL DAILY
COMMUNITY
End: 2022-07-27

## 2022-03-03 RX ORDER — AZITHROMYCIN 250 MG/1
TABLET, FILM COATED ORAL
Qty: 6 TABLET | Refills: 0 | Status: SHIPPED | OUTPATIENT
Start: 2022-03-03 | End: 2022-03-08

## 2022-03-03 RX ORDER — IPRATROPIUM BROMIDE AND ALBUTEROL SULFATE 2.5; .5 MG/3ML; MG/3ML
1 SOLUTION RESPIRATORY (INHALATION) EVERY 6 HOURS PRN
Qty: 90 ML | Refills: 0 | Status: SHIPPED | OUTPATIENT
Start: 2022-03-03 | End: 2022-06-06

## 2022-03-03 RX ORDER — ALBUTEROL SULFATE 90 UG/1
2 AEROSOL, METERED RESPIRATORY (INHALATION) EVERY 6 HOURS
Qty: 18 G | Refills: 3 | Status: SHIPPED | OUTPATIENT
Start: 2022-03-03 | End: 2023-03-28

## 2022-03-03 ASSESSMENT — PAIN SCALES - GENERAL: PAINLEVEL: NO PAIN (0)

## 2022-03-03 NOTE — LETTER
"  3/3/2022     RE: Amari Eddy  [address]  [place] [address]    Dear Colleague,    Thank you for referring your patient, Amari Eddy, to the [facility] [place]. Please see a copy of my visit note below.    [facility]  Pulmonary Medicine  Visit Clinic Note  March 3, 2022    Dear patient. Thank you for visiting with me. I want you to feel respected, understood, and empowered. \"Respect\" is valuing you as much as I would a close family member. \"Empowerment\" happens when you are fully informed, and can make the best possible decision for you.  Please ask me questions!  Challenge anything that is not clear.       ASSESSMENT & PLAN     Patient is a 77 year old male who has been referred to pulmonary clinic for further management of his emphysema.     #Emphysema secondary to smoking GOLD class A  #Dyspnea on exertion  #Renal Cyst  - Patient has lung disease secondary to prior smoking. His symptoms are moderately controlled. I will start him on ANoro.   - I have also rpescribed albuterol prn as well as nebulizer prn.  -I also have Azithromycin and Prednisone as a back up when he needs it .-I have prescribed pulmonary rehab for his COPD which he will do when he comes back  -I had a face to face conversation for his Nebulizer. He will need duoneb every 6 hours as needed.   - He has some cough and possible secretions. We can dsicuss further management as well as ent referral if needed in future.     Nebulizer shipping address:   #  [address], [place], [place], [address] Nebulizer     #Renal cyst:   -I have sent a message to PCP to follow up on renal cyst.     #Dyspnea on exertion;   -His Copd could be contributing to it. Will treat as above. Also recommended to follow with cardiology as well.  Normal ECHo was noted.  Negative nuclear scan.     RTC in 3 months    64 minutes spent on the date of the encounter doing " chart review, history and exam, documentation and further activities as noted above.    These conclusions are made at the best of one's knowledge and belief based on the provided evidence such as patient's history and allergy test results and they can change over time or can be incomplete because of missing information's.    I explained the lab values, imagings and findings to the patient.  Patient expressed understanding I did not recognize any barriers to the understanding of the patient.    The above note was dictated using voice recognition software and may include typographical errors. Please contact the author for any clarifications.    Darryl Santoro MD   RN Coordinators: Ellen/Pito/Chioma: [phone]  Clinic Number: [phone]         Today's visit note:     Chief Complaint: Amari Eddy is a 77 year old year old male who is being seen for Consult (New Emphysema )      HPI:   Patient has been referred to pulmonary clinic for further management of his emphysema.     -He had an episode of bradycardia and fatigue and was admitted to [facility].  His Ct chest for PE at the time also showed an emphysema and he was referred to pulmonary clinic here.     Patient  Had been very fuction until 8 years ago.  As of now play golf 3/4 times a week. For past few months he feels that energy level is not as good as it should be.   -  He has hacking cough which  Feels like there are secretions in the airways.   - It also wakes him up at night. Has been wheezing. He has not used albuterol yet.  He feels that he has increased dyspnea with exertion as well. Able to do his daily activities but his stamina has definitely decreased over the years.     Social History:   -3 [employer] for 30 years. Total 75 pack years. Stopped smoking in 1990.   -Has couple of drink every day.          Medications:     Current Outpatient Medications   Medication     amLODIPine (NORVASC) 10 MG tablet     atorvastatin (LIPITOR) 40 MG tablet      "Calcium Malate POWD     FLUoxetine (PROZAC) 20 MG capsule     Loratadine (CLARITIN PO)     losartan (COZAAR) 100 MG tablet     Methylsulfonylmethane (MSM) 1000 MG CAPS     tamsulosin (FLOMAX) 0.4 MG capsule     zaleplon (SONATA) 5 MG capsule     No current facility-administered medications for this visit.            Review of Systems:       A complete 10 point review of systems was otherwise negative except as noted in the HPI.        PHYSICAL EXAM:  Ht 1.695 m (5' 6.75\")   Wt 77.1 kg (170 lb)   BMI 26.83 kg/m       General: Well developed, well nourished, No apparent distress  Eyes: Anicteric  Nose: Nasal mucosa with no edema or hyperemia.  No polyps  Ears: Hearing grossly normal  Mouth: Oral mucosa is moist, without any lesions. No oropharyngeal exudate.  Respiratory: Good air movement. No crackles. No rhonchi. No wheezes  Cardiac: RRR, normal S1, S2. No murmurs. No JVD  Abdomen: Soft, NT/ND  Musculoskeletal: Extremities normal. No clubbing. No cyanosis. No edema.  Skin: No rash on limited exam  Neuro: Normal mentation. Normal speech.  Psych:Normal affect           Data:   All laboratory and imaging data reviewed.      PFT:       PFT Interpretation:  I personally reviewed and interpreted the PFTs.   -Patient has moderate obstructive obstructive lung disease with normal diffusio ncapacity.       Chest CT: I personally reviewed and interpreted the CT scan.  -Emphysema is noted. No pulmonary nodules noted.     Recent Results (from the past 168 hour(s))   General PFT Lab (Please always keep checked)    Collection Time: 03/03/22  9:55 AM   Result Value Ref Range    FVC-Pred 3.56 L    FVC-Pre 2.71 L    FVC-%Pred-Pre 76 %    FEV1-Pre 1.79 L    FEV1-%Pred-Pre 66 %    FEV1FVC-Pred 76 %    FEV1FVC-Pre 66 %    FEFMax-Pred 6.88 L/sec    FEFMax-Pre 4.52 L/sec    FEFMax-%Pred-Pre 65 %    FEF2575-Pred 1.98 L/sec    FEF2575-Pre 1.19 L/sec    LDY9432-%Pred-Pre 60 %    ExpTime-Pre 6.70 sec    FIFMax-Pre 5.68 L/sec    VC-Pred 4.03 " L    VC-Pre 2.70 L    VC-%Pred-Pre 66 %    IC-Pred 3.26 L    IC-Pre 2.17 L    IC-%Pred-Pre 66 %    ERV-Pred 0.77 L    ERV-Pre 0.52 L    ERV-%Pred-Pre 68 %    FEV1FEV6-Pred 77 %    FEV1FEV6-Pre 67 %    FRCPleth-Pred 3.57 L    FRCPleth-Pre 4.46 L    FRCPleth-%Pred-Pre 124 %    RVPleth-Pred 2.69 L    RVPleth-Pre 3.94 L    RVPleth-%Pred-Pre 146 %    TLCPleth-Pred 6.47 L    TLCPleth-Pre 6.63 L    TLCPleth-%Pred-Pre 102 %    DLCOunc-Pred 22.42 ml/min/mmHg    DLCOunc-Pre 18.99 ml/min/mmHg    DLCOunc-%Pred-Pre 84 %    VA-Pre 4.31 L    VA-%Pred-Pre 76 %    FEV1SVC-Pred 66 %    FEV1SVC-Pre 66 %     Again, thank you for allowing me to participate in the care of your patient.      Sincerely,    Darryl Santoro MD

## 2022-03-03 NOTE — NURSING NOTE
Chief Complaint   Patient presents with     Consult     New Emphysema     Medications reviewed and vital signs taken.   Shane Sampson CMA

## 2022-03-03 NOTE — PATIENT INSTRUCTIONS
# If you have not heard from the scheduling office within 2 business days, please call [phone] for [facility]    #Use Anoro on daily basis    #Albuterol as needed    #Nebulzier will try to deliver to [facility]

## 2022-03-04 ENCOUNTER — MYC MEDICAL ADVICE (OUTPATIENT)
Dept: PULMONOLOGY | Facility: CLINIC | Age: 77
End: 2022-03-04
Payer: MEDICARE

## 2022-03-04 DIAGNOSIS — N28.1 RENAL CYST, LEFT: Primary | ICD-10-CM

## 2022-03-07 LAB
DLCOUNC-%PRED-PRE: 84 %
DLCOUNC-PRE: 18.99 ML/MIN/MMHG
DLCOUNC-PRED: 22.42 ML/MIN/MMHG
ERV-%PRED-PRE: 68 %
ERV-PRE: 0.52 L
ERV-PRED: 0.77 L
EXPTIME-PRE: 6.7 SEC
FEF2575-%PRED-PRE: 60 %
FEF2575-PRE: 1.19 L/SEC
FEF2575-PRED: 1.98 L/SEC
FEFMAX-%PRED-PRE: 65 %
FEFMAX-PRE: 4.52 L/SEC
FEFMAX-PRED: 6.88 L/SEC
FEV1-%PRED-PRE: 66 %
FEV1-PRE: 1.79 L
FEV1FEV6-PRE: 67 %
FEV1FEV6-PRED: 77 %
FEV1FVC-PRE: 66 %
FEV1FVC-PRED: 76 %
FEV1SVC-PRE: 66 %
FEV1SVC-PRED: 66 %
FIFMAX-PRE: 5.68 L/SEC
FRCPLETH-%PRED-PRE: 124 %
FRCPLETH-PRE: 4.46 L
FRCPLETH-PRED: 3.57 L
FVC-%PRED-PRE: 76 %
FVC-PRE: 2.71 L
FVC-PRED: 3.56 L
IC-%PRED-PRE: 66 %
IC-PRE: 2.17 L
IC-PRED: 3.26 L
RVPLETH-%PRED-PRE: 146 %
RVPLETH-PRE: 3.94 L
RVPLETH-PRED: 2.69 L
TLCPLETH-%PRED-PRE: 102 %
TLCPLETH-PRE: 6.63 L
TLCPLETH-PRED: 6.47 L
VA-%PRED-PRE: 76 %
VA-PRE: 4.31 L
VC-%PRED-PRE: 66 %
VC-PRE: 2.7 L
VC-PRED: 4.03 L

## 2022-03-08 NOTE — TELEPHONE ENCOUNTER
Flagged nebulizer order inn Epic with [facility]. They will call patient for shipment to preferred [place] address.

## 2022-03-15 ENCOUNTER — MYC MEDICAL ADVICE (OUTPATIENT)
Dept: PULMONOLOGY | Facility: CLINIC | Age: 77
End: 2022-03-15
Payer: MEDICARE

## 2022-03-22 NOTE — TELEPHONE ENCOUNTER
Provider called and spoke with patient, answering all questions in 3/15 MyChart message to patient's satisfaction.

## 2022-03-30 DIAGNOSIS — F51.01 PRIMARY INSOMNIA: ICD-10-CM

## 2022-03-31 RX ORDER — ZALEPLON 5 MG/1
CAPSULE ORAL
Qty: 120 CAPSULE | Refills: 0 | Status: SHIPPED | OUTPATIENT
Start: 2022-03-31 | End: 2022-04-30

## 2022-03-31 NOTE — TELEPHONE ENCOUNTER
Zaleplon (Sonata) 5 mg    Last Office Visit: 1/13/22  Future [facility] [place]ointments: None  Medication last refilled: 2/15/22 #120 with 0 refill(s)     verified - last fill date: 2/15/22 #120    Routing refill request to provider for review/approval because:  Drug not on the [facility] refill protocol     AUGUSTO StephensN, RN, CCM

## 2022-04-05 ENCOUNTER — TELEPHONE (OUTPATIENT)
Dept: PULMONOLOGY | Facility: CLINIC | Age: 77
End: 2022-04-05
Payer: MEDICARE

## 2022-04-27 ENCOUNTER — TELEPHONE (OUTPATIENT)
Dept: CARDIOLOGY | Facility: CLINIC | Age: 77
End: 2022-04-27
Payer: MEDICARE

## 2022-04-27 NOTE — TELEPHONE ENCOUNTER
----- Message from Meghana Jensen RN sent at 4/26/2022  3:34 PM CDT -----  Regarding: RE: Chapito ramirez  I'm sure you can utilitze one of the spots on 6/21, they are on hold for his reschedules. Otherwise his options are to do a virtual appointment with Dr Uriarte, wait until October, or see a different EP physician Dr Velasquez or Dr Aparicio.     Thanks  Meghana  ----- Message -----  From: Carmen Darnell  Sent: 4/26/2022   3:18 PM CDT  To: Cardiology Ep   Subject: Chapito ramirez                               Spoke with this patient to reschedule his new consult, next available in person went out to oct, patient has concerns with waiting that long, given the option for a VV but patient is hesitant as he has not been seen before. Wondering if someone could advise on rescheduling?

## 2022-05-20 ENCOUNTER — HOSPITAL ENCOUNTER (OUTPATIENT)
Dept: CARDIAC REHAB | Facility: CLINIC | Age: 77
Discharge: HOME | End: 2022-05-20
Attending: INTERNAL MEDICINE
Payer: MEDICARE

## 2022-05-20 PROCEDURE — 94626 PHY/QHP OP PULM RHB W/MNTR: CPT

## 2022-05-23 ENCOUNTER — HOSPITAL ENCOUNTER (OUTPATIENT)
Dept: CARDIAC REHAB | Facility: CLINIC | Age: 77
Discharge: HOME | End: 2022-05-23
Attending: INTERNAL MEDICINE
Payer: MEDICARE

## 2022-05-23 PROCEDURE — 94625 PHY/QHP OP PULM RHB W/O MNTR: CPT

## 2022-05-25 ENCOUNTER — HOSPITAL ENCOUNTER (OUTPATIENT)
Dept: CARDIAC REHAB | Facility: CLINIC | Age: 77
Discharge: HOME | End: 2022-05-25
Attending: INTERNAL MEDICINE
Payer: MEDICARE

## 2022-05-25 DIAGNOSIS — J43.1 PANLOBULAR EMPHYSEMA (H): ICD-10-CM

## 2022-05-25 PROCEDURE — 94625 PHY/QHP OP PULM RHB W/O MNTR: CPT

## 2022-06-01 ENCOUNTER — HOSPITAL ENCOUNTER (OUTPATIENT)
Dept: CARDIAC REHAB | Facility: CLINIC | Age: 77
Discharge: HOME | End: 2022-06-01
Attending: INTERNAL MEDICINE
Payer: MEDICARE

## 2022-06-01 PROCEDURE — 94625 PHY/QHP OP PULM RHB W/O MNTR: CPT

## 2022-06-03 ENCOUNTER — OFFICE VISIT (OUTPATIENT)
Dept: ALLERGY | Facility: CLINIC | Age: 77
End: 2022-06-03
Payer: MEDICARE

## 2022-06-03 VITALS
OXYGEN SATURATION: 96 % | DIASTOLIC BLOOD PRESSURE: 80 MMHG | WEIGHT: 169.9 LBS | HEART RATE: 55 BPM | SYSTOLIC BLOOD PRESSURE: 153 MMHG | BODY MASS INDEX: 26.81 KG/M2

## 2022-06-03 DIAGNOSIS — J43.1 PANLOBULAR EMPHYSEMA (H): ICD-10-CM

## 2022-06-03 DIAGNOSIS — F51.01 PRIMARY INSOMNIA: ICD-10-CM

## 2022-06-03 DIAGNOSIS — J30.1 SEASONAL ALLERGIC RHINITIS DUE TO POLLEN: Primary | ICD-10-CM

## 2022-06-03 PROCEDURE — 99204 OFFICE O/P NEW MOD 45 MIN: CPT | Mod: 25 | Performed by: INTERNAL MEDICINE

## 2022-06-03 PROCEDURE — 95004 PERQ TESTS W/ALRGNC XTRCS: CPT | Performed by: INTERNAL MEDICINE

## 2022-06-03 ASSESSMENT — ENCOUNTER SYMPTOMS
FEVER: 0
FATIGUE: 0
ARTHRALGIAS: 0
SINUS PRESSURE: 0
NAUSEA: 0
ACTIVITY CHANGE: 0
VOMITING: 0
EYE DISCHARGE: 0
JOINT SWELLING: 0
HEADACHES: 1
MYALGIAS: 0
RHINORRHEA: 0
CHEST TIGHTNESS: 0
FACIAL SWELLING: 0
SHORTNESS OF BREATH: 0
DIARRHEA: 0
COUGH: 1
EYE REDNESS: 0
WHEEZING: 1
EYE ITCHING: 1
ADENOPATHY: 0

## 2022-06-03 NOTE — TELEPHONE ENCOUNTER
Last visit was virtually on 01/13/2022, no future visits scheduled.  Medication last refilled on 05/04/2022 with 120 capsules and 0 refills.  Checked  - OK for refill  Routing refill request to provider for review/approval because:  Drug not on the [facility] refill protocol   Evelyn Mendez RN  [facility]

## 2022-06-03 NOTE — PROGRESS NOTES
Amari Eddy was seen in the Allergy Clinic at [facility].    Amari Eddy is a 77 year old White male being seen today at the request of Dr. Bo in consultation for Allergic rhinitis    Nakul was recently diagnosed with emphysema and he is here today to evaluate for allergies.  He also only wants to make sure that he is controlling any possible precipitating factors for his emphysema.  He was recently started on Anoro which he finds beneficial.  His FEV1 was 66% of predicted.  They did not do a postbronchodilator spirometry at that time.  Chest CT from [place] showed evidence of emphysema.    He does feel his symptoms do increase while in [place].  He does find Flonase and Zyrtec to provide some benefit.  His congestion and cough worsened while in [place].  He has not seen an allergist in [place].  He stopped smoking in 1990.    Allergy symptoms    The onset of symptoms: life long 50 years ago. Symptoms got worse in [place]   Admits having the following symptoms: congestion, post nasal drip, runny nose, sneezing, eye itching, cough, wheezing and headaches.    The symptoms are year round.  His triggers are: NA      Past Medical History:   Diagnosis Date     Emphysema, interstitial (H)     Diagnosed in [place]     History of concussion      Family History   Problem Relation Age of Onset     Cancer Mother      Alzheimer Disease Father      No past surgical history on file.    ENVIRONMENTAL HISTORY:   Pets inside the house include 1 dog(s).  Do you smoke cigarettes or other recreational drugs? No There is/are 0 smokers living in the house. The house does not have a damp basement.     SOCIAL HISTORY:   Amari is retired. He lives with his spouse.  His spouse is retired.    Review of Systems   Constitutional: Negative for activity change, fatigue and fever.   HENT: Positive for congestion, postnasal drip and sneezing. Negative for dental problem, ear pain, facial swelling, nosebleeds, rhinorrhea and  sinus pressure.    Eyes: Positive for itching. Negative for discharge and redness.   Respiratory: Positive for cough and wheezing. Negative for chest tightness and shortness of breath.    Cardiovascular: Negative for chest pain.   Gastrointestinal: Negative for diarrhea, nausea and vomiting.   Musculoskeletal: Negative for arthralgias, joint swelling and myalgias.   Skin: Negative for rash.   Neurological: Positive for headaches.   Hematological: Negative for adenopathy.   Psychiatric/Behavioral: Negative for behavioral problems and self-injury.         Current Outpatient Medications:      albuterol (PROAIR HFA/PROVENTIL HFA/VENTOLIN HFA) 108 (90 Base) MCG/ACT inhaler, Inhale 2 puffs into the lungs every 6 hours, Disp: 18 g, Rfl: 3     amLODIPine (NORVASC) 10 MG tablet, Take 1 tablet (10 mg) by mouth daily, Disp: 90 tablet, Rfl: 4     atorvastatin (LIPITOR) 40 MG tablet, TAKE 1.5 TABLETS BY MOUTH ONCE DAILY., Disp: 135 tablet, Rfl: 4     Calcium Malate POWD, 500-1,000 mg daily., Disp: , Rfl:      FLUoxetine (PROZAC) 20 MG capsule, Take 1 capsule (20 mg) by mouth daily, Disp: 90 capsule, Rfl: 4     losartan (COZAAR) 100 MG tablet, Take 1 tablet (100 mg) by mouth daily, Disp: 90 tablet, Rfl: 4     tamsulosin (FLOMAX) 0.4 MG capsule, Take 1 capsule (0.4 mg) by mouth daily, Disp: 90 capsule, Rfl: 3     umeclidinium-vilanterol (ANORO ELLIPTA) 62.5-25 MCG/INH oral inhaler, Inhale 1 puff into the lungs daily, Disp: 1 each, Rfl: 3     zaleplon (SONATA) 5 MG capsule, Take 3 capsules at bedtime (15 mg); may repeat x 1 during the night prn. May refill every 30 days., Disp: 120 capsule, Rfl: 0     cetirizine (ZYRTEC) 10 MG tablet, Take 10 mg by mouth daily (Patient not taking: Reported on 6/3/2022), Disp: , Rfl:      ipratropium - albuterol 0.5 mg/2.5 mg/3 mL (DUONEB) 0.5-2.5 (3) MG/3ML neb solution, Take 1 vial (3 mLs) by nebulization every 6 hours as needed for shortness of breath / dyspnea or wheezing, Disp: 90 mL, Rfl:  0  Allergies   Allergen Reactions     Seasonal Allergies          EXAM:   BP (!) 153/80   Pulse 55   Wt 77.1 kg (169 lb 14.4 oz)   SpO2 96%   BMI 26.81 kg/m      Physical Exam  Constitutional:       General: He is not in acute distress.     Appearance: Normal appearance. He is not ill-appearing.   HENT:      Head: Normocephalic and atraumatic.      Nose: Nose normal. No congestion or rhinorrhea.      Mouth/Throat:      Mouth: Mucous membranes are moist.      Pharynx: Oropharynx is clear. No posterior oropharyngeal erythema.   Eyes:      General:         Right eye: No discharge.         Left eye: No discharge.   Cardiovascular:      Rate and Rhythm: Normal rate and regular rhythm.      Heart sounds: Normal heart sounds.   Pulmonary:      Effort: Pulmonary effort is normal.      Breath sounds: Normal breath sounds. No wheezing or rhonchi.   Skin:     General: Skin is warm.      Findings: No erythema or rash.   Neurological:      General: No focal deficit present.      Mental Status: He is alert. Mental status is at baseline.   Psychiatric:         Mood and Affect: Mood normal.         Behavior: Behavior normal.     WORKUP: Skin testing was positive to to weeds as well as dog.  Skin test was applied and interpreted by Dr. Nicholas.    ASSESSMENT/PLAN:  Amari Eddy is a 77 year old male seen today for allergy evaluation.  He has a history of emphysema.  The main reason for the evaluation today is the patient wanted to determine if an allergic contribution may affect his emphysema in the future.    Seasonal allergic rhinitis due to pollen    1. Skin testing was positive to dog (borderline) as well as 2 weeds (including ragweed).  2. You may continue Zyrtec and Flonase.  3. I do not recommend allergy shots at this time.   4. He is not interested in any additional therapy.  Offered blood testing for the dog to determine if he is truly allergic.  He does not have symptoms around the dog and does not require  testing.    Panlobular emphysema (H)    Continue Anoro and working with Pulmonology.  Currently doing pulmonary rehab.  Offered spirometry at today's visit to see if there was any evidence of reversibility.  He is not interested.    Follow-up as needed.      Thank you for allowing me to participate in the care of Amari Harmonman.      A total of 45 minutes was spent on the day of the encounter performing chart review, history and exam, documentation, and counseling and coordination of care as noted above.       Logan Nicholas MD  Allergy/Immunology  [facility]

## 2022-06-03 NOTE — PROGRESS NOTES
Per provider verbal order, placed Adult Environmental Panel scratch test.  Consent was obtained prior to procedure.  Once panels were placed, patient was monitored for 15 minutes in clinic.  Provider read test after 15 minutes.  Pt tolerated procedure well.  All questions and concerns were addressed at office visit.     AUGUSTO SilvaN, RN

## 2022-06-03 NOTE — PATIENT INSTRUCTIONS
Skin testing was positive to dog (borderline) as well as 2 weeds (including ragweed).  You may continue Zyrtec and Flonase if finding these beneficial.  Continue Anoro and working with Pulmonology.  May consider Azelastine nasal spray if desiring additional medical therapy.  I do not recommend allergy shots at this time.

## 2022-06-03 NOTE — LETTER
6/3/2022         RE: Amari Eddy  [address]  [place] [address]        Dear Colleague,    Thank you for referring your patient, Amari Eddy, to the [facility] [place]. Please see a copy of my visit note below.    Amari Eddy was seen in the Allergy Clinic at [facility].    Amari Eddy is a 77 year old White male being seen today at the request of Dr. Bo in consultation for Allergic rhinitis    Nakul was recently diagnosed with emphysema and he is here today to evaluate for allergies.  He also only wants to make sure that he is controlling any possible precipitating factors for his emphysema.  He was recently started on Anoro which he finds beneficial.  His FEV1 was 66% of predicted.  They did not do a postbronchodilator spirometry at that time.  Chest CT from [place] showed evidence of emphysema.    He does feel his symptoms do increase while in [place].  He does find Flonase and Zyrtec to provide some benefit.  His congestion and cough worsened while in [place].  He has not seen an allergist in [place].  He stopped smoking in 1990.    Allergy symptoms    The onset of symptoms: life long 50 years ago. Symptoms got worse in [place]   Admits having the following symptoms: congestion, post nasal drip, runny nose, sneezing, eye itching, cough, wheezing and headaches.    The symptoms are year round.  His triggers are: NA      Past Medical History:   Diagnosis Date     Emphysema, interstitial (H)     Diagnosed in [place]     History of concussion      Family History   Problem Relation Age of Onset     Cancer Mother      Alzheimer Disease Father      No past surgical history on file.    ENVIRONMENTAL HISTORY:   Pets inside the house include 1 dog(s).  Do you smoke cigarettes or other recreational drugs? No There is/are 0 smokers living in the house. The house does not have a damp basement.     SOCIAL HISTORY:   Amari is retired. He lives with  his spouse.  His spouse is retired.    Review of Systems   Constitutional: Negative for activity change, fatigue and fever.   HENT: Positive for congestion, postnasal drip and sneezing. Negative for dental problem, ear pain, facial swelling, nosebleeds, rhinorrhea and sinus pressure.    Eyes: Positive for itching. Negative for discharge and redness.   Respiratory: Positive for cough and wheezing. Negative for chest tightness and shortness of breath.    Cardiovascular: Negative for chest pain.   Gastrointestinal: Negative for diarrhea, nausea and vomiting.   Musculoskeletal: Negative for arthralgias, joint swelling and myalgias.   Skin: Negative for rash.   Neurological: Positive for headaches.   Hematological: Negative for adenopathy.   Psychiatric/Behavioral: Negative for behavioral problems and self-injury.         Current Outpatient Medications:      albuterol (PROAIR HFA/PROVENTIL HFA/VENTOLIN HFA) 108 (90 Base) MCG/ACT inhaler, Inhale 2 puffs into the lungs every 6 hours, Disp: 18 g, Rfl: 3     amLODIPine (NORVASC) 10 MG tablet, Take 1 tablet (10 mg) by mouth daily, Disp: 90 tablet, Rfl: 4     atorvastatin (LIPITOR) 40 MG tablet, TAKE 1.5 TABLETS BY MOUTH ONCE DAILY., Disp: 135 tablet, Rfl: 4     Calcium Malate POWD, 500-1,000 mg daily., Disp: , Rfl:      FLUoxetine (PROZAC) 20 MG capsule, Take 1 capsule (20 mg) by mouth daily, Disp: 90 capsule, Rfl: 4     losartan (COZAAR) 100 MG tablet, Take 1 tablet (100 mg) by mouth daily, Disp: 90 tablet, Rfl: 4     tamsulosin (FLOMAX) 0.4 MG capsule, Take 1 capsule (0.4 mg) by mouth daily, Disp: 90 capsule, Rfl: 3     umeclidinium-vilanterol (ANORO ELLIPTA) 62.5-25 MCG/INH oral inhaler, Inhale 1 puff into the lungs daily, Disp: 1 each, Rfl: 3     zaleplon (SONATA) 5 MG capsule, Take 3 capsules at bedtime (15 mg); may repeat x 1 during the night prn. May refill every 30 days., Disp: 120 capsule, Rfl: 0     cetirizine (ZYRTEC) 10 MG tablet, Take 10 mg by mouth daily  (Patient not taking: Reported on 6/3/2022), Disp: , Rfl:      ipratropium - albuterol 0.5 mg/2.5 mg/3 mL (DUONEB) 0.5-2.5 (3) MG/3ML neb solution, Take 1 vial (3 mLs) by nebulization every 6 hours as needed for shortness of breath / dyspnea or wheezing, Disp: 90 mL, Rfl: 0  Allergies   Allergen Reactions     Seasonal Allergies          EXAM:   BP (!) 153/80   Pulse 55   Wt 77.1 kg (169 lb 14.4 oz)   SpO2 96%   BMI 26.81 kg/m      Physical Exam  Constitutional:       General: He is not in acute distress.     Appearance: Normal appearance. He is not ill-appearing.   HENT:      Head: Normocephalic and atraumatic.      Nose: Nose normal. No congestion or rhinorrhea.      Mouth/Throat:      Mouth: Mucous membranes are moist.      Pharynx: Oropharynx is clear. No posterior oropharyngeal erythema.   Eyes:      General:         Right eye: No discharge.         Left eye: No discharge.   Cardiovascular:      Rate and Rhythm: Normal rate and regular rhythm.      Heart sounds: Normal heart sounds.   Pulmonary:      Effort: Pulmonary effort is normal.      Breath sounds: Normal breath sounds. No wheezing or rhonchi.   Skin:     General: Skin is warm.      Findings: No erythema or rash.   Neurological:      General: No focal deficit present.      Mental Status: He is alert. Mental status is at baseline.   Psychiatric:         Mood and Affect: Mood normal.         Behavior: Behavior normal.     WORKUP: Skin testing was positive to to weeds as well as dog.  Skin test was applied and interpreted by Dr. Nicholas.    ASSESSMENT/PLAN:  Amari Eddy is a 77 year old male seen today for allergy evaluation.  He has a history of emphysema.  The main reason for the evaluation today is the patient wanted to determine if an allergic contribution may affect his emphysema in the future.    Seasonal allergic rhinitis due to pollen    1. Skin testing was positive to dog (borderline) as well as 2 weeds (including ragweed).  2. You may  continue Zyrtec and Flonase.  3. I do not recommend allergy shots at this time.   4. He is not interested in any additional therapy.  Offered blood testing for the dog to determine if he is truly allergic.  He does not have symptoms around the dog and does not require testing.    Panlobular emphysema (H)    Continue Anoro and working with Pulmonology.  Currently doing pulmonary rehab.  Offered spirometry at today's visit to see if there was any evidence of reversibility.  He is not interested.    Follow-up as needed.      Thank you for allowing me to participate in the care of Amari Eddy.      A total of 45 minutes was spent on the day of the encounter performing chart review, history and exam, documentation, and counseling and coordination of care as noted above.       Logan Nicholas MD  Allergy/Immunology  [facility]      Per provider verbal order, placed Adult Environmental Panel scratch test.  Consent was obtained prior to procedure.  Once panels were placed, patient was monitored for 15 minutes in clinic.  Provider read test after 15 minutes.  Pt tolerated procedure well.  All questions and concerns were addressed at office visit.     AUGUSTO SilvaN, RN                Again, thank you for allowing me to participate in the care of your patient.        Sincerely,        Logan Nicholas MD

## 2022-06-06 ENCOUNTER — HOSPITAL ENCOUNTER (OUTPATIENT)
Dept: CARDIAC REHAB | Facility: CLINIC | Age: 77
Discharge: HOME | End: 2022-06-06
Attending: INTERNAL MEDICINE
Payer: MEDICARE

## 2022-06-06 DIAGNOSIS — J43.1 PANLOBULAR EMPHYSEMA (H): ICD-10-CM

## 2022-06-06 PROCEDURE — 94625 PHY/QHP OP PULM RHB W/O MNTR: CPT

## 2022-06-06 RX ORDER — IPRATROPIUM BROMIDE AND ALBUTEROL SULFATE 2.5; .5 MG/3ML; MG/3ML
1 SOLUTION RESPIRATORY (INHALATION) EVERY 6 HOURS PRN
Qty: 90 ML | Refills: 9 | Status: SHIPPED | OUTPATIENT
Start: 2022-06-06

## 2022-06-06 RX ORDER — ZALEPLON 5 MG/1
CAPSULE ORAL
Qty: 120 CAPSULE | Refills: 0 | Status: SHIPPED | OUTPATIENT
Start: 2022-06-06 | End: 2022-07-27

## 2022-06-08 ENCOUNTER — HOSPITAL ENCOUNTER (OUTPATIENT)
Dept: CARDIAC REHAB | Facility: CLINIC | Age: 77
Discharge: HOME | End: 2022-06-08
Attending: INTERNAL MEDICINE
Payer: MEDICARE

## 2022-06-08 PROCEDURE — 94625 PHY/QHP OP PULM RHB W/O MNTR: CPT

## 2022-06-13 ENCOUNTER — HOSPITAL ENCOUNTER (OUTPATIENT)
Dept: CARDIAC REHAB | Facility: CLINIC | Age: 77
Discharge: HOME | End: 2022-06-13
Attending: INTERNAL MEDICINE
Payer: MEDICARE

## 2022-06-13 PROCEDURE — 94625 PHY/QHP OP PULM RHB W/O MNTR: CPT

## 2022-06-16 ENCOUNTER — OFFICE VISIT (OUTPATIENT)
Dept: PULMONOLOGY | Facility: CLINIC | Age: 77
End: 2022-06-16
Attending: INTERNAL MEDICINE
Payer: MEDICARE

## 2022-06-16 VITALS
SYSTOLIC BLOOD PRESSURE: 148 MMHG | HEART RATE: 62 BPM | DIASTOLIC BLOOD PRESSURE: 72 MMHG | BODY MASS INDEX: 27.16 KG/M2 | WEIGHT: 169 LBS | OXYGEN SATURATION: 91 % | HEIGHT: 66 IN

## 2022-06-16 DIAGNOSIS — J43.9 PULMONARY EMPHYSEMA, UNSPECIFIED EMPHYSEMA TYPE (H): Primary | ICD-10-CM

## 2022-06-16 PROCEDURE — G0463 HOSPITAL OUTPT CLINIC VISIT: HCPCS

## 2022-06-16 PROCEDURE — 99213 OFFICE O/P EST LOW 20 MIN: CPT | Performed by: INTERNAL MEDICINE

## 2022-06-16 ASSESSMENT — PAIN SCALES - GENERAL: PAINLEVEL: NO PAIN (0)

## 2022-06-16 NOTE — LETTER
"    6/16/2022         RE: Amari Eddy  [address]  [place] [facility] [address]        Dear Colleague,    Thank you for referring your patient, Amari Eddy, to the [facility] [place]. Please see a copy of my visit note below.    [facility]  Pulmonary Medicine  Visit Clinic Note  March 3, 2022    Dear patient. Thank you for visiting with me. I want you to feel respected, understood, and empowered. \"Respect\" is valuing you as much as I would a close family member. \"Empowerment\" happens when you are fully informed, and can make the best possible decision for you.  Please ask me questions!  Challenge anything that is not clear.       ASSESSMENT & PLAN     Patient is a 77 year old male who has been referred to pulmonary clinic for further management of his emphysema.     #Emphysema secondary to smoking GOLD class A  #Dyspnea on exertion  #Renal Cyst  - Patient has lung disease secondary to prior smoking. His symptoms have responded well to anoro.   -Continue with inhaler.   - I have also prescribed albuterol prn as well as nebulizer prn.  -I also have Azithromycin and Prednisone as a back up when he needs it   -He is doing his pulmonary rehab.   -I had a face to face conversation for his Nebulizer. He will need duoneb every 6 hours as needed.   - He has some cough and possible secretions. We can dsicuss further management as well as ent referral if needed in future.     #Renal cyst:   -I have sent a message to PCP to follow up on renal cyst.  An ultrasound is scheduled.     #Dyspnea on exertion;   -His Copd could be contributing to it. Will treat as above. Also recommended to follow with cardiology as well.  Normal ECHo was noted.  Negative nuclear scan.     RTC in 12 months    22 minutes spent on the date of the encounter doing chart review, history and exam, documentation and further activities as noted above.    These " conclusions are made at the best of one's knowledge and belief based on the provided evidence such as patient's history and allergy test results and they can change over time or can be incomplete because of missing information's.    I explained the lab values, imagings and findings to the patient.  Patient expressed understanding I did not recognize any barriers to the understanding of the patient.    The above note was dictated using voice recognition software and may include typographical errors. Please contact the author for any clarifications.    Darryl Santoro MD   RN Coordinators: Ellen/Pito/Chioma: [phone]  Clinic Number: [phone]         Today's visit note:     Chief Complaint: Amari Eddy is a 77 year old year old male who is being seen for Follow Up (3 month follow up )      HPI:   Patient has been referred to pulmonary clinic for further management of his emphysema.     -He had an episode of bradycardia and fatigue and was admitted to [facility].  His Ct chest for PE at the time also showed an emphysema and he was referred to pulmonary clinic here.     Patient  Had been very fuction until 8 years ago.  As of now play golf 3/4 times a week. For past few months he feels that energy level is not as good as it should be.   -  He has hacking cough which  Feels like there are secretions in the airways.   - It also wakes him up at night. Has been wheezing. He has not used albuterol yet.  He feels that he has increased dyspnea with exertion as well. Able to do his daily activities but his stamina has definitely decreased over the years.     Social History:   -3 PPD for 30 years. Total 75 pack years. Stopped smoking in 1990.   -Has couple of drink every day.     #Interval History: 6/16/22  -  Stable. No major worsening. Has been doing pulmonary rehab.  NO need for any rescue inahler.          Medications:     Current Outpatient Medications   Medication     albuterol (PROAIR HFA/PROVENTIL  "HFA/VENTOLIN HFA) 108 (90 Base) MCG/ACT inhaler     amLODIPine (NORVASC) 10 MG tablet     atorvastatin (LIPITOR) 40 MG tablet     Calcium Malate POWD     FLUoxetine (PROZAC) 20 MG capsule     ipratropium - albuterol 0.5 mg/2.5 mg/3 mL (DUONEB) 0.5-2.5 (3) MG/3ML neb solution     losartan (COZAAR) 100 MG tablet     tamsulosin (FLOMAX) 0.4 MG capsule     umeclidinium-vilanterol (ANORO ELLIPTA) 62.5-25 MCG/INH oral inhaler     zaleplon (SONATA) 5 MG capsule     cetirizine (ZYRTEC) 10 MG tablet     No current facility-administered medications for this visit.            Review of Systems:       A complete 10 point review of systems was otherwise negative except as noted in the HPI.        PHYSICAL EXAM:  BP (!) 148/72 (BP Location: Right arm, Patient Position: Chair)   Pulse 62   Ht 1.676 m (5' 6\")   Wt 76.7 kg (169 lb)   SpO2 91%   BMI 27.28 kg/m       General: Well developed, well nourished, No apparent distress  Eyes: Anicteric  Nose: Nasal mucosa with no edema or hyperemia.  No polyps  Ears: Hearing grossly normal  Mouth: Oral mucosa is moist, without any lesions. No oropharyngeal exudate.  Respiratory: Good air movement. No crackles. No rhonchi. No wheezes  Cardiac: RRR, normal S1, S2. No murmurs. No JVD  Abdomen: Soft, NT/ND  Musculoskeletal: Extremities normal. No clubbing. No cyanosis. No edema.  Skin: No rash on limited exam  Neuro: Normal mentation. Normal speech.  Psych:Normal affect           Data:   All laboratory and imaging data reviewed.      PFT:       PFT Interpretation:  I personally reviewed and interpreted the PFTs.   -Patient has moderate obstructive obstructive lung disease with normal diffusio ncapacity.       Chest CT: I personally reviewed and interpreted the CT scan.  -Emphysema is noted. No pulmonary nodules noted.     No results found for this or any previous visit (from the past 168 hour(s)).                Again, thank you for allowing me to participate in the care of your patient.  "       Sincerely,        Darryl Santoro MD

## 2022-06-16 NOTE — PROGRESS NOTES
"[facility]  Pulmonary Medicine  Visit Clinic Note      Dear patient. Thank you for visiting with me. I want you to feel respected, understood, and empowered. \"Respect\" is valuing you as much as I would a close family member. \"Empowerment\" happens when you are fully informed, and can make the best possible decision for you.  Please ask me questions!  Challenge anything that is not clear.       ASSESSMENT & PLAN     Patient is a 77 year old male who has been referred to pulmonary clinic for further management of his emphysema.     #Emphysema secondary to smoking GOLD class A  #Dyspnea on exertion  #Renal Cyst  - Patient has lung disease secondary to prior smoking. His symptoms have responded well to anoro.   -Continue with inhaler.   - I have also prescribed albuterol prn as well as nebulizer prn.  -I also have Azithromycin and Prednisone as a back up when he needs it   -He is doing his pulmonary rehab.   -I had a face to face conversation for his Nebulizer. He will need duoneb every 6 hours as needed.   - He has some cough and possible secretions. We can dsicuss further management as well as ent referral if needed in future.     #Renal cyst:   -I have sent a message to PCP to follow up on renal cyst.  An ultrasound is scheduled.     #Dyspnea on exertion;   -His Copd could be contributing to it. Will treat as above. Also recommended to follow with cardiology as well.  Normal ECHo was noted.  Negative nuclear scan.     RTC in 12 months    22 minutes spent on the date of the encounter doing chart review, history and exam, documentation and further activities as noted above.    These conclusions are made at the best of one's knowledge and belief based on the provided evidence such as patient's history and allergy test results and they can change over time or can be incomplete because of missing information's.    I explained the lab values, imagings and findings to the patient.  Patient expressed " understanding I did not recognize any barriers to the understanding of the patient.    The above note was dictated using voice recognition software and may include typographical errors. Please contact the author for any clarifications.    Darryl Santoro MD   RN Coordinators: Ellen/Pito/Chioma: [phone]  Clinic Number: [phone]         Today's visit note:     Chief Complaint: Amari Eddy is a 77 year old year old male who is being seen for Follow Up (3 month follow up )      HPI:   Patient has been referred to pulmonary clinic for further management of his emphysema.     -He had an episode of bradycardia and fatigue and was admitted to [facility].  His Ct chest for PE at the time also showed an emphysema and he was referred to pulmonary clinic here.     Patient  Had been very fuction until 8 years ago.  As of now play golf 3/4 times a week. For past few months he feels that energy level is not as good as it should be.   -  He has hacking cough which  Feels like there are secretions in the airways.   - It also wakes him up at night. Has been wheezing. He has not used albuterol yet.  He feels that he has increased dyspnea with exertion as well. Able to do his daily activities but his stamina has definitely decreased over the years.     Social History:   -3 PPD for 30 years. Total 75 pack years. Stopped smoking in 1990.   -Has couple of drink every day.     #Interval History: 6/16/22  -  Stable. No major worsening. Has been doing pulmonary rehab.  NO need for any rescue inahler.          Medications:     Current Outpatient Medications   Medication     albuterol (PROAIR HFA/PROVENTIL HFA/VENTOLIN HFA) 108 (90 Base) MCG/ACT inhaler     amLODIPine (NORVASC) 10 MG tablet     atorvastatin (LIPITOR) 40 MG tablet     Calcium Malate POWD     FLUoxetine (PROZAC) 20 MG capsule     ipratropium - albuterol 0.5 mg/2.5 mg/3 mL (DUONEB) 0.5-2.5 (3) MG/3ML neb solution     losartan (COZAAR) 100 MG tablet      "tamsulosin (FLOMAX) 0.4 MG capsule     umeclidinium-vilanterol (ANORO ELLIPTA) 62.5-25 MCG/INH oral inhaler     zaleplon (SONATA) 5 MG capsule     cetirizine (ZYRTEC) 10 MG tablet     No current facility-administered medications for this visit.            Review of Systems:       A complete 10 point review of systems was otherwise negative except as noted in the HPI.        PHYSICAL EXAM:  BP (!) 148/72 (BP Location: Right arm, Patient Position: Chair)   Pulse 62   Ht 1.676 m (5' 6\")   Wt 76.7 kg (169 lb)   SpO2 91%   BMI 27.28 kg/m       General: Well developed, well nourished, No apparent distress  Eyes: Anicteric  Nose: Nasal mucosa with no edema or hyperemia.  No polyps  Ears: Hearing grossly normal  Mouth: Oral mucosa is moist, without any lesions. No oropharyngeal exudate.  Respiratory: Good air movement. No crackles. No rhonchi. No wheezes  Cardiac: RRR, normal S1, S2. No murmurs. No JVD  Abdomen: Soft, NT/ND  Musculoskeletal: Extremities normal. No clubbing. No cyanosis. No edema.  Skin: No rash on limited exam  Neuro: Normal mentation. Normal speech.  Psych:Normal affect           Data:   All laboratory and imaging data reviewed.      PFT:       PFT Interpretation:  I personally reviewed and interpreted the PFTs.   -Patient has moderate obstructive obstructive lung disease with normal diffusio ncapacity.       Chest CT: I personally reviewed and interpreted the CT scan.  -Emphysema is noted. No pulmonary nodules noted.     No results found for this or any previous visit (from the past 168 hour(s)).                                    "

## 2022-06-16 NOTE — NURSING NOTE
Chief Complaint   Patient presents with     Follow Up     3 month follow up      Vitals were taken and medications were reconciled.    Mary Moore RMA  4:30 PM

## 2022-06-29 DIAGNOSIS — J43.1 PANLOBULAR EMPHYSEMA (H): ICD-10-CM

## 2022-07-27 ENCOUNTER — TELEPHONE (OUTPATIENT)
Dept: FAMILY MEDICINE | Facility: CLINIC | Age: 77
End: 2022-07-27

## 2022-07-27 DIAGNOSIS — F51.01 PRIMARY INSOMNIA: ICD-10-CM

## 2022-07-27 RX ORDER — ZALEPLON 5 MG/1
CAPSULE ORAL
Qty: 120 CAPSULE | Refills: 0 | Status: SHIPPED | OUTPATIENT
Start: 2022-07-27 | End: 2022-09-06

## 2022-07-27 RX ORDER — ZALEPLON 5 MG/1
CAPSULE ORAL
Qty: 120 CAPSULE | Refills: 0 | Status: CANCELLED | OUTPATIENT
Start: 2022-07-27

## 2022-07-27 NOTE — TELEPHONE ENCOUNTER
Med refilled while PCP, Dr. Bo, is away from clinic.    Amari was seen today for refill request.    Diagnoses and all orders for this visit:    Primary insomnia  -     zaleplon (SONATA) 5 MG capsule; Take 3 capsules at bedtime (15 mg); may repeat x 1 during the night prn. May refill every 30 days.      Jorden Bauer MD  1:07 PM, July 27, 2022

## 2022-07-27 NOTE — TELEPHONE ENCOUNTER
Zaleplon (Sonata) 5 mg    Last Office Visit: 1/13/22  Future [facility] Appointments: None  Medication last refilled: 6/6/22 #120 with 0 refill(s)     verified - last fill date: 6/8/22 #120    Routing refill request to provider for review/approval because:  Drug not on the [facility] refill protocol     AUGUSTO StephensN, RN, CCM

## 2022-07-30 ENCOUNTER — HEALTH MAINTENANCE LETTER (OUTPATIENT)
Age: 77
End: 2022-07-30

## 2022-08-03 ENCOUNTER — TRANSFERRED RECORDS (OUTPATIENT)
Dept: HEALTH INFORMATION MANAGEMENT | Facility: CLINIC | Age: 77
End: 2022-08-03

## 2022-08-03 LAB
ALT SERPL-CCNC: 39 U/L (ref 0–55)
AST SERPL-CCNC: 51 U/L (ref 9–40)
CREATININE (EXTERNAL): 1.2 MG/DL (ref 0.77–1.35)
GFR ESTIMATED (EXTERNAL): 58 ML/MIN/1.73M2
GLUCOSE (EXTERNAL): 105 MG/DL (ref 65–99)
POTASSIUM (EXTERNAL): 3.7 MMOL/L (ref 3.5–5)

## 2022-08-05 ENCOUNTER — NURSE TRIAGE (OUTPATIENT)
Dept: FAMILY MEDICINE | Facility: CLINIC | Age: 77
End: 2022-08-05

## 2022-08-05 NOTE — TELEPHONE ENCOUNTER
Who is calling? Patient  Reason for Call:loss consciousness, fell down and hit head, couple hours of disorientation--happened several days ago    Ana

## 2022-08-08 ENCOUNTER — OFFICE VISIT (OUTPATIENT)
Dept: FAMILY MEDICINE | Facility: CLINIC | Age: 77
End: 2022-08-08
Payer: MEDICARE

## 2022-08-08 ENCOUNTER — TRANSFERRED RECORDS (OUTPATIENT)
Dept: HEALTH INFORMATION MANAGEMENT | Facility: CLINIC | Age: 77
End: 2022-08-08

## 2022-08-08 VITALS
TEMPERATURE: 97.3 F | HEIGHT: 66 IN | DIASTOLIC BLOOD PRESSURE: 78 MMHG | HEART RATE: 58 BPM | WEIGHT: 171 LBS | SYSTOLIC BLOOD PRESSURE: 140 MMHG | BODY MASS INDEX: 27.48 KG/M2 | OXYGEN SATURATION: 93 %

## 2022-08-08 DIAGNOSIS — R73.09 ELEVATED GLUCOSE: ICD-10-CM

## 2022-08-08 DIAGNOSIS — N40.1 BENIGN PROSTATIC HYPERPLASIA WITH URINARY FREQUENCY: ICD-10-CM

## 2022-08-08 DIAGNOSIS — R35.0 BENIGN PROSTATIC HYPERPLASIA WITH URINARY FREQUENCY: ICD-10-CM

## 2022-08-08 DIAGNOSIS — E78.5 HYPERLIPIDEMIA LDL GOAL <70: ICD-10-CM

## 2022-08-08 DIAGNOSIS — R42 DIZZINESS: Primary | ICD-10-CM

## 2022-08-08 DIAGNOSIS — N28.1 RENAL CYST, LEFT: ICD-10-CM

## 2022-08-08 LAB
ANION GAP SERPL CALCULATED.3IONS-SCNC: 12 MMOL/L (ref 7–15)
BUN SERPL-MCNC: 24.5 MG/DL (ref 8–23)
CALCIUM SERPL-MCNC: 9 MG/DL (ref 8.8–10.2)
CHLORIDE SERPL-SCNC: 102 MMOL/L (ref 98–107)
CHOLEST SERPL-MCNC: 186 MG/DL
CREAT SERPL-MCNC: 1.06 MG/DL (ref 0.67–1.17)
DEPRECATED HCO3 PLAS-SCNC: 25 MMOL/L (ref 22–29)
GFR SERPL CREATININE-BSD FRML MDRD: 72 ML/MIN/1.73M2
GLUCOSE SERPL-MCNC: 89 MG/DL (ref 70–99)
HBA1C MFR BLD: 5.4 % (ref 0–5.6)
HDLC SERPL-MCNC: 88 MG/DL
LDLC SERPL CALC-MCNC: 70 MG/DL
NONHDLC SERPL-MCNC: 98 MG/DL
POTASSIUM SERPL-SCNC: 4.3 MMOL/L (ref 3.4–5.3)
PSA SERPL-MCNC: 2.61 NG/ML (ref 0–6.5)
SODIUM SERPL-SCNC: 139 MMOL/L (ref 136–145)
TRIGL SERPL-MCNC: 139 MG/DL

## 2022-08-08 PROCEDURE — 80048 BASIC METABOLIC PNL TOTAL CA: CPT | Performed by: FAMILY MEDICINE

## 2022-08-08 PROCEDURE — G0103 PSA SCREENING: HCPCS | Performed by: FAMILY MEDICINE

## 2022-08-08 PROCEDURE — 84153 ASSAY OF PSA TOTAL: CPT | Performed by: FAMILY MEDICINE

## 2022-08-08 PROCEDURE — 80061 LIPID PANEL: CPT | Performed by: FAMILY MEDICINE

## 2022-08-08 RX ORDER — TAMSULOSIN HYDROCHLORIDE 0.4 MG/1
0.8 CAPSULE ORAL DAILY
Qty: 100 CAPSULE | Refills: 3 | Status: SHIPPED | OUTPATIENT
Start: 2022-08-08 | End: 2023-02-23

## 2022-08-08 RX ORDER — ATORVASTATIN CALCIUM 40 MG/1
TABLET, FILM COATED ORAL
Qty: 135 TABLET | Refills: 4 | Status: SHIPPED | OUTPATIENT
Start: 2022-08-08 | End: 2023-08-11

## 2022-08-08 ASSESSMENT — PATIENT HEALTH QUESTIONNAIRE - PHQ9
SUM OF ALL RESPONSES TO PHQ QUESTIONS 1-9: 1
5. POOR APPETITE OR OVEREATING: NOT AT ALL

## 2022-08-08 ASSESSMENT — ANXIETY QUESTIONNAIRES
IF YOU CHECKED OFF ANY PROBLEMS ON THIS QUESTIONNAIRE, HOW DIFFICULT HAVE THESE PROBLEMS MADE IT FOR YOU TO DO YOUR WORK, TAKE CARE OF THINGS AT HOME, OR GET ALONG WITH OTHER PEOPLE: NOT DIFFICULT AT ALL
GAD7 TOTAL SCORE: 0
7. FEELING AFRAID AS IF SOMETHING AWFUL MIGHT HAPPEN: NOT AT ALL
2. NOT BEING ABLE TO STOP OR CONTROL WORRYING: NOT AT ALL
GAD7 TOTAL SCORE: 0
3. WORRYING TOO MUCH ABOUT DIFFERENT THINGS: NOT AT ALL
5. BEING SO RESTLESS THAT IT IS HARD TO SIT STILL: NOT AT ALL
1. FEELING NERVOUS, ANXIOUS, OR ON EDGE: NOT AT ALL
6. BECOMING EASILY ANNOYED OR IRRITABLE: NOT AT ALL

## 2022-08-08 NOTE — PATIENT INSTRUCTIONS
"  ASSESSMENT/PLAN:    Dizziness with a fall in [place] that was felt by their E.R. to be from Emphysema combined with high altitude (O2sat from 88-91% in hospital)  - Nakul has been instructed on transition to high altitude. Discussed avoidance of alcohol.    - Referred to Neurology  -Also, for the breathing and emphysema, discussed the book, \"Breath\" by Ethan Reaves. Discussed that there are large exaggerations in the book. Nakul is a fan of nasal breathing.  Also, try Coenzyme Q, 30-60 mg/day as on a Statin. Discussed that evidence for this is not solid.     2. Arthritis  -Try glucosamine 1500mg /day  - Look into taking Tumeric  - Try eliminating foods, e.g. gluten and noting whether any difference.  - Stay active and avoid weight gain.     3. Left renal cortical cyst seen as an incidental finding on chest CT scan  - Re-ordered Ultrasound. May need referral depending upon results.     4. Having recurrent of urinary urges. This was helped with Flomax at 0.4 mg/day.   - Will try increasing to 0.8 mg/day. Warned of lightheadedness  - Also, check PSA. Discussed that it may be elevated and the decision of what to do, may need to be done with specialty involvement.     5. Needs refill on Statin. Has 1 week left.   Statin refilled.   Recheck Lipids and BMP. Also, a1C as last glucose was elevated last year. Had normal A1C at time    Follow-up 3-6 months.     --Neftaly Ferrera MD  "

## 2022-08-08 NOTE — PROGRESS NOTES
"Medical assistant intake:  Amari Eddy is a 77 year old male who presents to [facility] today for Hospital F/U (Pt had a fall and was confused after coming too. He brought in records from visit)        ASSESSMENT/PLAN:    1. Dizziness with a fall in [place] that was felt by their E.R. to be from Emphysema combined with high altitude (O2sat from 88-91% in hospital)  - aNkul has been instructed on transition to high altitude. Discussed avoidance of alcohol.    - Referred to Neurology  -Also, for the breathing and emphysema, discussed the book, \"Breath\" by Ethan Reaves. Discussed that there are large exaggerations in the book. Nakul is a fan of nasal breathing.  Also, try Coenzyme Q, 30-60 mg/day as on a Statin. Discussed that evidence for this is not solid.     2. Arthritis  -Try glucosamine 1500mg /day  - Look into taking Tumeric  - Try eliminating foods, e.g. gluten and noting whether any difference.  - Stay active and avoid weight gain.     3. Left renal cortical cyst seen as an incidental finding on chest CT scan  - Re-ordered Ultrasound. May need referral depending upon results.     4. Having recurrent of urinary urges. This was helped with Flomax at 0.4 mg/day.   - Will try increasing to 0.8 mg/day. Warned of lightheadedness  - Also, check PSA. Discussed that it may be elevated and the decision of what to do, may need to be done with specialty involvement.     5. Needs refill on Statin. Has 1 week left.   Statin refilled.   Recheck Lipids and BMP. Also, a1C as last glucose was elevated last year. Had normal A1C at time    Follow-up 3-6 months.     --Neftaly Ferrera MD      SUBJECTIVE:   Nakul presents to the [facility] ER today with several issues.  The primary issue is dizziness and a fall that he had while in [place] last week.  Nakul has a place in [place] which is at nearly 8000 feet.  In terms of the incident, this occurred on August 3, 5 days ago.  Of note, he had played golf at about 7000 feet for the 3 " days prior.  He then awoke in the middle of the night and was coughing by his wife's report.  He went to go get his inhaler and then fell while in the bathroom.  He hit the right side of his head, his right ribs, right elbow and right hip.  He was taken to an emergency room where there was a CT scan done of his head that was negative.  A CT scan of his neck that was negative.  An EKG that was negative.  He was evaluated by a few specialists and the consensus was that he was suffering from hypoxemia due to high altitude the top of his chronic emphysema.  Nakul smoked cigarettes for about 25 to 30 years.  He quit in the 1990s.  He has a local pulmonologist.  He has inhalers.  He had arrived to [place] few days prior to the above incident.  He admits that he was still having his 2-3 drinks of stiff alcohol that has been his norm.  He does this even when he is at high altitude.  He is well aware that this is not recommended.      Since returning back to [place], he is feeling well.  He is requesting further evaluation of his dizziness as this is been present for about the past 6 months.  He notices that it is worse when he is in the heat and with exercise.    In addition, he asked about evaluation of his arthritis which is in his neck, low back and right thumb as well as various other parts of his body.  He states that he is just now coming to terms that he is getting older.    He requests a repeat order for some imaging of a renal cyst which was noted on a previous CT scan.  This was ordered previously by Dr. Bo but Nakul never went for the evaluation.    Nakul states that he is starting to have increasing urinary urgency.  He had this about a year ago and was prescribed Flomax which worked very well for a while but symptoms are now worsening.    He is also in need of a refill on his statin medications.    Review Of Systems:    As per above, with the dizziness and arthritis.  Other than that he has been in his usual  "state of health.  No chest pain.  Eating and drinking well.    Problem list per EMR:  Patient Active Problem List   Diagnosis     Insomnia     Dysthymia     Essential hypertension     Hyperlipidemia LDL goal <70     Transient global amnesia     Medicare annual wellness visit, subsequent     Overactive bladder     Frequent PVCs     Other fatigue       Current Outpatient Medications   Medication Sig Dispense Refill     albuterol (PROAIR HFA/PROVENTIL HFA/VENTOLIN HFA) 108 (90 Base) MCG/ACT inhaler Inhale 2 puffs into the lungs every 6 hours 18 g 3     amLODIPine (NORVASC) 10 MG tablet Take 1 tablet (10 mg) by mouth daily 90 tablet 4     atorvastatin (LIPITOR) 40 MG tablet TAKE 1.5 TABLETS BY MOUTH ONCE DAILY. 135 tablet 4     Calcium Malate POWD 500-1,000 mg daily.       FLUoxetine (PROZAC) 20 MG capsule Take 1 capsule (20 mg) by mouth daily 90 capsule 4     ipratropium - albuterol 0.5 mg/2.5 mg/3 mL (DUONEB) 0.5-2.5 (3) MG/3ML neb solution Take 1 vial (3 mLs) by nebulization every 6 hours as needed for shortness of breath / dyspnea or wheezing 90 mL 9     losartan (COZAAR) 100 MG tablet Take 1 tablet (100 mg) by mouth daily 90 tablet 4     tamsulosin (FLOMAX) 0.4 MG capsule Take 1 capsule (0.4 mg) by mouth daily 90 capsule 3     umeclidinium-vilanterol (ANORO ELLIPTA) 62.5-25 MCG/INH oral inhaler Inhale 1 puff into the lungs daily 60 each 11     zaleplon (SONATA) 5 MG capsule Take 3 capsules at bedtime (15 mg); may repeat x 1 during the night prn. May refill every 30 days. 120 capsule 0       Allergies   Allergen Reactions     Seasonal Allergies           OBJECTIVE    Vitals: BP (!) 140/78   Pulse 58   Temp 97.3  F (36.3  C)   Ht 1.676 m (5' 5.98\")   Wt 77.6 kg (171 lb)   SpO2 93%   BMI 27.61 kg/m    BMI= Body mass index is 27.61 kg/m .  Nakul appears in good health.  His respiration is approximately 16 breaths/min and unlabored.  He speaks in complete sentences.  His mood appears good.  Affect appears " normal.    His neck is supple without lymphadenopathy.    Eyes move in all directions.    TMs are partially obstructed by cerumen.  We discussed using Debrox eardrops.    Cardiovascular-regular rate and rhythm without murmur    Lungs-clear to auscultation    Abdomen- soft and nontender.  He has a large midline abdominal hernia that is noted when he lies down on the examination table.  This is been present for years per his report.  He does not notice anything new.    Extremities move in all directions without limitation.  His gait appears normal    He is able to stand and hold his balance with his eyes closed.    Finger-nose to finger is generally intact although he does have some difficulty finding his nose with his eyes closed.  He is just off a centimeter or 2 on both sides.    Romberg test is negative.    Cranial nerves II through XII are intact.    SEE TOP OF NOTE FOR ASSESSMENT AND PLAN  50 minutes spent on the date of the encounter doing chart review, history and exam, documentation and further activities as noted in the note.       --Neftaly Ferrera MD  [facility], Department of Family Medicine and Community Health

## 2022-08-08 NOTE — NURSING NOTE
"77 year old  Chief Complaint   Patient presents with     Hospital F/U     Pt had a fall and was confused after coming too. He brought in records from visit       Blood pressure (!) 140/78, pulse 58, temperature 97.3  F (36.3  C), height 1.676 m (5' 5.98\"), weight 77.6 kg (171 lb), SpO2 93 %. Body mass index is 27.61 kg/m .  Patient Active Problem List   Diagnosis     Insomnia     Dysthymia     Essential hypertension     Hyperlipidemia LDL goal <70     Transient global amnesia     Medicare annual wellness visit, subsequent     Overactive bladder     Frequent PVCs     Other fatigue       Wt Readings from Last 2 Encounters:   08/08/22 77.6 kg (171 lb)   06/16/22 76.7 kg (169 lb)     BP Readings from Last 3 Encounters:   08/08/22 (!) 140/78   06/16/22 (!) 148/72   06/03/22 (!) 153/80         Current Outpatient Medications   Medication     albuterol (PROAIR HFA/PROVENTIL HFA/VENTOLIN HFA) 108 (90 Base) MCG/ACT inhaler     amLODIPine (NORVASC) 10 MG tablet     atorvastatin (LIPITOR) 40 MG tablet     Calcium Malate POWD     FLUoxetine (PROZAC) 20 MG capsule     ipratropium - albuterol 0.5 mg/2.5 mg/3 mL (DUONEB) 0.5-2.5 (3) MG/3ML neb solution     losartan (COZAAR) 100 MG tablet     tamsulosin (FLOMAX) 0.4 MG capsule     umeclidinium-vilanterol (ANORO ELLIPTA) 62.5-25 MCG/INH oral inhaler     zaleplon (SONATA) 5 MG capsule     No current facility-administered medications for this visit.       Social History     Tobacco Use     Smoking status: Never Smoker     Smokeless tobacco: Never Used   Substance Use Topics     Alcohol use: Yes     Drug use: No       Health Maintenance Due   Topic Date Due     ADVANCE CARE PLANNING  Never done     COPD ACTION PLAN  Never done     COVID-19 Vaccine (3 - Booster for Moderna series) 08/01/2021     DTAP/TDAP/TD IMMUNIZATION (2 - Td or Tdap) 10/31/2021     PHQ-9  04/13/2022     MEDICARE ANNUAL WELLNESS VISIT  06/14/2022     FALL RISK ASSESSMENT  06/14/2022     ZOSTER IMMUNIZATION (2 of 2) " 12/01/2021       No results found for: PAP      August 8, 2022 9:44 AM

## 2022-08-12 ENCOUNTER — ANCILLARY PROCEDURE (OUTPATIENT)
Dept: ULTRASOUND IMAGING | Facility: CLINIC | Age: 77
End: 2022-08-12
Attending: FAMILY MEDICINE
Payer: MEDICARE

## 2022-08-12 DIAGNOSIS — N28.1 RENAL CYST, LEFT: ICD-10-CM

## 2022-08-12 PROCEDURE — 76770 US EXAM ABDO BACK WALL COMP: CPT | Mod: GC | Performed by: RADIOLOGY

## 2022-09-06 DIAGNOSIS — F34.1 DYSTHYMIA: ICD-10-CM

## 2022-09-06 DIAGNOSIS — F51.01 PRIMARY INSOMNIA: ICD-10-CM

## 2022-09-06 RX ORDER — ZALEPLON 5 MG/1
CAPSULE ORAL
Qty: 120 CAPSULE | Refills: 0 | Status: SHIPPED | OUTPATIENT
Start: 2022-09-06 | End: 2022-10-24

## 2022-09-06 NOTE — TELEPHONE ENCOUNTER
Last visit 8/8/22, no future visit   Last refill zaleplon 5 mg 7/27/22 - checked   Routing refill request to provider for review/approval because:  Drug not on the [facility] refill protocol     For fluoxetine 20 mg   Prescription approved per [facility] Refill Protocol.  Evelyn Mendez RN  [facility]

## 2022-09-13 ENCOUNTER — TELEPHONE (OUTPATIENT)
Dept: FAMILY MEDICINE | Facility: CLINIC | Age: 77
End: 2022-09-13

## 2022-09-13 NOTE — TELEPHONE ENCOUNTER
Pt reports soreness in his right jaw, around TMJ. He had a fall at his home in [place] at the end of July and reports having struck the right side of his head right around where his discomfort is. He denies any severe acute pain, but rather soreness and discomfort with repeated use. Discussed the possibility of healing trauma to TMJ that is disrupted by repetitive use. Discussed supportive symptom treatment with OTC pain medication as needed, warm packs to joint, etc. Pt instructed to schedule appt for evaluation if pain should worsen over the next few weeks. Pt confirms understanding and is agreeable to this plan.    Alan DOBBS, RN  09/13/22 10:11 AM

## 2022-09-13 NOTE — TELEPHONE ENCOUNTER
Who is calling? Patient  Reason for Call:Having jaw pain, wants to speak with RN on whether he should have an appointment with PCP - the 845am on 9/14 is held on Maisha's scheduled.

## 2022-09-19 ENCOUNTER — TELEPHONE (OUTPATIENT)
Dept: FAMILY MEDICINE | Facility: CLINIC | Age: 77
End: 2022-09-19

## 2022-09-19 NOTE — TELEPHONE ENCOUNTER
"Amari called to report he just got a new apple watch and it told him his ECG reading was \"inconclusive\".  Discussed that he really should discuss software concerns with the [employer] help desk.  There are many reasons why the watch won't  an accurate reading and as long as Nakul is asymptomatic, which he is, then I advised him to disregard it at this time.  RINKU Stephens, RN, [facility]  09/19/22  4:22 PM    "

## 2022-09-27 ENCOUNTER — TELEPHONE (OUTPATIENT)
Dept: FAMILY MEDICINE | Facility: CLINIC | Age: 77
End: 2022-09-27

## 2022-09-27 DIAGNOSIS — R68.84 JAW PAIN: Primary | ICD-10-CM

## 2022-09-27 DIAGNOSIS — S09.93XA INJURY OF JAW: ICD-10-CM

## 2022-09-27 NOTE — TELEPHONE ENCOUNTER
Nakul reports 2 months ago Nakul had a bad fall and was in the hospital.  Nakul has had right sided jaw pain ever since his fall.  He reports he can only open his jaw about 90% of the way and has pain up into his right temple.  Nakul would like to see a maxillofacial specialist for this issue.  Referral placed and phone number provided to Nakul to call and schedule.  RINKU Stephens, RN, [facility]  09/27/22  4:25 PM

## 2022-09-27 NOTE — TELEPHONE ENCOUNTER
Who is calling? Patient  Reason for Call:Requesting call back to discuss what specialist he should see for his reoccurring jaw pain

## 2022-10-10 ENCOUNTER — HEALTH MAINTENANCE LETTER (OUTPATIENT)
Age: 77
End: 2022-10-10

## 2022-10-13 ENCOUNTER — MEDICAL CORRESPONDENCE (OUTPATIENT)
Dept: HEALTH INFORMATION MANAGEMENT | Facility: CLINIC | Age: 77
End: 2022-10-13

## 2022-10-14 DIAGNOSIS — I10 ESSENTIAL HYPERTENSION: ICD-10-CM

## 2022-10-14 RX ORDER — LOSARTAN POTASSIUM 100 MG/1
100 TABLET ORAL DAILY
Qty: 90 TABLET | Refills: 1 | Status: SHIPPED | OUTPATIENT
Start: 2022-10-14 | End: 2023-04-13

## 2022-10-14 NOTE — TELEPHONE ENCOUNTER
Medication requested: losartan (COZAAR) 100 MG tablet  Last office visit: 8/8/22  [facility] appointments: none  Medication last refilled: 8/23/21; 90 + 4 refills  Last qualifying labs:   Component      Latest Ref Rng & Units 8/8/2022   Potassium      3.4 - 5.3 mmol/L 4.3     Component      Latest Ref Rng & Units 8/8/2022   Creatinine      0.67 - 1.17 mg/dL 1.06     BP Readings from Last 3 Encounters:   08/08/22 (!) 140/78   06/16/22 (!) 148/72   06/03/22 (!) 153/80     Prescription approved per [facility] Refill Protocol.    Alan DOBBS, RN  10/14/22 6:07 PM

## 2022-10-24 DIAGNOSIS — F51.01 PRIMARY INSOMNIA: ICD-10-CM

## 2022-10-24 RX ORDER — ZALEPLON 5 MG/1
CAPSULE ORAL
Qty: 120 CAPSULE | Refills: 0 | Status: SHIPPED | OUTPATIENT
Start: 2022-10-24 | End: 2022-11-28

## 2022-10-24 NOTE — CONFIDENTIAL NOTE
Zaleplon (Sonata) 5 mg    Last Office Visit: 8/8/22  Future [facility] Appointments: None  Medication last refilled: 9/6/22 #120 with 0 refill(s)     verified - last fill date: 9/8/22 #120    Routing refill request to provider for review/approval because:  Drug not on the [facility] refill protocol     AUGUSTO StephensN, RN, CCM

## 2022-11-27 DIAGNOSIS — F51.01 PRIMARY INSOMNIA: ICD-10-CM

## 2022-11-28 RX ORDER — ZALEPLON 5 MG/1
CAPSULE ORAL
Qty: 120 CAPSULE | Refills: 0 | Status: SHIPPED | OUTPATIENT
Start: 2022-11-28

## 2022-11-28 NOTE — CONFIDENTIAL NOTE
Zaleplon (Sonata) 5 mg    Last Office Visit: 8/8/22  [place] [facility] [place]  Medication last refilled: 10/24/22 #120 with 0 refill(s)     verified - last fill date: 10/27/22 #120    Routing refill request to provider for review/approval because:  Drug not on the [facility] refill protocol     AUGUSTO StephensN, RN, CCM

## 2022-12-14 DIAGNOSIS — I10 ESSENTIAL HYPERTENSION: ICD-10-CM

## 2022-12-14 RX ORDER — AMLODIPINE BESYLATE 10 MG/1
10 TABLET ORAL DAILY
Qty: 90 TABLET | Refills: 1 | Status: SHIPPED | OUTPATIENT
Start: 2022-12-14 | End: 2023-06-05

## 2022-12-14 NOTE — TELEPHONE ENCOUNTER
Pt requesting refill of Amlodipine be sent to [facility] pharmacy in [place], [place].    Medication requested: amLODIPine (NORVASC) 10 MG tablet  Last office visit: 8/8/2022  [facility] appointments: none  Medication last refilled: 8/23/2021; 90 + 4 refills  Last qualifying labs:     BP Readings from Last 3 Encounters:   08/08/22 (!) 140/78   06/16/22 (!) 148/72   06/03/22 (!) 153/80       Component      Latest Ref Rng & Units 8/8/2022   Creatinine      0.67 - 1.17 mg/dL 1.06       Prescription approved per [facility] Refill Protocol.    RINKU Iyer, RN  12/14/22, 8:41 AM

## 2023-02-23 DIAGNOSIS — R35.0 BENIGN PROSTATIC HYPERPLASIA WITH URINARY FREQUENCY: ICD-10-CM

## 2023-02-23 DIAGNOSIS — N40.1 BENIGN PROSTATIC HYPERPLASIA WITH URINARY FREQUENCY: ICD-10-CM

## 2023-02-23 RX ORDER — TAMSULOSIN HYDROCHLORIDE 0.4 MG/1
0.8 CAPSULE ORAL DAILY
Qty: 100 CAPSULE | Refills: 0 | Status: SHIPPED | OUTPATIENT
Start: 2023-02-23

## 2023-02-23 NOTE — TELEPHONE ENCOUNTER
Tamsulosin (Flomax) 0.4 mg    Last Office Visit: 8/8/22  Future [facility] Appointments: None  Medication last refilled: 8/8/22 #100 with 3 refill(s)    Called [facility] in [place] and asked them to utilize available refills on file at the [facility] in [place].    Deidra Hay, AUGUSTON, RN, CCM

## 2023-03-05 DIAGNOSIS — R35.0 BENIGN PROSTATIC HYPERPLASIA WITH URINARY FREQUENCY: ICD-10-CM

## 2023-03-05 DIAGNOSIS — N40.1 BENIGN PROSTATIC HYPERPLASIA WITH URINARY FREQUENCY: ICD-10-CM

## 2023-03-07 RX ORDER — TAMSULOSIN HYDROCHLORIDE 0.4 MG/1
CAPSULE ORAL
Qty: 100 CAPSULE | Refills: 0 | OUTPATIENT
Start: 2023-03-07

## 2023-03-28 DIAGNOSIS — J43.1 PANLOBULAR EMPHYSEMA (H): ICD-10-CM

## 2023-03-28 RX ORDER — ALBUTEROL SULFATE 90 UG/1
2 AEROSOL, METERED RESPIRATORY (INHALATION) EVERY 6 HOURS
Qty: 18 G | Refills: 3 | Status: SHIPPED | OUTPATIENT
Start: 2023-03-28

## 2023-04-12 DIAGNOSIS — I10 ESSENTIAL HYPERTENSION: ICD-10-CM

## 2023-04-13 RX ORDER — LOSARTAN POTASSIUM 100 MG/1
TABLET ORAL
Qty: 90 TABLET | Refills: 1 | Status: SHIPPED | OUTPATIENT
Start: 2023-04-13 | End: 2023-10-06

## 2023-04-13 NOTE — TELEPHONE ENCOUNTER
Medication requested: losartan (COZAAR) 100 MG tablet  Last office visit: 8/8/22  [facility] appointments: none  Medication last refilled: 10/14/22; 90 + 1 refill  Last qualifying labs:   BP Readings from Last 3 Encounters:   08/08/22 (!) 140/78   06/16/22 (!) 148/72   06/03/22 (!) 153/80     Prescription approved per [facility] [employer] [facility] Refill Protocol.    Alan DOBBS, RN  04/13/23 9:22 AM

## 2023-06-04 DIAGNOSIS — I10 ESSENTIAL HYPERTENSION: ICD-10-CM

## 2023-06-05 ENCOUNTER — OFFICE VISIT (OUTPATIENT)
Dept: PULMONOLOGY | Facility: CLINIC | Age: 78
End: 2023-06-05
Attending: INTERNAL MEDICINE
Payer: MEDICARE

## 2023-06-05 VITALS
HEART RATE: 58 BPM | WEIGHT: 166 LBS | RESPIRATION RATE: 17 BRPM | SYSTOLIC BLOOD PRESSURE: 149 MMHG | DIASTOLIC BLOOD PRESSURE: 76 MMHG | HEIGHT: 66 IN | BODY MASS INDEX: 26.68 KG/M2 | OXYGEN SATURATION: 92 %

## 2023-06-05 DIAGNOSIS — J43.9 PULMONARY EMPHYSEMA, UNSPECIFIED EMPHYSEMA TYPE (H): Primary | ICD-10-CM

## 2023-06-05 PROCEDURE — G0463 HOSPITAL OUTPT CLINIC VISIT: HCPCS | Performed by: INTERNAL MEDICINE

## 2023-06-05 PROCEDURE — 99213 OFFICE O/P EST LOW 20 MIN: CPT | Performed by: INTERNAL MEDICINE

## 2023-06-05 RX ORDER — AMLODIPINE BESYLATE 10 MG/1
10 TABLET ORAL DAILY
Qty: 90 TABLET | Refills: 0 | Status: SHIPPED | OUTPATIENT
Start: 2023-06-05 | End: 2023-09-05

## 2023-06-05 ASSESSMENT — PAIN SCALES - GENERAL: PAINLEVEL: NO PAIN (0)

## 2023-06-05 NOTE — TELEPHONE ENCOUNTER
Amlodipine (Norvasc) 10 mg    Last Office Visit: 8/8/22  [place] [facility] [place]  Medication last refilled: 12/14/22 #90 with 1 refill(s)    Vital Signs Systolic Diastolic Pulse   6/5/23 149 76 58   6/16/22 148 72 62   11/26/21 148 73 66     Required labs per protocol:    LAB REF RANGE 11/15/21 8/8/22   Creatinine 0.67-1.17 mg/dL 1.18 1.06   Potassium 3.4-5.3 mmol/L 3.9 4.3     Medication is being filled for 1 time refill only due to:  Patient needs labs CMP. Patient needs to be seen because due for annual exam in August..    [employer] message sent to patient to call and schedule appointment.    AUGUSTO StephensN, RN, CCM

## 2023-06-05 NOTE — PROGRESS NOTES
"[facility]  Pulmonary Medicine  Visit Clinic Note      Dear patient. Thank you for visiting with me. I want you to feel respected, understood, and empowered. \"Respect\" is valuing you as much as I would a close family member. \"Empowerment\" happens when you are fully informed, and can make the best possible decision for you.  Please ask me questions!  Challenge anything that is not clear.       ASSESSMENT & PLAN     Patient is a 77 year old male who has been referred to pulmonary clinic for further management of his emphysema.     #Emphysema secondary to smoking GOLD class A  #Dyspnea on exertion  #Renal Cyst  - Patient has lung disease secondary to prior smoking. His symptoms have responded well to anoro.   -Continue with inhaler.   - I have also prescribed albuterol prn as well as nebulizer prn.  -I also have Azithromycin and Prednisone as a back up when he needs it   -He has finished his pulmonary rehab.   - Overall well controlled disease.     -I had a face to face conversation for his Nebulizer. He will need duoneb every 6 hours as needed.     #Lung cancer screening:  -No screening required as stopped smoking in 1990.   -Discussed a repeat CT chest but patient is not interested. We have a Ct chest from [place].     #Renal cyst:   -I have sent a message to PCP to follow up on renal cyst.  An ultrasound is done     #Dyspnea on exertion;   -His Copd could be contributing to it. Will treat as above. Also recommended to follow with cardiology as well.  Normal ECHo was noted.  Negative nuclear scan.     RTC in 12 months    25 minutes spent on the date of the encounter doing chart review, history and exam, documentation and further activities as noted above.    These conclusions are made at the best of one's knowledge and belief based on the provided evidence such as patient's history and allergy test results and they can change over time or can be incomplete because of missing information's.    I " explained the lab values, imagings and findings to the patient.  Patient expressed understanding I did not recognize any barriers to the understanding of the patient.    The above note was dictated using voice recognition software and may include typographical errors. Please contact the author for any clarifications.    Darryl Santoro MD   RN Coordinators: Ellen/Pito/Chioma: [phone]  Clinic Number: [phone]         Today's visit note:     Chief Complaint: Amari Eddy is a 77 year old year old male who is being seen for RECHECK (Return 3 month follow up)      HPI:   Patient has been referred to pulmonary clinic for further management of his emphysema.     -He had an episode of bradycardia and fatigue and was admitted to [facility].  His Ct chest for PE at the time also showed an emphysema and he was referred to pulmonary clinic here.     Patient  Had been very fuction until 8 years ago.  As of now play golf 3/4 times a week. For past few months he feels that energy level is not as good as it should be.   -  He has hacking cough which  Feels like there are secretions in the airways.   - It also wakes him up at night. Has been wheezing. He has not used albuterol yet.  He feels that he has increased dyspnea with exertion as well. Able to do his daily activities but his stamina has definitely decreased over the years.     Social History:   -3 PPD for 30 years. Total 75 pack years. Stopped smoking in 1990.   -Has couple of drink every day.     #Interval History: 6/16/22  -  Stable. No major worsening. Has been doing pulmonary rehab.  NO need for any rescue inahler.          Medications:     Current Outpatient Medications   Medication     albuterol (PROAIR HFA/PROVENTIL HFA/VENTOLIN HFA) 108 (90 Base) MCG/ACT inhaler     amLODIPine (NORVASC) 10 MG tablet     atorvastatin (LIPITOR) 40 MG tablet     Calcium Malate POWD     FLUoxetine (PROZAC) 20 MG capsule     ipratropium - albuterol 0.5 mg/2.5 mg/3 mL  "(DUONEB) 0.5-2.5 (3) MG/3ML neb solution     losartan (COZAAR) 100 MG tablet     tamsulosin (FLOMAX) 0.4 MG capsule     umeclidinium-vilanterol (ANORO ELLIPTA) 62.5-25 MCG/INH oral inhaler     zaleplon (SONATA) 5 MG capsule     No current facility-administered medications for this visit.            Review of Systems:       A complete 10 point review of systems was otherwise negative except as noted in the HPI.        PHYSICAL EXAM:  BP (!) 149/76   Pulse 58   Resp 17   Ht 1.676 m (5' 5.98\")   Wt 75.3 kg (166 lb)   SpO2 92%   BMI 26.81 kg/m       General: Well developed, well nourished, No apparent distress  Eyes: Anicteric  Nose: Nasal mucosa with no edema or hyperemia.  No polyps  Ears: Hearing grossly normal  Mouth: Oral mucosa is moist, without any lesions. No oropharyngeal exudate.  Respiratory: Good air movement. No crackles. No rhonchi. No wheezes  Cardiac: RRR, normal S1, S2. No murmurs. No JVD  Abdomen: Soft, NT/ND  Musculoskeletal: Extremities normal. No clubbing. No cyanosis. No edema.  Skin: No rash on limited exam  Neuro: Normal mentation. Normal speech.  Psych:Normal affect           Data:   All laboratory and imaging data reviewed.      PFT:       PFT Interpretation:  I personally reviewed and interpreted the PFTs.   -Patient has moderate obstructive obstructive lung disease with normal diffusio ncapacity.       Chest CT: I personally reviewed and interpreted the CT scan.  -Emphysema is noted. No pulmonary nodules noted.     No results found for this or any previous visit (from the past 168 hour(s)).                                    "

## 2023-06-05 NOTE — NURSING NOTE
Chief Complaint   Patient presents with     RECHECK     Return 3 month follow up    Medications reviewed and vital signs taken.   Shane Sampson CMA

## 2023-06-05 NOTE — LETTER
"    6/5/2023         RE: Amari Eddy  [address]  [facility] [address]        Dear Colleague,    Thank you for referring your patient, Amari Eddy, to the [facility] [place]. Please see a copy of my visit note below.    [facility]  Pulmonary Medicine  Visit Clinic Note      Dear patient. Thank you for visiting with me. I want you to feel respected, understood, and empowered. \"Respect\" is valuing you as much as I would a close family member. \"Empowerment\" happens when you are fully informed, and can make the best possible decision for you.  Please ask me questions!  Challenge anything that is not clear.       ASSESSMENT & PLAN     Patient is a 77 year old male who has been referred to pulmonary clinic for further management of his emphysema.     #Emphysema secondary to smoking GOLD class A  #Dyspnea on exertion  #Renal Cyst  - Patient has lung disease secondary to prior smoking. His symptoms have responded well to anoro.   -Continue with inhaler.   - I have also prescribed albuterol prn as well as nebulizer prn.  -I also have Azithromycin and Prednisone as a back up when he needs it   -He has finished his pulmonary rehab.   - Overall well controlled disease.     -I had a face to face conversation for his Nebulizer. He will need duoneb every 6 hours as needed.     #Lung cancer screening:  -No screening required as stopped smoking in 1990.   -Discussed a repeat CT chest but patient is not interested. We have a Ct chest from [place].     #Renal cyst:   -I have sent a message to PCP to follow up on renal cyst.  An ultrasound is done     #Dyspnea on exertion;   -His Copd could be contributing to it. Will treat as above. Also recommended to follow with cardiology as well.  Normal ECHo was noted.  Negative nuclear scan.     RTC in 12 months    25 minutes spent on the date of the encounter doing chart review, history and exam, " documentation and further activities as noted above.    These conclusions are made at the best of one's knowledge and belief based on the provided evidence such as patient's history and allergy test results and they can change over time or can be incomplete because of missing information's.    I explained the lab values, imagings and findings to the patient.  Patient expressed understanding I did not recognize any barriers to the understanding of the patient.    The above note was dictated using voice recognition software and may include typographical errors. Please contact the author for any clarifications.    Darryl Santoro MD   RN Coordinators: Ellen/Pito/Chioma: [phone]  Clinic Number: [phone]         Today's visit note:     Chief Complaint: Amari Eddy is a 77 year old year old male who is being seen for RECHECK (Return 3 month follow up)      HPI:   Patient has been referred to pulmonary clinic for further management of his emphysema.     -He had an episode of bradycardia and fatigue and was admitted to [facility].  His Ct chest for PE at the time also showed an emphysema and he was referred to pulmonary clinic here.     Patient  Had been very fuction until 8 years ago.  As of now play golf 3/4 times a week. For past few months he feels that energy level is not as good as it should be.   -  He has hacking cough which  Feels like there are secretions in the airways.   - It also wakes him up at night. Has been wheezing. He has not used albuterol yet.  He feels that he has increased dyspnea with exertion as well. Able to do his daily activities but his stamina has definitely decreased over the years.     Social History:   -3 PPD for 30 years. Total 75 pack years. Stopped smoking in 1990.   -Has couple of drink every day.     #Interval History: 6/16/22  -  Stable. No major worsening. Has been doing pulmonary rehab.  NO need for any rescue inahler.          Medications:     Current Outpatient  "Medications   Medication    albuterol (PROAIR HFA/PROVENTIL HFA/VENTOLIN HFA) 108 (90 Base) MCG/ACT inhaler    amLODIPine (NORVASC) 10 MG tablet    atorvastatin (LIPITOR) 40 MG tablet    Calcium Malate POWD    FLUoxetine (PROZAC) 20 MG capsule    ipratropium - albuterol 0.5 mg/2.5 mg/3 mL (DUONEB) 0.5-2.5 (3) MG/3ML neb solution    losartan (COZAAR) 100 MG tablet    tamsulosin (FLOMAX) 0.4 MG capsule    umeclidinium-vilanterol (ANORO ELLIPTA) 62.5-25 MCG/INH oral inhaler    zaleplon (SONATA) 5 MG capsule     No current facility-administered medications for this visit.            Review of Systems:       A complete 10 point review of systems was otherwise negative except as noted in the HPI.        PHYSICAL EXAM:  BP (!) 149/76   Pulse 58   Resp 17   Ht 1.676 m (5' 5.98\")   Wt 75.3 kg (166 lb)   SpO2 92%   BMI 26.81 kg/m       General: Well developed, well nourished, No apparent distress  Eyes: Anicteric  Nose: Nasal mucosa with no edema or hyperemia.  No polyps  Ears: Hearing grossly normal  Mouth: Oral mucosa is moist, without any lesions. No oropharyngeal exudate.  Respiratory: Good air movement. No crackles. No rhonchi. No wheezes  Cardiac: RRR, normal S1, S2. No murmurs. No JVD  Abdomen: Soft, NT/ND  Musculoskeletal: Extremities normal. No clubbing. No cyanosis. No edema.  Skin: No rash on limited exam  Neuro: Normal mentation. Normal speech.  Psych:Normal affect           Data:   All laboratory and imaging data reviewed.      PFT:       PFT Interpretation:  I personally reviewed and interpreted the PFTs.   -Patient has moderate obstructive obstructive lung disease with normal diffusio ncapacity.       Chest CT: I personally reviewed and interpreted the CT scan.  -Emphysema is noted. No pulmonary nodules noted.     No results found for this or any previous visit (from the past 168 hour(s)).                                        Again, thank you for allowing me to participate in the care of your patient.  "       Sincerely,        Darryl Santoro MD

## 2023-06-26 ENCOUNTER — MYC MEDICAL ADVICE (OUTPATIENT)
Dept: PULMONOLOGY | Facility: CLINIC | Age: 78
End: 2023-06-26
Payer: MEDICARE

## 2023-06-26 DIAGNOSIS — J43.1 PANLOBULAR EMPHYSEMA (H): ICD-10-CM

## 2023-06-26 RX ORDER — UMECLIDINIUM BROMIDE AND VILANTEROL TRIFENATATE 62.5; 25 UG/1; UG/1
1 POWDER RESPIRATORY (INHALATION) DAILY
Qty: 60 EACH | Refills: 11 | Status: SHIPPED | OUTPATIENT
Start: 2023-06-26

## 2023-07-07 DIAGNOSIS — J43.1 PANLOBULAR EMPHYSEMA (H): ICD-10-CM

## 2023-07-07 RX ORDER — UMECLIDINIUM BROMIDE AND VILANTEROL TRIFENATATE 62.5; 25 UG/1; UG/1
1 POWDER RESPIRATORY (INHALATION) DAILY
Qty: 60 EACH | Refills: 11 | OUTPATIENT
Start: 2023-07-07

## 2023-07-10 ENCOUNTER — OFFICE VISIT (OUTPATIENT)
Dept: FAMILY MEDICINE | Facility: CLINIC | Age: 78
End: 2023-07-10
Payer: MEDICARE

## 2023-07-10 VITALS
SYSTOLIC BLOOD PRESSURE: 147 MMHG | HEART RATE: 58 BPM | DIASTOLIC BLOOD PRESSURE: 84 MMHG | BODY MASS INDEX: 26.81 KG/M2 | RESPIRATION RATE: 18 BRPM | TEMPERATURE: 97.6 F | OXYGEN SATURATION: 94 % | WEIGHT: 166 LBS

## 2023-07-10 DIAGNOSIS — M47.816 OSTEOARTHRITIS OF LUMBAR SPINE, UNSPECIFIED SPINAL OSTEOARTHRITIS COMPLICATION STATUS: ICD-10-CM

## 2023-07-10 DIAGNOSIS — M47.812 OSTEOARTHRITIS OF CERVICAL SPINE, UNSPECIFIED SPINAL OSTEOARTHRITIS COMPLICATION STATUS: ICD-10-CM

## 2023-07-10 DIAGNOSIS — J43.9 PULMONARY EMPHYSEMA, UNSPECIFIED EMPHYSEMA TYPE (H): Primary | ICD-10-CM

## 2023-07-10 DIAGNOSIS — G47.00 INSOMNIA, UNSPECIFIED TYPE: ICD-10-CM

## 2023-07-10 RX ORDER — ZOLPIDEM TARTRATE 5 MG/1
5 TABLET ORAL
Qty: 30 TABLET | Refills: 1 | Status: SHIPPED | OUTPATIENT
Start: 2023-07-10

## 2023-07-10 NOTE — PATIENT INSTRUCTIONS
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 Elected to shift him to Ambien.   Stop Sonata  Start Ambien. Sent in 30 tabs of 5 mg with 1 refill. Let us know how it goes  Also, encouraged to decrease alcohol (currently at 2-3 drinks/night)      Follow-up for an annual exam at some point in the next few months    Neftaly Ferrera MD  Family Medicine and Sports Medicine  [facility]

## 2023-07-10 NOTE — NURSING NOTE
78 year old  Chief Complaint   Patient presents with     Recheck Medication     Follow Up     Emphysema dx last year -- would like a new pulmonologist (referral).  Arthritis -- has been ignoring it for a while, time to stop ignoring.       Blood pressure (!) 147/84, pulse 58, temperature 97.6  F (36.4  C), temperature source Temporal, resp. rate 18, weight 75.3 kg (166 lb), SpO2 94 %. Body mass index is 26.81 kg/m .  Patient Active Problem List   Diagnosis     Insomnia     Dysthymia     Essential hypertension     Hyperlipidemia LDL goal <70     Transient global amnesia     Medicare annual wellness visit, subsequent     Overactive bladder     Frequent PVCs     Other fatigue       Wt Readings from Last 2 Encounters:   07/10/23 75.3 kg (166 lb)   06/05/23 75.3 kg (166 lb)     BP Readings from Last 3 Encounters:   07/10/23 (!) 147/84   06/05/23 (!) 149/76   08/08/22 (!) 140/78         Current Outpatient Medications   Medication     albuterol (PROAIR HFA/PROVENTIL HFA/VENTOLIN HFA) 108 (90 Base) MCG/ACT inhaler     amLODIPine (NORVASC) 10 MG tablet     atorvastatin (LIPITOR) 40 MG tablet     Calcium Malate POWD     FLUoxetine (PROZAC) 20 MG capsule     losartan (COZAAR) 100 MG tablet     umeclidinium-vilanterol (ANORO ELLIPTA) 62.5-25 MCG/ACT oral inhaler     zaleplon (SONATA) 5 MG capsule     ipratropium - albuterol 0.5 mg/2.5 mg/3 mL (DUONEB) 0.5-2.5 (3) MG/3ML neb solution     tamsulosin (FLOMAX) 0.4 MG capsule     No current facility-administered medications for this visit.       Social History     Tobacco Use     Smoking status: Never     Smokeless tobacco: Never   Substance Use Topics     Alcohol use: Yes     Drug use: No       Health Maintenance Due   Topic Date Due     ADVANCE CARE PLANNING  Never done     COPD ACTION PLAN  Never done     DTAP/TDAP/TD IMMUNIZATION (2 - Td or Tdap) 10/31/2021     ZOSTER IMMUNIZATION (2 of 2) 12/01/2021     MEDICARE ANNUAL WELLNESS VISIT  06/14/2022     FALL RISK ASSESSMENT   06/14/2022     PHQ-9  11/08/2022     COVID-19 Vaccine (4 - Moderna series) 02/20/2023       No results found for: PAP      July 10, 2023 10:54 AM

## 2023-07-10 NOTE — PROGRESS NOTES
Medical assistant intake:  Amari Eddy is a 78 year old male who presents to 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 Elected to shift him to Ambien.   o Stop Sonata  o Start Ambien. Sent in 30 tabs of 5 mg with 1 refill. Let us know how it goes  o Also, encouraged to decrease alcohol (currently at 2-3 drinks/night)      Follow-up for an annual exam at some point in the next few months    Neftaly Ferrera MD  Family Medicine and Sports Medicine  [facility]        SUBJECTIVE:   Nakul is a 79 yo who I have seen on a few occasions in the past. Last was nearly a year ago on 8/8/22.    He is here today for a few issues:  1.  States that he has emphysema and would like to establish care with a pulmonologist would follow him forward and  him on his course and any need for medications to help minimize progression.  He has occasional cough and some shortness of breath.  This occurs more when [place].    2.  Also has DJD in his cervical and lumbar spines and in the base of his thumbs.  He would like a referral for his spine osteoarthritis.  He has not had any physical therapy recently.    3.  Insomnia-has been on Sonata for years but finds it very short acting.  He takes 2 before bedtime but then needs to take a 3rd tablet to get him to be able to sleep asked about 6 in the morning.  Has been on Sonata for years.  Still drinking 2-3 drinks of scotch per night.    Misc: States that he has  had some urinary issues and now does self-catheterization a few times per day.  States that it is not a problem for him.  I believe that this was diagnosed and treated in [place].  He was not here to discuss this today but he mention this in passing.    Review Of Systems:    See subjective.   Has otherwise been in usual state of health    Problem list per EMR:  Patient Active Problem List   Diagnosis     Insomnia     Dysthymia     Essential hypertension     Hyperlipidemia LDL goal <70     Transient global amnesia     Medicare annual wellness visit, subsequent     Overactive bladder     Frequent PVCs     Other fatigue       Current Outpatient Medications   Medication Sig Dispense Refill     albuterol (PROAIR HFA/PROVENTIL HFA/VENTOLIN HFA) 108 (90 Base) MCG/ACT inhaler Inhale 2 puffs into the lungs every 6 hours 18 g 3     amLODIPine (NORVASC) 10 MG tablet TAKE 1 TABLET (10 MG) BY MOUTH DAILY. 90 tablet 0     atorvastatin (LIPITOR) 40 MG tablet TAKE 1.5 TABLETS BY MOUTH ONCE DAILY. 135 tablet 4     Calcium Malate POWD 500-1,000 mg daily.       FLUoxetine (PROZAC) 20 MG capsule Take 1 capsule (20 mg) by mouth daily 90 capsule 3     losartan (COZAAR) 100 MG tablet TAKE 1 TABLET BY MOUTH EVERY DAY 90 tablet 1     umeclidinium-vilanterol (ANORO ELLIPTA) 62.5-25 MCG/ACT oral inhaler Inhale 1 puff into the lungs daily 60 each 11     zaleplon (SONATA) 5 MG capsule TAKE 3 CAPSULES AT BEDTIME. MAY REPEAT 1 TIME DURING THE NIGHT AS NEEDED. 120 capsule 0     ipratropium - albuterol 0.5 mg/2.5 mg/3 mL (DUONEB) 0.5-2.5 (3) MG/3ML neb solution Take 1 vial (3 mLs) by nebulization every 6 hours as needed for shortness of breath / dyspnea or wheezing (Patient not taking: Reported on 7/10/2023) 90 mL 9     tamsulosin (FLOMAX) 0.4 MG capsule Take 2 capsules (0.8 mg) by mouth daily (Patient not taking: Reported on 7/10/2023) 100 capsule 0       Allergies   Allergen Reactions     Seasonal Allergies             OBJECTIVE    Vitals: BP (!)  147/84 (BP Location: Left arm, Patient Position: Sitting, Cuff Size: Adult Regular)   Pulse 58   Temp 97.6  F (36.4  C) (Temporal)   Resp 18   Wt 75.3 kg (166 lb)   SpO2 94%   BMI 26.81 kg/m    BMI= Body mass index is 26.81 kg/m .  He appears in no distress.  His neck has very minimal extension and also slightly limited range of motion to either the left or the right.  His lumbar spine also was limited in range of motion he can forward bend and touch just below his knees.  His gait otherwise appears normal.    Cardiovascular-regular rate and rhythm without murmur    Lungs-clear to auscultation throughout.  No wheezes or rales were heard.    Extremities-no edema noted    SEE TOP OF NOTE FOR ASSESSMENT AND PLAN  35 minutes spent on the date of the encounter doing chart review, history and exam, documentation and further activities as noted in the note.     --Neftaly Ferrera MD  [facility], Department of Family Medicine and Community Health

## 2023-07-14 DIAGNOSIS — J43.9 PULMONARY EMPHYSEMA, UNSPECIFIED EMPHYSEMA TYPE (H): ICD-10-CM

## 2023-07-14 LAB
6 MIN WALK (FT): 1520 FT
6 MIN WALK (M): 463 M

## 2023-07-14 PROCEDURE — 94618 PULMONARY STRESS TESTING: CPT | Performed by: INTERNAL MEDICINE

## 2023-07-14 PROCEDURE — 94060 EVALUATION OF WHEEZING: CPT | Performed by: INTERNAL MEDICINE

## 2023-07-14 PROCEDURE — 94726 PLETHYSMOGRAPHY LUNG VOLUMES: CPT | Performed by: INTERNAL MEDICINE

## 2023-07-14 PROCEDURE — 99207 PR NO CHARGE LOS: CPT

## 2023-07-14 PROCEDURE — 94729 DIFFUSING CAPACITY: CPT | Performed by: INTERNAL MEDICINE

## 2023-07-17 LAB
DLCOUNC-%PRED-PRE: 68 %
DLCOUNC-PRE: 15.34 ML/MIN/MMHG
DLCOUNC-PRED: 22.4 ML/MIN/MMHG
ERV-%PRED-PRE: 125 %
ERV-PRE: 1.32 L
ERV-PRED: 1.05 L
EXPTIME-PRE: 12.75 SEC
FEF2575-%PRED-POST: 39 %
FEF2575-%PRED-PRE: 35 %
FEF2575-POST: 0.73 L/SEC
FEF2575-PRE: 0.66 L/SEC
FEF2575-PRED: 1.84 L/SEC
FEFMAX-%PRED-PRE: 80 %
FEFMAX-PRE: 5.46 L/SEC
FEFMAX-PRED: 6.76 L/SEC
FEV1-%PRED-PRE: 76 %
FEV1-PRE: 1.89 L
FEV1FEV6-PRE: 58 %
FEV1FEV6-PRED: 77 %
FEV1FVC-PRE: 52 %
FEV1FVC-PRED: 76 %
FEV1SVC-PRE: 52 %
FEV1SVC-PRED: 67 %
FIFMAX-PRE: 5.39 L/SEC
FRCPLETH-%PRED-PRE: 120 %
FRCPLETH-PRE: 4.25 L
FRCPLETH-PRED: 3.51 L
FVC-%PRED-PRE: 112 %
FVC-PRE: 3.66 L
FVC-PRED: 3.25 L
IC-%PRED-PRE: 86 %
IC-PRE: 2.28 L
IC-PRED: 2.64 L
RVPLETH-%PRED-PRE: 119 %
RVPLETH-PRE: 2.93 L
RVPLETH-PRED: 2.46 L
TLCPLETH-%PRED-PRE: 102 %
TLCPLETH-PRE: 6.53 L
TLCPLETH-PRED: 6.34 L
VA-%PRED-PRE: 89 %
VA-PRE: 5.03 L
VC-%PRED-PRE: 97 %
VC-PRE: 3.6 L
VC-PRED: 3.7 L

## 2023-07-18 ENCOUNTER — TELEPHONE (OUTPATIENT)
Dept: PULMONOLOGY | Facility: CLINIC | Age: 78
End: 2023-07-18
Payer: MEDICARE

## 2023-07-18 NOTE — TELEPHONE ENCOUNTER
Patient Contacted for the patient to call back and schedule the following:    Patient stated that he will call back in the winter to set up follow up appointment

## 2023-08-07 ENCOUNTER — TELEPHONE (OUTPATIENT)
Dept: PULMONOLOGY | Facility: CLINIC | Age: 78
End: 2023-08-07
Payer: MEDICARE

## 2023-08-07 NOTE — TELEPHONE ENCOUNTER
Patient Contacted for the patient to call back and schedule the following:    Appointment type: TRACI  Provider: CHILO  Return date: DECEMBER 2023  Specialty phone number: [phone]  Additional appointment(s) needed: NA  Additonal Notes: Pt declined to schedule and will call back when he is ready to schedule. Pt has call center phone number.

## 2023-08-10 DIAGNOSIS — E78.5 HYPERLIPIDEMIA LDL GOAL <70: ICD-10-CM

## 2023-08-11 RX ORDER — ATORVASTATIN CALCIUM 40 MG/1
TABLET, FILM COATED ORAL
Qty: 135 TABLET | Refills: 0 | Status: SHIPPED | OUTPATIENT
Start: 2023-08-11

## 2023-08-11 NOTE — TELEPHONE ENCOUNTER
Last visit 7/10/23, no future visit  Medication is being filled for 1 time refill only due to:  Patient needs labs lipids. Future labs ordered lipids.   Evelyn Mendez RN  [facility]

## 2023-08-20 ENCOUNTER — HEALTH MAINTENANCE LETTER (OUTPATIENT)
Age: 78
End: 2023-08-20

## 2023-09-03 DIAGNOSIS — I10 ESSENTIAL HYPERTENSION: ICD-10-CM

## 2023-09-03 DIAGNOSIS — F34.1 DYSTHYMIA: ICD-10-CM

## 2023-09-05 RX ORDER — AMLODIPINE BESYLATE 10 MG/1
10 TABLET ORAL DAILY
Qty: 90 TABLET | Refills: 1 | Status: SHIPPED | OUTPATIENT
Start: 2023-09-05 | End: 2024-03-05

## 2023-09-05 NOTE — TELEPHONE ENCOUNTER
Medication requested: amLODIPine (NORVASC) 10 MG tablet   Last office visit: 7/10/2023  [facility]  Medication last refilled: 6/5/2023; 90 + 0 refills    Medication requested: FLUoxetine (PROZAC) 20 MG capsule   Medication last refilled: 9/6/2022; 90 + 3 refills  Last qualifying labs:     Component      Latest Ref Rng 8/8/2022  11:02 AM   Creatinine      0.67 - 1.17 mg/dL 1.06      Component      Latest Ref Rng 8/8/2022  11:02 AM   Potassium      3.4 - 5.3 mmol/L 4.3       8/8/2022  9:44 AM   PHQ-9 and GAD7 Scores    PHQ9 TOTAL SCORE    PHQ-9 Total Score 1    EMMANUELLE-7 Total Score 0      Prescription approved per [facility] Refill Protocol.    RINKU Iyer, RN  09/05/23, 3:28 PM

## 2023-10-06 DIAGNOSIS — I10 ESSENTIAL HYPERTENSION: ICD-10-CM

## 2023-10-06 RX ORDER — LOSARTAN POTASSIUM 100 MG/1
100 TABLET ORAL DAILY
Qty: 90 TABLET | Refills: 0 | Status: SHIPPED | OUTPATIENT
Start: 2023-10-06 | End: 2024-01-08

## 2023-10-06 NOTE — TELEPHONE ENCOUNTER
Medication requested: losartan (COZAAR) 100 MG tablet   Last office visit: 7/10/23  [facility] appointments: none  Medication last refilled: 4/13/23; 90 + 1 refill  Last qualifying labs:   BP Readings from Last 3 Encounters:   07/10/23 (!) 147/84   06/05/23 (!) 149/76   08/08/22 (!) 140/78     Component      Latest Ref Rng 8/8/2022  11:02 AM   Creatinine      0.67 - 1.17 mg/dL 1.06      Component      Latest Ref Rng 8/8/2022  11:02 AM   Potassium      3.4 - 5.3 mmol/L 4.3      Medication is being filled for 1 time refill only due to:  Patient needs labs BMP. Patient needs to be seen because due for blood pressure follow-up.    Alan DOBBS, RN  10/06/23 2:35 PM

## 2024-01-07 DIAGNOSIS — I10 ESSENTIAL HYPERTENSION: ICD-10-CM

## 2024-01-08 RX ORDER — LOSARTAN POTASSIUM 100 MG/1
100 TABLET ORAL DAILY
Qty: 30 TABLET | Refills: 0 | Status: SHIPPED | OUTPATIENT
Start: 2024-01-08

## 2024-01-08 NOTE — TELEPHONE ENCOUNTER
Medication requested: losartan (COZAAR) 100 MG tablet   Last office visit: 7/10/23  [facility] appointments: none  Medication last refilled: 10/6/23; 90 + 0 refills  Last qualifying labs:   BP Readings from Last 3 Encounters:   07/10/23 (!) 147/84   06/05/23 (!) 149/76   08/08/22 (!) 140/78     Component      Latest Ref Rng 8/8/2022  11:02 AM   Potassium      3.4 - 5.3 mmol/L 4.3      Component      Latest Ref Rng 8/8/2022  11:02 AM   Creatinine      0.67 - 1.17 mg/dL 1.06      Medication is being filled for 1 time refill only due to:  Patient needs labs BMP. Patient needs to be seen because due for annual visit.    Message sent to pt to schedule appointment with Dr. Ferrera.    Alan DOBBS, RN  01/08/24 2:40 PM

## 2024-03-02 DIAGNOSIS — F34.1 DYSTHYMIA: ICD-10-CM

## 2024-03-02 DIAGNOSIS — I10 ESSENTIAL HYPERTENSION: ICD-10-CM

## 2024-03-04 NOTE — TELEPHONE ENCOUNTER
Medication requested: FLUoxetine (PROZAC) 20 MG capsule   Last office visit:   [facility] appointments:   Medication last refilled: 9/5/23; 90 + 1 refill  Last qualifying labs:    8/8/2022  9:44 AM   PHQ-9 / EMMANUELLE-7 Scores 8/2015 to present    EMMANUELLE-7 Score DocFlow 0       8/8/2022  9:44 AM   PHQ-9 / EMMANUELLE-7 Scores 8/2015 to present    PHQ-9 Score DocFlow 1      Medication requested: amLODIPine (NORVASC) 10 MG tablet   Last office visit:   [facility] appointments:   Medication last refilled: 9/5/23; 90 + 1 refill  Last qualifying labs:   BP Readings from Last 3 Encounters:   07/10/23 (!) 147/84   06/05/23 (!) 149/76   08/08/22 (!) 140/78     Routing refill request to provider for review/approval because:  Desiree given x1 and patient did not follow up, please advise    Alan DOBBS, RN  03/04/24 3:04 PM

## 2024-03-05 RX ORDER — AMLODIPINE BESYLATE 10 MG/1
10 TABLET ORAL DAILY
Qty: 90 TABLET | Refills: 0 | Status: SHIPPED | OUTPATIENT
Start: 2024-03-05 | End: 2024-06-03

## 2024-06-02 DIAGNOSIS — I10 ESSENTIAL HYPERTENSION: ICD-10-CM

## 2024-06-02 DIAGNOSIS — F34.1 DYSTHYMIA: ICD-10-CM

## 2024-06-03 DIAGNOSIS — F34.1 DYSTHYMIA: ICD-10-CM

## 2024-06-03 DIAGNOSIS — I10 ESSENTIAL HYPERTENSION: ICD-10-CM

## 2024-06-03 RX ORDER — AMLODIPINE BESYLATE 10 MG/1
10 TABLET ORAL DAILY
Qty: 90 TABLET | Refills: 0 | Status: SHIPPED | OUTPATIENT
Start: 2024-06-03

## 2024-06-03 NOTE — TELEPHONE ENCOUNTER
Medication requested: FLUoxetine (PROZAC) 20 MG capsule   Last office visit: 7/10/23  [facility] appointments: none  Medication last refilled: 3/5/24; 90 + 0 refills  Last qualifying labs:    8/8/2022  9:44 AM   PHQ-9 / EMMANUELLE-7 Scores 8/2015 to present    EMMANUELLE-7 Score DocFlow 0       8/8/2022  9:44 AM   PHQ-9 / EMMANUELLE-7 Scores 8/2015 to present    PHQ-9 Score DocFlow 1      Medication requested: amLODIPine (NORVASC) 10 MG tablet   Medication last refilled: 3/5/24; 90 + 0 refills  Last qualifying labs:   BP Readings from Last 3 Encounters:   07/10/23 (!) 147/84   06/05/23 (!) 149/76   08/08/22 (!) 140/78     Component      Latest Ref Rng 8/8/2022  11:02 AM   GFR Estimate      >60 mL/min/1.73m2 72      Routing refill request to provider for review/approval because:  Desiree given x2 and patient did not follow up, please advise    Alan DOBBS, RN  06/03/24 12:04 PM

## 2024-06-04 RX ORDER — AMLODIPINE BESYLATE 10 MG/1
10 TABLET ORAL DAILY
Qty: 90 TABLET | Refills: 0 | Status: SHIPPED | OUTPATIENT
Start: 2024-06-04

## 2024-09-05 RX ORDER — AMLODIPINE BESYLATE 10 MG/1
10 TABLET ORAL DAILY
Qty: 90 TABLET | Refills: 0 | OUTPATIENT
Start: 2024-09-05

## 2024-10-13 ENCOUNTER — HEALTH MAINTENANCE LETTER (OUTPATIENT)
Age: 79
End: 2024-10-13

## 2025-03-24 NOTE — TELEPHONE ENCOUNTER
Rx called in to pt's pharmacy.     Subjective   Jonathan Godoy is a 64 y.o. male.     Chief Complaint   Patient presents with    Annual Exam    Hypertension    Hyperlipidemia       The patient is here: to discuss health maintenance and disease prevention to follow up on multiple medical problems.  Last comprehensive physical was on 3/6/2024.  Previous physical was performed by  Mohit Alvarez MD  Overall has: moderate activity with work/home activities, good appetite, feels well with minor complaints, good energy level, and is sleeping well. Nutrition: balanced diet. Last tetanus shot was unknown.     Hypertension  This is a chronic problem. The current episode started more than 1 year ago. Pertinent negatives include no chest pain, neck pain, palpitations or shortness of breath. Risk factors for coronary artery disease include dyslipidemia, male gender and obesity. Past treatments include nothing. Current antihypertension treatment includes ACE inhibitors, beta blockers and calcium channel blockers.   Hyperlipidemia  This is a chronic problem. The current episode started more than 1 year ago. Exacerbating diseases include obesity. Pertinent negatives include no chest pain or shortness of breath. Current antihyperlipidemic treatment includes statins. There are no compliance problems.  Risk factors for coronary artery disease include male sex, hypertension, obesity and dyslipidemia.        Recent Hospitalizations:  No hospitalization(s) within the last year..  ccc      I personally reviewed and updated the patient's allergies, medications, problem list, and past medical, surgical, social, and family history. I have reviewed and confirmed the accuracy of the HPI and ROS as documented by the MA/LPN/RN Mohit Alvarez MD    Family History   Problem Relation Age of Onset    Hypertension Mother     Coronary artery disease Mother     Hypertension Father     Cerebral aneurysm Other        Social History     Tobacco Use    Smoking status: Never     Passive  exposure: Never    Smokeless tobacco: Never   Vaping Use    Vaping status: Never Used   Substance Use Topics    Alcohol use: Not Currently    Drug use: Not Currently       Past Surgical History:   Procedure Laterality Date    ANKLE SURGERY      Has hardwear in ankle       Patient Active Problem List   Diagnosis    Annual visit for general adult medical examination with abnormal findings    Class 2 severe obesity due to excess calories with serious comorbidity and body mass index (BMI) of 36.0 to 36.9 in adult    Essential hypertension    Mixed hyperlipidemia    Renal insufficiency    Family history of cerebral aneurysm    Colon cancer screening    Screening for prostate cancer    Elevated fasting blood sugar         Current Outpatient Medications:     amLODIPine (NORVASC) 5 MG tablet, TAKE 1 TABLET BY MOUTH EVERY DAY, Disp: 90 tablet, Rfl: 1    lisinopril (PRINIVIL,ZESTRIL) 40 MG tablet, TAKE 1 TABLET BY MOUTH EVERY DAY, Disp: 90 tablet, Rfl: 1    metoprolol succinate XL (TOPROL-XL) 100 MG 24 hr tablet, TAKE 1 TABLET BY MOUTH EVERY DAY, Disp: 90 tablet, Rfl: 1    rosuvastatin (CRESTOR) 5 MG tablet, TAKE 1 TABLET BY MOUTH EVERY DAY, Disp: 90 tablet, Rfl: 3    Review of Systems   Constitutional:  Negative for chills and diaphoresis.   HENT:  Negative for trouble swallowing and voice change.    Eyes:  Negative for visual disturbance.   Respiratory:  Negative for shortness of breath.    Cardiovascular:  Negative for chest pain and palpitations.   Gastrointestinal:  Negative for abdominal pain and nausea.   Endocrine: Negative for polydipsia and polyphagia.   Genitourinary:  Negative for hematuria.   Musculoskeletal:  Negative for neck pain and neck stiffness.   Skin:  Negative for color change and pallor.   Allergic/Immunologic: Negative for immunocompromised state.   Neurological:  Negative for seizures and syncope.   Hematological:  Negative for adenopathy.   Psychiatric/Behavioral:  Negative for hallucinations, sleep  "disturbance and suicidal ideas.        I have reviewed and confirmed the accuracy of the ROS as documented by the MA/LPN/RN Mohit Alvarez MD      Objective   /84 (BP Location: Right arm, Patient Position: Sitting, Cuff Size: Adult)   Pulse 61   Temp 98 °F (36.7 °C) (Temporal)   Resp 16   Ht 177.8 cm (70\")   Wt 120 kg (265 lb 6.4 oz)   SpO2 93%   BMI 38.08 kg/m²   BP Readings from Last 3 Encounters:   03/25/25 142/84   03/06/24 144/80   03/01/23 148/86     Wt Readings from Last 3 Encounters:   03/25/25 120 kg (265 lb 6.4 oz)   03/06/24 118 kg (260 lb)   03/01/23 116 kg (255 lb)     Physical Exam  Constitutional:       Appearance: Normal appearance. He is well-developed. He is not diaphoretic.   HENT:      Head: Normocephalic and atraumatic.      Right Ear: Tympanic membrane, ear canal and external ear normal.      Left Ear: Tympanic membrane, ear canal and external ear normal.      Nose: Nose normal.      Mouth/Throat:      Mouth: Mucous membranes are moist.   Eyes:      General: Lids are normal.      Extraocular Movements: Extraocular movements intact.      Conjunctiva/sclera: Conjunctivae normal.      Pupils: Pupils are equal, round, and reactive to light.   Neck:      Thyroid: No thyromegaly.      Vascular: No carotid bruit or JVD.      Trachea: No tracheal deviation.   Cardiovascular:      Rate and Rhythm: Normal rate and regular rhythm.      Heart sounds: Normal heart sounds. No murmur heard.     No friction rub. No gallop.   Pulmonary:      Effort: Pulmonary effort is normal.      Breath sounds: Normal breath sounds. No stridor. No decreased breath sounds, wheezing or rales.   Abdominal:      General: Bowel sounds are normal. There is no distension.      Palpations: Abdomen is soft. There is no mass.      Tenderness: There is no abdominal tenderness. There is no guarding or rebound.      Hernia: No hernia is present.   Lymphadenopathy:      Head:      Right side of head: No submental, " submandibular, tonsillar, preauricular, posterior auricular or occipital adenopathy.      Left side of head: No submental, submandibular, tonsillar, preauricular, posterior auricular or occipital adenopathy.      Cervical: No cervical adenopathy.   Skin:     General: Skin is warm and dry.      Coloration: Skin is not pale.   Neurological:      Mental Status: He is alert and oriented to person, place, and time.      Cranial Nerves: No cranial nerve deficit.      Sensory: No sensory deficit.      Coordination: Coordination normal.      Gait: Gait normal.      Deep Tendon Reflexes: Reflexes are normal and symmetric.         Data / Lab Results:    Hemoglobin A1C   Date Value Ref Range Status   02/22/2022 5.7 % Final   02/16/2021 5.8 % Final     Lab Results   Component Value Date    Glucose 104 (H) 03/22/2025    Glucose, UA Negative 03/06/2024     Lab Results   Component Value Date    LDL 82 03/22/2025    LDL 81 03/02/2024     (H) 02/25/2023     Lab Results   Component Value Date    CHOL 168 12/08/2018    CHOL 189 12/02/2017    CHOL 188 12/03/2016     Lab Results   Component Value Date    TRIG 99 03/22/2025    TRIG 96 03/02/2024    TRIG 114 02/25/2023     Lab Results   Component Value Date    HDL 41 03/22/2025    HDL 38 (L) 03/02/2024    HDL 37 (L) 02/25/2023     Lab Results   Component Value Date    PSA 0.9 03/22/2025    PSA 1.0 03/02/2024    PSA 1.0 02/25/2023     Lab Results   Component Value Date    WBC 8.1 03/22/2025    HGB 14.1 03/22/2025    HCT 42.7 03/22/2025    MCV 92 03/22/2025     03/22/2025     Lab Results   Component Value Date    TSH 2.710 03/22/2025     Lab Results   Component Value Date    GLUCOSE 104 (H) 03/22/2025    BUN 14 03/22/2025    CREATININE 1.33 (H) 03/22/2025    EGFRIFNONA 58 (L) 02/19/2022    EGFRIFAFRI 67 02/19/2022    BCR 11 03/22/2025    K 4.4 03/22/2025    CO2 26 03/22/2025    CALCIUM 9.3 03/22/2025    ALBUMIN 4.2 03/22/2025    AST 16 03/22/2025    ALT 16 03/22/2025     No  "results found for: \"LAURA\", \"RF\", \"SEDRATE\"   No results found for: \"CRP\"   No results found for: \"IRON\", \"TIBC\", \"FERRITIN\"   No results found for: \"CVXQKBQJ36\"     Age-appropriate Screening Schedule:  Refer to the list below for future screening recommendations based on patient's age, sex and/or medical conditions. Orders for these recommended tests are listed in the plan section. The patient has been provided with a written plan.    Health Maintenance   Topic Date Due    TDAP/TD VACCINES (1 - Tdap) Never done    Pneumococcal Vaccine 50+ (1 of 1 - PCV) Never done    ZOSTER VACCINE (1 of 2) Never done    COVID-19 Vaccine (5 - 2024-25 season) 09/01/2024    INFLUENZA VACCINE  07/01/2025    COLORECTAL CANCER SCREENING  11/27/2025    LIPID PANEL  03/22/2026    ANNUAL PHYSICAL  03/25/2026    HEPATITIS C SCREENING  Discontinued           Assessment & Plan      Medications        Problem List         LOS  Physical.  Doing well, vaccines current.    Recommend update tetanus at [facility].  Discussed health maintenance, screening test, lifestyle modification.  Hypertension.Good control.Positive family history CAD in his mother.Recommend cardiac stress testing,carotid Dopplers he declined secondary to cost,recommend CT chest coronary artery screening/vascular bundle he states he will consider.Recommend yearly ophthalmology eval.  Hyperlipidemia.  Improved today on rosuvastatin, LDL to 82...  Discussed diet, exercise, lifestyle modification.  Follow-up recheck.  Prostate screening.  PSA normal.  Colon screening.  Cologuard normal 2022.  Renal insufficiency.  Mild, persistent.  Increase fluid intake, decrease caffeine.  Avoid NSAIDs.  Follow-up recheck.  Chronic hypertension likely contributing.  Family history ruptured cerebral aneurysm.  MRA brain benign 2016, 2022.  Avoid NSAIDs/aspirin.    Recheck MRA in 5 years.      Previous Exams:  PSA was 0.9 on 3/22/2025.    Previous PSA was 1.0  on 3/2/2024.  ColoGuard was on 11/27/2022 " ordered by Dr Alvarez.     Impression of Negative    Recommended repeat in 3 years        Diagnoses and all orders for this visit:    1. Annual visit for general adult medical examination with abnormal findings (Primary)    2. Essential hypertension  -     CBC & Differential; Future  -     Lipid Panel With / Chol / HDL Ratio; Future  -     Comprehensive Metabolic Panel; Future  -     TSH; Future    3. Mixed hyperlipidemia  -     CBC & Differential; Future  -     Lipid Panel With / Chol / HDL Ratio; Future  -     Comprehensive Metabolic Panel; Future  -     TSH; Future    4. Elevated fasting blood sugar  -     Comprehensive Metabolic Panel; Future    5. Class 2 severe obesity due to excess calories with serious comorbidity and body mass index (BMI) of 36.0 to 36.9 in adult    6. Screening for malignant neoplasm of prostate  -     PSA Screen; Future    7. Screening for malignant neoplasm of colon  -     Cologuard - Stool, Per Rectum; Future              Expected course, medications, and adverse effects discussed.  Call or return if worsening or persistent symptoms.  I wore protective equipment throughout this patient encounter including a mask, gloves, and eye protection.  Hand hygiene was performed before donning protective equipment and after removal when leaving the room. The complete contents of the Assessment and Plan and Data / Lab Results as documented above have been reviewed and addressed by myself with the patient today as part of an ongoing evaluation / treatment plan.  If some of the documentation has been copied from a previous note and is unchanged it indicates that this problem / plan has been assessed today but is stable from a previous visit and no changes have been recommended.  The separate E/M service provided today is significant, medically necessary, and separately identifiable.